# Patient Record
Sex: MALE | Race: WHITE
[De-identification: names, ages, dates, MRNs, and addresses within clinical notes are randomized per-mention and may not be internally consistent; named-entity substitution may affect disease eponyms.]

---

## 2018-04-13 NOTE — ER
Nurse's Notes                                                                                     

 [facility]                                                                

Name: Epifanio Quinones                                                                            

Age: 62 yrs                                                                                       

Sex: Male                                                                                         

: 1955                                                                                   

MRN: [id number]                                                                                   

Arrival Date: 2018                                                                          

Time: 21:39                                                                                       

Account#: [id number]                                                                            

Bed 4                                                                                             

Private MD:                                                                                       

Diagnosis: Chronic obstructive pulmonary disease with (acute) exacerbation                        

                                                                                                  

Presentation:                                                                                     

                                                                                             

21:33 Presenting complaint: EMS states: they were toned out for report of pt with respiratory bb  

      distress pt's room sats were 84% on their arrival they put pt on C-pap and administered     

      solu-medrol 125 mg and neb txs A\T\A x 2. Transition of care: patient was not received      

      from another setting of care. Onset of symptoms was 2018. Care prior to           

      arrival: Medication(s) given: Albuterol Neb x 2, Atrovent Neb x 2, solu-medrol 125 mg       

      IV initiated. 20 GA, in the left wrist.                                                     

21:33 Method Of Arrival: EMS: [facility]                                                    bb  

21:33 Acuity: AMBER 2                                                                           bb  

                                                                                                  

Historical:                                                                                       

- Allergies:                                                                                      

21:43 No Known Allergies;                                                                     bb  

- Home Meds:                                                                                      

21:43 Combivent  mcg/actuation Inhl aero [Active];                                      bb  

- PMHx:                                                                                           

21:43 Hypertension; COPD;                                                                     bb  

                                                                                                  

- Immunization history:: Adult Immunizations unknown.                                             

- Social history:: Smoking status: Patient uses tobacco products, smokes one pack                 

  cigarettes per day.                                                                             

                                                                                                  

                                                                                                  

Screenin:25 Abuse screen: Denies threats or abuse. Denies injuries from another. Nutritional        lp1 

      screening: No deficits noted. Tuberculosis screening: No symptoms or risk factors           

      identified. Fall Risk Total Mcleod Fall Scale indicates High Risk Score (45 or more          

      points). Fall prevention measures have been instituted. Side Rails Up X 2 As available      

      patient and family educated on Fall Prevention Program and Strategies.                      

                                                                                                  

Assessment:                                                                                       

21:54 Reassessment: pt states he is supposed to take cholesterol and blood pressure           bb  

      medication but he does not take them.                                                       

22:00 General: Appears distressed, Behavior is calm, cooperative. Pain: Denies pain. Neuro:   lp1 

      Level of Consciousness is awake, alert, obeys commands, Oriented to person, place,          

      time, situation. Cardiovascular: Capillary refill is > 3 seconds is sluggish Patient's      

      skin is warm and dry. Rhythm is sinus tachycardia. Respiratory: Airway is patent            

      Trachea midline Respiratory effort is even, labored, Respiratory pattern is tachypnea       

      Breath sounds with wheezes bilaterally. GI: Abdomen is flat. : No signs and/or            

      symptoms were reported regarding the genitourinary system. EENT: No signs and/or            

      symptoms were reported regarding the EENT system. Derm: Skin is fragile, is thin, with      

      poor turgor Skin is dry, Skin is pale. Musculoskeletal: Circulation, motion, and            

      sensation intact.                                                                           

23:00 Reassessment: Patient and/or family updated on plan of care and expected duration. Pain lp1 

      level reassessed. Patient continuing with BiPAP; Patient states symptoms have improved.     

                                                                                                  

Vital Signs:                                                                                      

21:43  / 131; Pulse 127; Resp 30 S; Temp 97.6(A); Pulse Ox 97% on 35% BiPAP; Weight     bb  

      65.77 kg (R); Height 5 ft. 8 in. (172.72 cm) (R);                                           

22:30  / 119; Pulse 115; Resp 23; Pulse Ox 99% on 35% BiPAP;                            lp1 

23:00  / 100; Pulse 108; Resp 23; Pulse Ox 98% on 35% BiPAP;                            lp1 

23:20  / 105; Pulse 117; Resp 25; Pulse Ox 98% on 35% BiPAP;                            lp1 

23:40  / 92; Pulse 114; Resp 25; Pulse Ox 98% on 35% BiPAP;                             lp1 

21:43 Body Mass Index 22.05 (65.77 kg, 172.72 cm)                                             bb  

                                                                                                  

ED Course:                                                                                        

21:39 Patient arrived in ED.                                                                  [id number] 

21:40 Yusuf Mckenzie PA is PHCP.                                                               [email or web address] 

21:40 Obinna Long MD is Attending Physician.                                           [email or web address] 

21:43 Triage completed.                                                                       bb  

21:43 Arm band placed on Patient placed in an exam room, on a stretcher, on oxygen, on        bb  

      cardiac monitor, on pulse oximetry, RT at bedside for placement of Bipap. Settings:         

      12/6, 12, 35%.                                                                              

22:00 Patient has correct armband on for positive identification. Bed in low position. Call   lp1 

      light in reach. Cardiac monitor on. Pulse ox on. NIBP on.                                   

22:00 Inserted saline lock: 22 gauge in right antecubital area, using aseptic technique.      lp1 

22:00 Maintain EMS IV. Dressing intact. Site clean \T\ dry. Gauge \T\ site: 20g Left Wrist.       lp
1

22:08 Solange Holden, RN is Primary Nurse.                                                       lp1 

22:10 Inserted saline lock: 20 gauge in left forearm, using aseptic technique. Blood          bb  

      collected.                                                                                  

22:24 X-ray completed. Portable x-ray completed in exam room. Patient tolerated procedure     1 

      well.                                                                                       

22:25 XRAY Chest (1 view) In Process Unspecified.                                             EDMS

23:07 Moise Boyd MD is Hospitalizing Provider.                                          [email or web address] 

23:26 No provider procedures requiring assistance completed. Patient admitted, IV remains in  lp1 

      place. 20g IV to left forearm DC'd due to infiltration.                                     

                                                                                                  

Administered Medications:                                                                         

22:09 Drug: Albuterol 2.5 mg Route: Inhalation;                                               lp1 

22:30 Drug: Albuterol 2.5 mg Route: Inhalation;                                               lp1 

23:01 Drug: Albuterol 2.5 mg Route: Inhalation;                                               lp1 

23:15 Drug: NS 0.9% 1000 ml Route: IV; Rate: 1000 ml; Site: left wrist;                       lp1 

                                                                                             

00:16 Follow up: IV Status: Infusion continued upon admission                                 lp1 

                                                                                             

23:15 Drug: hydrALAZINE 10 mg Route: IV; Rate: calculated rate; Site: left wrist;             lp1 

                                                                                             

00:15 Follow up: Response: Blood pressure is lowered; IV Status: Completed infusion           lp1 

                                                                                                  

                                                                                                  

Outcome:                                                                                          

                                                                                             

23:07 Decision to Hospitalize by Provider.                                                    [email or web address] 

23:27 Condition: stable                                                                       lp1 

23:27 Instructed on the need for admit.                                                           

                                                                                             

00:22 Admitted to Tele accompanied by nurse, via stretcher, room 422, with oxygen, with       1 

      chart, Report called to  STEPHANIE Cesar                                                          

00:30 Patient left the ED.                                                                    1 

                                                                                                  

Signatures:                                                                                       

Dispatcher [employer]                           EDMS                                                 

Ruba Baxter                               [facility]                                                  

Argentina Wood RN RN bb Pena, Laura, STEPHANIE                         RN   1                                                  

Yusuf Mckenzie PA PA   jr8                                                  

                                                                                                  

Corrections: (The following items were deleted from the chart)                                    

00:50 00:49 Patient left the ED. 1                                                          1 

                                                                                                  

**************************************************************************************************

## 2018-04-13 NOTE — EDPHYS
Physician Documentation                                                                           

 [facility]                                                                

Name: Epifanio Quinones                                                                            

Age: 62 yrs                                                                                       

Sex: Male                                                                                         

: 1955                                                                                   

MRN: [id number]                                                                                   

Arrival Date: 2018                                                                          

Time: 21:39                                                                                       

Account#: [id number]                                                                            

Bed 4                                                                                             

Private MD:                                                                                       

ED Physician Obinna Long                                                                    

HPI:                                                                                              

                                                                                             

22:01 This 62 yrs old  Male presents to ER via EMS with complaints of shortness of   jr8 

      breath.                                                                                     

22:01 The patient has shortness of breath at rest. Onset: The symptoms/episode began/occurred jr8 

      gradually, 2 day(s) ago. Duration: The symptoms are continuous, and are steadily            

      getting worse. The patient's shortness of breath is aggravated by walking. Associated       

      signs and symptoms: The patient has no apparent associated signs or symptoms. Severity      

      of symptoms: At their worst the symptoms were moderate in the emergency department the      

      symptoms are unchanged. It is unknown whether or not the patient has had similar            

      symptoms in the past. The patient has not recently seen a physician. Patient stated         

      that he has had worsening of shortness of breath over the last few days. Stated that it     

      was so bad tonight he called EMS. EMS stated that he was 84% on RA upon arrival. ROBERTO        

      with steroids given. CPAP applied after no relief. Patient stated that he feels better      

      on CPAP .                                                                                   

                                                                                                  

Historical:                                                                                       

- Allergies:                                                                                      

21:43 No Known Allergies;                                                                     bb  

- Home Meds:                                                                                      

21:43 Combivent  mcg/actuation Inhl aero [Active];                                      bb  

- PMHx:                                                                                           

21:43 Hypertension; COPD;                                                                     bb  

                                                                                                  

- Immunization history:: Adult Immunizations unknown.                                             

- Social history:: Smoking status: Patient uses tobacco products, smokes one pack                 

  cigarettes per day.                                                                             

                                                                                                  

                                                                                                  

ROS:                                                                                              

22:01 Eyes: Negative for injury, pain, redness, and discharge, ENT: Negative for injury,      jr8 

      pain, and discharge, Neck: Negative for injury, pain, and swelling, Cardiovascular:         

      Negative for chest pain, palpitations, and edema, Abdomen/GI: Negative for abdominal        

      pain, nausea, vomiting, diarrhea, and constipation, Back: Negative for injury and pain,     

      MS/Extremity: Negative for injury and deformity, Skin: Negative for injury, rash, and       

      discoloration, Neuro: Negative for headache, weakness, numbness, tingling, and seizure.     

22:01 Respiratory: Positive for cough, dyspnea on exertion, shortness of breath, wheezing.        

                                                                                                  

Exam:                                                                                             

22:01 Eyes:  Pupils equal round and reactive to light, extra-ocular motions intact.  Lids and jr8 

      lashes normal.  Conjunctiva and sclera are non-icteric and not injected.  Cornea within     

      normal limits.  Periorbital areas with no swelling, redness, or edema. ENT:  Nares          

      patent. No nasal discharge, no septal abnormalities noted.  Tympanic membranes are          

      normal and external auditory canals are clear.  Oropharynx with no redness, swelling,       

      or masses, exudates, or evidence of obstruction, uvula midline.  Mucous membranes           

      moist. Neck:  Trachea midline, no thyromegaly or masses palpated, and no cervical           

      lymphadenopathy.  Supple, full range of motion without nuchal rigidity, or vertebral        

      point tenderness.  No Meningismus. Abdomen/GI:  Soft, non-tender, with normal bowel         

      sounds.  No distension or tympany.  No guarding or rebound.  No evidence of tenderness      

      throughout. Back:  No spinal tenderness.  No costovertebral tenderness.  Full range of      

      motion. Skin:  Warm, dry with normal turgor.  Normal color with no rashes, no lesions,      

      and no evidence of cellulitis. MS/ Extremity:  Pulses equal, no cyanosis.                   

      Neurovascular intact.  Full, normal range of motion. Neuro:  Awake and alert, GCS 15,       

      oriented to person, place, time, and situation.  Cranial nerves II-XII grossly intact.      

      Motor strength 5/5 in all extremities.  Sensory grossly intact.  Cerebellar exam            

      normal.  Normal gait.                                                                       

22:01 Cardiovascular: Rate: tachycardic, Rhythm: regular, Pulses: Pulses are 2+ in bilateral      

      radial, brachial, femoral, popliteal, posterior tibial and and dorsalis pedis               

      arteries.. Heart sounds: normal, normal S1and S2, no S3 or S4, no murmur, no rub, no        

      gallop, Edema: is not appreciated, JVD: is not appreciated.                                 

22:01 Respiratory: moderate respiratory distress is noted,  Respirations: labored breathing,      

      tachypnea, Breath sounds: rales, that are mild, are located in both bases, wheezing:        

      expiratory that is moderate, is heard diffusely.                                            

                                                                                                  

Vital Signs:                                                                                      

21:43  / 131; Pulse 127; Resp 30 S; Temp 97.6(A); Pulse Ox 97% on 35% BiPAP; Weight     bb  

      65.77 kg (R); Height 5 ft. 8 in. (172.72 cm) (R);                                           

22:30  / 119; Pulse 115; Resp 23; Pulse Ox 99% on 35% BiPAP;                            lp1 

23:00  / 100; Pulse 108; Resp 23; Pulse Ox 98% on 35% BiPAP;                            lp1 

23:20  / 105; Pulse 117; Resp 25; Pulse Ox 98% on 35% BiPAP;                            lp1 

23:40  / 92; Pulse 114; Resp 25; Pulse Ox 98% on 35% BiPAP;                             lp1 

21:43 Body Mass Index 22.05 (65.77 kg, 172.72 cm)                                             bb  

                                                                                                  

MDM:                                                                                              

21:40 Patient medically screened.                                                             jr8 

23:06 Data reviewed: vital signs, nurses notes, lab test result(s), EKG, radiologic studies,  jr8 

      plain films, and as a result, I will admit patient. Data interpreted: Pulse oximetry:       

      on room air is 84 %. Interpretation: hypoxia. Counseling: I had a detailed discussion       

      with the patient and/or guardian regarding: the historical points, exam findings, and       

      any diagnostic results supporting the discharge/admit diagnosis, lab results, radiology     

      results, the need for further work-up and treatment in the hospital. Response to            

      treatment: the patient's symptoms have mildly improved after treatment. Physician           

      consultation: Moise Boyd MD was called at 23:07, was contacted at 23:07, regarding      

      admission, to the telemetry unit. and will see patient in ED.                               

                                                                                                  

                                                                                             

21:40 Order name: Basic Metabolic Panel; Complete Time: 22:50                                                                                                                              

21:40 Order name: BNP; Complete Time: 22:50                                                                                                                                                

21:40 Order name: CBC with Diff; Complete Time: 22:50                                                                                                                                      

21:40 Order name: LFT's; Complete Time: 22:50                                                 [facility] 

                                                                                             

21:40 Order name: Magnesium; Complete Time: 22:50                                                                                                                                          

21:40 Order name: PT-INR; Complete Time: 22:50                                                                                                                                             

21:40 Order name: Ptt, Activated; Complete Time: 22:50                                        [facility] 

                                                                                             

21:40 Order name: Troponin (emerg Dept Use Only); Complete Time: 22:50                        [facility] 

                                                                                             

21:40 Order name: XRAY Chest (1 view)                                                                                                                                                      

21:40 Order name: Blood Culture Adult (2)                                                     [facility] 

                                                                                             

22:29 Order name: ABG; Complete Time: 22:55                                                   [facility] 

                                                                                             

22:35 Order name: BIPAP                                                                       [facility] 

                                                                                             

21:40 Order name: EKG; Complete Time: 21:41                                                   [facility] 

                                                                                             

21:40 Order name: Cardiac monitoring; Complete Time: 21:52                                    [facility] 

                                                                                             

21:40 Order name: EKG - Nurse/Tech; Complete Time: 21:52                                      [facility] 

                                                                                             

21:40 Order name: IV Saline Lock; Complete Time: 22:09                                        [facility] 

                                                                                             

21:40 Order name: Labs collected and sent; Complete Time: 23:16                               [facility] 

                                                                                             

21:40 Order name: O2 Per Protocol; Complete Time: 22:09                                       [facility] 

                                                                                             

21:40 Order name: O2 Sat Monitoring; Complete Time: 21:53                                     [facility] 

                                                                                                  

Administered Medications:                                                                         

22:09 Drug: Albuterol 2.5 mg Route: Inhalation;                                               1 

22:30 Drug: Albuterol 2.5 mg Route: Inhalation;                                               1 

23:01 Drug: Albuterol 2.5 mg Route: Inhalation;                                               [facility] 

23:15 Drug: NS 0.9% 1000 ml Route: IV; Rate: 1000 ml; Site: left wrist;                       [facility] 

                                                                                             

00:16 Follow up: IV Status: Infusion continued upon admission                                 [facility] 

                                                                                             

23:15 Drug: hydrALAZINE 10 mg Route: IV; Rate: calculated rate; Site: left wrist;             [facility] 

                                                                                             

00:15 Follow up: Response: Blood pressure is lowered; IV Status: Completed infusion           1 

                                                                                                  

                                                                                                  

Disposition:                                                                                      

05:41 Co-signature as Attending Physician, Obinna Long MD.                               ma2 

                                                                                                  

Disposition:                                                                                      

18 23:07 Hospitalization ordered by Moise Boyd for Inpatient Admission. Preliminary    

  diagnosis is Chronic obstructive pulmonary disease with (acute) exacerbation.                   

- Bed requested for Telemetry/MedSurg (Inpatient).                                                

- Status is Inpatient Admission.                                                              lp1 

- Condition is Stable.                                                                            

- Problem is new.                                                                                 

- Symptoms have improved.                                                                         

UTI on Admission? No                                                                              

                                                                                                  

                                                                                                  

                                                                                                  

Signatures:                                                                                       

Dispatcher [employer]                           EDMS                                                 

Argentina Wood RN RN bb Pena, Laura, RN                         RN   lp1                                                  

Yusuf Mckenzie PA PA   jr8                                                  

Min, Starla, RN                       RN   cg                                                   

Obinna Long MD MD   ma2                                                  

                                                                                                  

**************************************************************************************************

## 2018-04-13 NOTE — P.HP
Certification for Inpatient


Patient admitted to: Inpatient


With expected LOS: >2 Midnights


Practitioner: I am a practitioner with admitting privileges, knowledge of 

patient current condition, hospital course, and medical plan of care.


Services: Services provided to patient in accordance with Admission 

requirements found in Title 42 Section 412.3 of the Code of Federal Regulations





Patient History


Date of Service: 04/13/18


Reason for admission: COPD exacerbation


History of Present Illness: 





Mr Quinones is a 62 years old male with history of COPD, HTN, dyslipidemia, who 

start about 3 weeks ago with increasing cough, unable to bring up any 

secretions. He has been feeling worse in the last couple of days, more SOB and 

cough. He denied fever or chills. No chest pain either. He called 911 today 

since he had severe SOB. EMS found the patient on respiratory distress, O2 Sat 

84% on RA. The patient states that has not been treated with Advair for the 

last month due to refill problems. In ER the patient was placed on BiPAP, had 

several breathing treatments rounds, IV steroids, and slowly has been 

improving. At presentation he was afebrile, tachycardic, tachypneic, WBC 11.9K, 

CXR shows no acute infiltrate, awaiting final report by radiologist.


Allergies





No Known Drug Allergies Allergy (Unverified 07/09/15 14:11)


 Unknown








- Past Medical/Surgical History


-: COPD


-: tobacco abuse


-: HTN


-: dyslipidemia


Past Surgical History: Reviewed- Non-Contributory





- Family History


Family History: Reviewed- Non-Contributory





- Social History


Smoking Status: Heavy Tobacco smoker (>10 cigarettes/day)


Counseled patient to stop smoking for: less than 10 minutes


Smoking therapy provided: Yes


Patient receptive to therapy: Yes


CD- Drugs: No


Place of Residence: Home





Review of Systems


10-point ROS is otherwise unremarkable





Physical Examination





- Physical Exam


General: Alert, In no apparent distress


HEENT: Atraumatic, PERRLA, Mucous membr. moist/pink, EOMI, Sclerae nonicteric


Neck: Supple, 2+ carotid pulse no bruit, No LAD, Without JVD or thyroid 

abnormality


Respiratory: Diminished, Expiratory wheezes (bilateral)


Cardiovascular: Regular rate/rhythm, Normal S1 S2


Gastrointestinal: Normal bowel sounds, No tenderness


Musculoskeletal: No tenderness


Integumentary: No rashes


Neurological: Normal speech, Normal strength at 5/5 x4 extr, Normal tone, 

Normal affect


Lymphatics: No axilla or inguinal lymphadenopathy





- Studies


Laboratory Data (last 24 hrs)





04/13/18 22:15: PT 14.1 H, INR 1.19, APTT 29.4


04/13/18 22:15: WBC 11.9 H, Hgb 17.1, Hct 51.4 H, Plt Count 187


04/13/18 22:15: B-Natriuretic Peptide 35


04/13/18 22:15: Sodium 137, Potassium 4.1, BUN 18, Creatinine 1.07, Glucose 96, 

Magnesium 2.0, Total Bilirubin 0.9, AST 32, ALT 31, Alkaline Phosphatase 91








Assessment and Plan





- Problems (Diagnosis)


(1) COPD exacerbation


Current Visit: Yes   Status: Acute   





(2) HTN (hypertension)


Current Visit: Yes   Status: Acute   


Qualifiers: 


   Hypertension type: essential hypertension   Qualified Code(s): I10 - 

Essential (primary) hypertension   





(3) Tobacco abuse


Current Visit: Yes   Status: Acute   





- Plan





The patient will be admitted to the hospital due to COPD exacerbation due to 

acute bronchitis. CXR shows no acute infiltrate, WBC elevated. Will give 

empiric antibiotic, IV steroids and scheduled breathing treatment. Consult Dr Massey.





- Advance Directives


Does patient have a Living Will: No


Does patient have a Durable POA for Healthcare: No





- Code Status/Comfort Care


Code Status Assessed: Yes


Code Status: Full Code

## 2018-04-14 NOTE — PN
Date of Progress Note:  04/14/2018



Subjective:  The patient seen and examined, chart reviewed, and case discussed with RN.  The patient 
states, he is very much short of breath.  States, he ran out of his Advair.



Review of Systems:

Negative except as above.



Medications:  Reviewed.



Physical Examination:

Vital Signs:  Temperature 99.5, heart rate 117, respirations 20, blood pressure 130/86, and O2 95% on
 3 L via nasal cannula. 

General:  Awake, alert, oriented x3, in some mild respiratory distress, appears older than stated age
.  Ill-appearing male. 

CV:  S1, S2.  Sinus tachycardia.  No murmurs.  Peripheral pulses weak bilaterally. 

Respiratory:  diminished breath sounds.  Wheezing is present throughout.  The patient is tachypneic. 


Gastrointestinal:  Abdomen is soft, nontender, nondistended.  Positive bowel sounds.  No guarding or 
rigidity. 

Extremities:  No clubbing, cyanosis, edema. 

Neurologic:  Nonfocal.



Laboratory Data:  Sodium 138, potassium 4.8, chloride 105, CO2 25, BUN 20, creatinine 1, glucose 124,
 and calcium 9.4.  WBC 5, H and H 16.8, 49.6, platelets 177, and neutrophils 85%.  Blood culture pend
ing.



Assessment:  A 62-year-old male with;

1.Acute chronic obstructive pulmonary disease exacerbation.  We will continue with breathing treatme
nts and IV steroids.  Chest x-ray did not show any acute infiltrate.  We will continue to monitor.  W
e will get respiratory therapy to evaluate for possible home oxygen need.

2.Essential hypertension.  We will resume home medications as appropriate.

3.Nicotine dependence with cigarette smoking.  Counseled.

4.Dyslipidemia.  Resume home medications as appropriate.



Plan:  Followup with pulmonology evaluation.  Continue prophylactic antibiotics.





SA/MODL

DD:  04/14/2018 12:35:33Voice ID:  [id number]

DT:  04/14/2018 13:57:42Report ID:  [id number]

## 2018-04-14 NOTE — RAD REPORT
EXAM DESCRIPTION:  RAD - Chest Single View - 4/13/2018 10:26 pm

 

CLINICAL HISTORY:  Dyspnea

 

COMPARISON:  None.

 

TECHNIQUE:  AP portable chest image was obtained 2215 hours .

 

FINDINGS:  Lungs are hyperexpanded with flattened diaphragm and costophrenic angle blunting. Fibrotic
 lung pattern is present. This COPD pattern has no comparison to assess for progression. Small granul
omas are noted. No consolidation or mass. Minimal interstitial edema or infiltrate could be masked in
 this setting. No significant infiltrative process currently seen. Failure and volume overload are no
t suspected. Heart and vasculature are normal. No measurable pleural effusion and no pneumothorax. No
 gross bony abnormality seen. No acute aortic findings suspected.

 

IMPRESSION:  Prominent COPD pattern without focal infiltrate, mass or consolidation.

 

Chronic interstitial lung disease can mask early stages of edema or infiltrate.

## 2018-04-15 NOTE — EKG
Test Date:    2018-04-13               Test Time:    21:47:16

Technician:   ESTHER                                     

                                                     

MEASUREMENT RESULTS:                                       

Intervals:                                           

Rate:         116                                    

SC:           122                                    

QRSD:         76                                     

QT:           320                                    

QTc:          444                                    

Axis:                                                

P:            86                                     

SC:           122                                    

QRS:          79                                     

T:            73                                     

                                                     

INTERPRETIVE STATEMENTS:                                       

                                                     

Sinus tachycardia

Biatrial enlargement

Abnormal ECG

No previous ECG available for comparison



Electronically Signed On 04-15-18 22:38:25 CDT by Gabriel Jang

## 2018-04-15 NOTE — P.DS
Admission Date: 04/13/18


Discharge Date: 04/15/18


Disposition: ROUTINE DISCHARGE


Discharge Condition: FAIR


Reason for Admission: COPD exacerbation


Brief History of Present Illness: 





Patient is 62 years of age admitted with COPD exacerbation


Hospital Course: 





Patient did well low no complaints he was hypoxic hypercapnic apparently he ran 

out of his inhaler the time of discharge he was doing well alert oriented 

responsive cooperative in no significant distress room-air sats were fine he 

did have mild hypoxemia on exertion patient is to resume his Advair prescribed 

him some prednisone chest x-ray shows hyperinflation he continues to smoke I 

counseled him for 3 min not to smoke and nicotine replacement therapy


The time of discharge vital signs all stable chest examination renal shows 

bilateral rhonchi diminished air entry cardiovascular smiles was normal abdomen 

soft extremities no edema he was ambulating prior to discharge


Vital Signs/Physical Exam: 














Temp Pulse Resp BP Pulse Ox


 


 98.4 F   96 H  18   152/86 H  93 


 


 04/15/18 08:00  04/15/18 08:00  04/15/18 08:00  04/15/18 08:00  04/15/18 08:00








Laboratory Data at Discharge: 














WBC  16.1 K/uL (4.3-10.9)  H D 04/15/18  04:17    


 


Hgb  15.2 g/dL (13.6-17.9)   04/15/18  04:17    


 


Hct  46.3 % (39.6-49.0)   04/15/18  04:17    


 


Plt Count  187 K/uL (152-406)   04/15/18  04:17    


 


PT  14.1 SECONDS (9.5-12.5)  H  04/13/18  22:15    


 


INR  1.19   04/13/18  22:15    


 


APTT  29.4 SECONDS (24.3-36.9)   04/13/18  22:15    


 


Sodium  137 mEq/L (135-145)   04/15/18  04:17    


 


Potassium  4.7 mEq/L (3.6-5.0)   04/15/18  04:17    


 


BUN  20 mg/dL (6-20)   04/15/18  04:17    


 


Creatinine  0.96 mg/dL (0.61-1.24)   04/15/18  04:17    


 


Glucose  116 mg/dL ()   04/15/18  04:17    


 


Magnesium  2.0 mg/dL (1.8-2.5)   04/13/18  22:15    


 


Total Bilirubin  0.9 mg/dL (0.3-1.2)   04/13/18  22:15    


 


AST  32 IU/L (10-42)   04/13/18  22:15    


 


ALT  31 IU/L (10-60)   04/13/18  22:15    


 


Alkaline Phosphatase  91 IU/L ()   04/13/18  22:15    


 


B-Natriuretic Peptide  35 pg/ml (<=100)   04/13/18  22:15    








Home Medications: 








Ipratropium/Albuterol Sulfate [Combivent Respimat Inhal Spray] 1 puff IH BID 04/ 14/18 


Fluticasone/Salmeterol [Advair 250-50 Diskus] 1 each IH BID #1 blst.w.dev 04/15/

18 


Prednisone [Deltasone] 20 mg PO BID #14 tablet 04/15/18 





New Medications: 


Fluticasone/Salmeterol [Advair 250-50 Diskus] 1 each IH BID #1 blst.w.dev


Prednisone [Deltasone] 20 mg PO BID #14 tablet


Patient Discharge Instructions: Patient to follow with me in 2 weeks discharge 

room-air sat at rest greater than 88%


Diet: Regular


Activity: Ad gallito

## 2022-08-07 ENCOUNTER — HOSPITAL ENCOUNTER (EMERGENCY)
Dept: HOSPITAL 97 - ER | Age: 67
Discharge: HOME | End: 2022-08-07
Payer: MEDICARE

## 2022-08-07 VITALS — OXYGEN SATURATION: 99 %

## 2022-08-07 VITALS — SYSTOLIC BLOOD PRESSURE: 182 MMHG | DIASTOLIC BLOOD PRESSURE: 84 MMHG

## 2022-08-07 VITALS — TEMPERATURE: 97.9 F

## 2022-08-07 DIAGNOSIS — Z20.822: ICD-10-CM

## 2022-08-07 DIAGNOSIS — J44.1: Primary | ICD-10-CM

## 2022-08-07 DIAGNOSIS — F17.210: ICD-10-CM

## 2022-08-07 DIAGNOSIS — I10: ICD-10-CM

## 2022-08-07 LAB
ALBUMIN SERPL BCP-MCNC: 4 G/DL (ref 3.4–5)
ALP SERPL-CCNC: 103 U/L (ref 45–117)
ALT SERPL W P-5'-P-CCNC: 32 U/L (ref 12–78)
AST SERPL W P-5'-P-CCNC: 24 U/L (ref 15–37)
BUN BLD-MCNC: 9 MG/DL (ref 7–18)
GLUCOSE SERPLBLD-MCNC: 98 MG/DL (ref 74–106)
HCT VFR BLD CALC: 45.6 % (ref 39.6–49)
LYMPHOCYTES # SPEC AUTO: 1.8 K/UL (ref 0.7–4.9)
MCV RBC: 88 FL (ref 80–100)
PMV BLD: 7.6 FL (ref 7.6–11.3)
POTASSIUM SERPL-SCNC: 3.9 MMOL/L (ref 3.5–5.1)
RBC # BLD: 5.18 M/UL (ref 4.33–5.43)
TROPONIN I SERPL HS-MCNC: 5.9 PG/ML (ref ?–58.9)

## 2022-08-07 PROCEDURE — 05HQ33Z INSERTION OF INFUSION DEVICE INTO LEFT EXTERNAL JUGULAR VEIN, PERCUTANEOUS APPROACH: ICD-10-PCS

## 2022-08-07 PROCEDURE — 36415 COLL VENOUS BLD VENIPUNCTURE: CPT

## 2022-08-07 PROCEDURE — 71045 X-RAY EXAM CHEST 1 VIEW: CPT

## 2022-08-07 PROCEDURE — 36569 INSJ PICC 5 YR+ W/O IMAGING: CPT

## 2022-08-07 PROCEDURE — 99285 EMERGENCY DEPT VISIT HI MDM: CPT

## 2022-08-07 PROCEDURE — 96374 THER/PROPH/DIAG INJ IV PUSH: CPT

## 2022-08-07 PROCEDURE — 84484 ASSAY OF TROPONIN QUANT: CPT

## 2022-08-07 PROCEDURE — 85025 COMPLETE CBC W/AUTO DIFF WBC: CPT

## 2022-08-07 PROCEDURE — 80053 COMPREHEN METABOLIC PANEL: CPT

## 2022-08-07 PROCEDURE — 94640 AIRWAY INHALATION TREATMENT: CPT

## 2022-08-07 PROCEDURE — 96372 THER/PROPH/DIAG INJ SC/IM: CPT

## 2022-08-07 NOTE — ER
Nurse's Notes                                                                                     

 [facility]                                                                 

Name: Epifanio Quinones                                                                            

Age: 67 yrs                                                                                       

Sex: Male                                                                                         

: 1955                                                                                   

MRN: [id number]                                                                                   

Arrival Date: 2022                                                                          

Time: 15:49                                                                                       

Account#: [id number]                                                                            

Bed 3                                                                                             

Private MD:                                                                                       

Diagnosis: COPD/ Chronic obstructive pulmonary disease with (acute) exacerbation                  

                                                                                                  

Presentation:                                                                                     

                                                                                             

16:06 Chief complaint: Patient states: hx of COPD and emphysema, has had SOB for two weeks,   iw  

      electricity went out and was unable to do his breathing treatments, I have eczema real      

      bad also. Coronavirus screen: Client presents with at least one sign or symptom that        

      may indicate coronavirus-19. Ebola Screen: Patient negative for fever greater than or       

      equal to 101.5 degrees Fahrenheit, and additional compatible Ebola Virus Disease            

      symptoms Patient denies exposure to infectious person. Patient denies travel to an          

      Ebola-affected area in the 21 days before illness onset. No symptoms or risks               

      identified at this time. Initial Sepsis Screen: Does the patient meet any 2 criteria?       

      No. Patient's initial sepsis screen is negative. Does the patient have a suspected          

      source of infection? No. Patient's initial sepsis screen is negative. Risk Assessment:      

      Do you want to hurt yourself or someone else? Patient reports no desire to harm self or     

      others. Onset of symptoms was 2022.                                                

16:06 Method Of Arrival: Wheelchair                                                           iw  

16:06 Acuity: AMBER 3                                                                           iw  

                                                                                                  

Triage Assessment:                                                                                

17:24 General: Appears in no apparent distress. comfortable, unkempt, Behavior is calm,       bm7 

      cooperative. Pain: Denies pain. EENT: No deficits noted. No signs and/or symptoms were      

      reported regarding the EENT system. Neuro: No deficits noted. Cardiovascular: No            

      deficits noted. Respiratory: Reports shortness of breath at rest on exertion cough that     

      is non-productive, Breath sounds are coarse bilaterally. Onset: The symptoms/episode        

      began/occurred yesterday, the patient has moderate shortness of breath. GI: No deficits     

      noted. No signs and/or symptoms were reported involving the gastrointestinal system.        

      : No deficits noted. No signs and/or symptoms were reported regarding the                 

      genitourinary system. Derm: Skin is intact, is fragile, is thin, has lesions on pt has      

      multiple sores covering entire body Skin is normal, Skin temperature is warm.               

      Musculoskeletal: No deficits noted. No signs and/or symptoms reported regarding the         

      musculoskeletal system.                                                                     

                                                                                                  

Historical:                                                                                       

- Allergies:                                                                                      

16:09 No Known Allergies;                                                                     iw  

- Home Meds:                                                                                      

16:09 Combivent  mcg/actuation Inhl aero [Active];                                      iw  

- PMHx:                                                                                           

16:09 COPD; Hypertension;                                                                     iw  

                                                                                                  

- Immunization history:: Adult Immunizations up to date.                                          

- Social history:: Smoking status: Patient reports the use of cigarette tobacco                   

  products, smokes one-half pack cigarettes per day.                                              

                                                                                                  

                                                                                                  

Screenin:12 Abuse screen: Denies threats or abuse. Nutritional screening: No deficits noted.        bm7 

      Tuberculosis screening: No symptoms or risk factors identified. Fall Risk None              

      identified.                                                                                 

                                                                                                  

Assessment:                                                                                       

17:27 Reassessment: Patient and/or family updated on plan of care and expected duration. Pain bm7 

      level reassessed. Patient is alert, oriented x 3, equal unlabored respirations, skin        

      warm/dry/pink.                                                                              

18:05 Reassessment: Patient and/or family updated on plan of care and expected duration. Pain bm7 

      level reassessed. Patient is alert, oriented x 3, equal unlabored respirations, skin        

      warm/dry/pink. Patient states feeling better.                                               

18:27 Cardiovascular: Rhythm is regular.                                                      bm7 

18:28 Respiratory: Airway is patent Trachea midline Respiratory effort is even, labored.      bm7 

                                                                                                  

Vital Signs:                                                                                      

16:06 Pulse 91; Resp 24 S; Temp 97.9; Pulse Ox 95% on R/A;                                    iw  

17:24  / 80; Pulse 74; Resp 20; Pulse Ox 99% on 2 lpm NC; Pain 0/10;                    bm7 

18:27  / 84; Pulse 78; Resp 16; Pulse Ox 99% on 2 lpm NC; Pain 0/10;                    bm7 

                                                                                                  

ED Course:                                                                                        

15:49 Patient arrived in ED.                                                                  am2 

16:08 Triage completed.                                                                       iw  

16:09 Arm band placed on.                                                                     iw  

16:25 Shad Hillman is PHCP.                                                                  jl9 

16:25 Santiago Amaro MD is Attending Physician.                                             jl9 

17:09 Michaela Guallpa, RN is Primary Nurse.                                                bm7 

17:12 Patient has correct armband on for positive identification. Placed in gown.             bm7 

17:15 XRAY Chest (1 view) In Process Unspecified.                                             EDMS

17:27 Client placed on continuous cardiac and pulse oximetry monitoring. NIBP monitoring      bm7 

      applied. Cardiac monitor on. Warm blanket given.                                            

17:27 Initial lab(s) drawn, by me, sent to lab. COVID swab sent to lab. X-ray(s) taken.       bm7 

      Inserted saline lock: 18 gauge in left antecubital area, using aseptic technique. Blood     

      collected. Missed attempt(s): 22 gauge in left upper arm. Bleeding controlled, band aid     

      applied, catheter tip intact. Oxygen administration via nasal cannula \T\ 2L/min Response     

      to oxygen therapy: symptoms improved.                                                       

18:27 No provider procedures requiring assistance completed. intact, bleeding controlled, No  bm7 

      redness/swelling at site. Pressure dressing applied.                                        

                                                                                                  

Administered Medications:                                                                         

17:09 Drug: DuoNeb (albuterol 2.5 mg, ipratropium 0.5 mg) (3:1) (2.5 mg - 0.5 mg) 6 ml Route: bm7 

      Nebulizer;                                                                                  

17:40 Follow up: Response: No adverse reaction                                                bm7 

17:29 Drug: SOLU-Medrol (methylPREDNISolone sodium succinate) 125 mg Route: IM; Site: Other;  bm7 

17:41 Follow up: Response: No adverse reaction                                                bm7 

17:50 Not Given (medication not available ): Pulmicort 0.25 mg/2 mL 0.25 mg Inhalation once   bm7 

18:08 Drug: Benadryl (diphenhydrAMINE) 50 mg Route: IVP; Site: Other;                         bm7 

18:28 Follow up: Response: No adverse reaction                                                bm7 

                                                                                                  

                                                                                                  

Medication:                                                                                       

17:12 VIS not applicable for this client.                                                     bm7 

                                                                                                  

Outcome:                                                                                          

18:17 Discharge ordered by MD. pretty 

18:27 Discharged to home ambulatory, with friend.                                             bm7 

18:27 Condition: improved                                                                         

18:27 Discharge instructions given to patient, Instructed on discharge instructions, follow       

      up and referral plans. medication usage, Demonstrated understanding of instructions,        

      follow-up care, medications, Prescriptions given X 1.                                       

18:29 Patient left the ED.                                                                    7 

                                                                                                  

Signatures:                                                                                       

Dispatcher [employer]                           EDYuko Yeh, RN                     Celi Isidro Brittany, RN RN bm7 Linares, John jl9                                                  

                                                                                                  

**************************************************************************************************

## 2022-08-07 NOTE — EDPHYS
Physician Documentation                                                                           

 [facility]                                                                 

Name: Epifanio Quinones                                                                            

Age: 67 yrs                                                                                       

Sex: Male                                                                                         

: 1955                                                                                   

MRN: [id number]                                                                                   

Arrival Date: 2022                                                                          

Time: 15:49                                                                                       

Account#: [id number]                                                                            

Bed 3                                                                                             

Private MD:                                                                                       

[facility] Physician Santiago Amaro                                                                      

HPI:                                                                                              

                                                                                             

16:35 This 67 yrs old  Male presents to ER via Wheelchair with complaints of         jl9 

      Shortness Of Breath.                                                                        

16:35 This 67 yrs old Male presents to ER via Wheelchair with complaints of Shortness Of      jl9 

      Breath. Patient reports being unable to take his nebs as prescribed due to a power          

      outage. .                                                                                   

16:35 The patient has shortness of breath during heavy activity. Onset: The symptoms/episode  jl9 

      began/occurred 2 week(s) ago. Duration: The symptoms are chronic. The patient's             

      shortness of breath is aggravated by exertion. Associated signs and symptoms: Pertinent     

      negatives: chest pain, dizziness, fever. The patient has experienced similar episodes       

      in the past.                                                                                

                                                                                                  

Historical:                                                                                       

- Allergies:                                                                                      

16:09 No Known Allergies;                                                                     iw  

- Home Meds:                                                                                      

16:09 Combivent  mcg/actuation Inhl aero [Active];                                      iw  

- PMHx:                                                                                           

16:09 COPD; Hypertension;                                                                     iw  

                                                                                                  

- Immunization history:: Adult Immunizations up to date.                                          

- Social history:: Smoking status: Patient reports the use of cigarette tobacco                   

  products, smokes one-half pack cigarettes per day.                                              

                                                                                                  

                                                                                                  

ROS:                                                                                              

16:36 Constitutional: Negative for fever, chills, and weight loss.                            jl9 

16:36 Eyes: Negative for injury, pain, redness, and discharge, ENT: Negative for injury,          

      pain, and discharge, Neck: Negative for injury, pain, and swelling, Cardiovascular:         

      Negative for chest pain, palpitations, and edema.                                           

16:36 Abdomen/GI: Negative for abdominal pain, nausea, vomiting, diarrhea, and constipation,      

      Back: Negative for injury and pain, : Negative for injury, bleeding, discharge, and       

      swelling, MS/Extremity: Negative for injury and deformity, Skin: Negative for injury,       

      rash, and discoloration, Neuro: Negative for headache, weakness, numbness, tingling,        

      and seizure, Psych: Negative for depression, anxiety, suicide ideation, homicidal           

      ideation, and hallucinations, Allergy/Immunology: Negative for hives, rash, and             

      allergies, Endocrine: Negative for neck swelling, polydipsia, polyuria, polyphagia, and     

      marked weight changes, Hematologic/Lymphatic: Negative for swollen nodes, abnormal          

      bleeding, and unusual bruising.                                                             

16:36 Constitutional: Positive for                                                                

16:36 Respiratory: Positive for shortness of breath, wheezing, expiratory.                        

                                                                                                  

Exam:                                                                                             

16:37 Constitutional:  This is a well developed, well nourished patient who is awake, alert,  jl9 

      and in no acute distress. Head/Face:  Normocephalic, atraumatic. Eyes:  Pupils equal        

      round and reactive to light, extra-ocular motions intact.  Lids and lashes normal.          

      Conjunctiva and sclera are non-icteric and not injected.  Cornea within normal limits.      

      Periorbital areas with no swelling, redness, or edema. ENT:   Mucous membranes moist.       

      Neck:  Trachea midline, no thyromegaly or masses palpated, and no cervical                  

      lymphadenopathy.  Supple, full range of motion without nuchal rigidity, or vertebral        

      point tenderness.  No Meningismus. Chest/axilla:  Normal chest wall appearance and          

      motion.  Nontender with no deformity.  No lesions are appreciated. Cardiovascular:          

      Regular rate and rhythm with a normal S1 and S2.  No gallops, murmurs, or rubs.  Normal     

      PMI, no JVD.  No pulse deficits.                                                            

16:37 Abdomen/GI:  Soft, non-tender, with normal bowel sounds.  No distension or tympany.  No     

      guarding or rebound.  No evidence of tenderness throughout. Back:  No spinal                

      tenderness.  No costovertebral tenderness.  Full range of motion. MS/ Extremity:            

      Pulses equal, no cyanosis.  Neurovascular intact.  Full, normal range of motion. Neuro:     

       Awake and alert, GCS 15, oriented to person, place, time, and situation.  Cranial          

      nerves II-XII grossly intact.  Motor strength 5/5 in all extremities.  Sensory grossly      

      intact.  Cerebellar exam normal.  Normal gait. Psych:  Awake, alert, with orientation       

      to person, place and time.  Behavior, mood, and affect are within normal limits.            

16:37 Respiratory: Breath sounds: wheezing: expiratory is heard diffusely.                        

16:37 Skin: Appearance: Bed bug bites. .                                                          

                                                                                                  

Vital Signs:                                                                                      

16:06 Pulse 91; Resp 24 S; Temp 97.9; Pulse Ox 95% on R/A;                                    iw  

17:24  / 80; Pulse 74; Resp 20; Pulse Ox 99% on 2 lpm NC; Pain 0/10;                    bm7 

18:27  / 84; Pulse 78; Resp 16; Pulse Ox 99% on 2 lpm NC; Pain 0/10;                    bm7 

                                                                                                  

Procedures:                                                                                       

17:30 Peripheral line: by aseptic technique a peripheral line was placed in the left external renae 

      jugular vein.                                                                               

                                                                                                  

MDM:                                                                                              

16:25 Patient medically screened.                                                             jl9 

16:38 Data reviewed: vital signs, nurses notes.                                               9 

18:12 Counseling: I had a detailed discussion with the patient and/or guardian regarding: the 9 

      historical points, exam findings, and any diagnostic results supporting the                 

      discharge/admit diagnosis, the presence of at least one elevated blood pressure reading     

      (>120/80) during this emergency department visit, the need for outpatient follow up, to     

      return to the emergency department if symptoms worsen or persist or if there are any        

      questions or concerns that arise at home, Patient reports that he has not been taking       

      his BP meds because he does not like how they make him feel. Patient educated on the        

      importance of BP management. . Response to treatment: the patient's symptoms have           

      markedly improved after treatment.                                                          

                                                                                                  

                                                                                             

16:31 Order name: SARS-COV-2 RT PCR (Document "Date of Onset" if Symptomatic); Complete Time:  

      18:03                                                                                       

                                                                                             

16:32 Order name: CMP; Complete Time: 18:03                                                                                                                                                

16:31 Order name: XRAY Chest (1 view); Complete Time: 18:03                                                                                                                                

16:32 Order name: CBC with Diff; Complete Time: 18:03                                         [facility] 

                                                                                             

16:32 Order name: Troponin High Sensitivity; Complete Time: 18:03                             [facility] 

                                                                                             

16:32 Order name: IV Saline Lock; Complete Time: 17:37                                        [facility] 

                                                                                                  

Administered Medications:                                                                         

17:09 Drug: DuoNeb (albuterol 2.5 mg, ipratropium 0.5 mg) (3:1) (2.5 mg - 0.5 mg) 6 ml Route: bm7 

      Nebulizer;                                                                                  

17:40 Follow up: Response: No adverse reaction                                                bm7 

17:29 Drug: SOLU-Medrol (methylPREDNISolone sodium succinate) 125 mg Route: IM; Site: Other;  bm7 

17:41 Follow up: Response: No adverse reaction                                                bm7 

17:50 Not Given (medication not available ): Pulmicort 0.25 mg/2 mL 0.25 mg Inhalation once   7 

18:08 Drug: Benadryl (diphenhydrAMINE) 50 mg Route: IVP; Site: Other;                         [facility] 

18:28 Follow up: Response: No adverse reaction                                                bm7 

                                                                                                  

                                                                                                  

Disposition Summary:                                                                              

22 18:17                                                                                    

Discharge Ordered                                                                                 

      Location: Home                                                                          jl9 

      Condition: Stable                                                                       jl9 

      Diagnosis                                                                                   

        - COPD/ Chronic obstructive pulmonary disease with (acute) exacerbation               jl9 

      Followup:                                                                               jl9 

        - With: Private Physician                                                                  

        - When: 1 - 2 days                                                                         

        - Reason: Recheck today's complaints, Continuance of care, Re-evaluation by your           

      physician                                                                                   

      Discharge Instructions:                                                                     

        - Discharge Summary Sheet                                                             jl9 

        - Chronic Obstructive Pulmonary Disease Exacerbation                                  jl9 

        - Hypertension, Adult, Easy-to-Read                                                   jl9 

      Forms:                                                                                      

        - Medication Reconciliation Form                                                      jl9 

        - Thank You Letter                                                                    jl9 

        - Antibiotic Education                                                                jl9 

        - Prescription Opioid Use                                                             jl9 

      Prescriptions:                                                                              

        - amlodipine 5 mg Oral tablet                                                              

            - take 1 tablet by ORAL route once daily; 30 tablet; Refills: 0, Product          jl9 

      Selection Permitted                                                                         

Signatures:                                                                                       

Dispatcher [employer]                           EDSantiago Kline MD MD cha Williams, Irene, RN                     Michaela Snyder RN RN bm7 Linares, John                                jl9                                                  

                                                                                                  

**************************************************************************************************

## 2022-08-07 NOTE — RAD REPORT
EXAM DESCRIPTION:  Mary Single View8/7/2022 5:14 pm

 

CLINICAL HISTORY:  Shortness of breath

 

COMPARISON:  2018

 

FINDINGS:  Lungs are markedly hyperaerated.

 

Calcified granulomas left lung.

 

 The lungs appear clear of acute infiltrate. The heart is normal size

 

IMPRESSION:  COPD without visualization of an acute abnormality

## 2022-08-07 NOTE — XMS REPORT
Continuity of Care Document

                            Created on:2022



Patient:GISELLE MELLO NMN

Sex:Male

:1955

External Reference #:[id number]





Demographics







                          Address                   [address] [place] [address]

 

                          Home Phone                [phone]

 

                          Email Address             NONE

 

                          Preferred Language        Unknown

 

                          Marital Status            Unknown

 

                          Yarsani Affiliation     Unknown

 

                          Race                      Unknown

 

                          Additional Race(s)        Unavailable

 

                          Ethnic Group              Unknown









Author







                          Organization              [facility]

t

 

                          Address                   [address]



                                                    [place] [address]

 

                          Phone                     [phone]









Care Team Providers







                    Name                Role                Phone

 

                    Emanuel Powell MD Attending Clinician [phone]

 

                    Doctor Unassigned, No Name Attending Clinician Unavailable

 

                    Emanuel Powell MD Admitting Clinician [phone]









Payers







           Payer Name Policy Type Policy Number Effective Date Expiration Date S

ource







Problems

This patient has no known problems.



Allergies, Adverse Reactions, Alerts

This patient has no known allergies or adverse reactions.



Social History







           Social Habit Start Date Stop Date  Quantity   Comments   Source

 

           Alcohol intake                                             [facility]

 

           Sex Assigned At Birth                                             Uni

[facility]









                Smoking Status  Start Date      Stop Date       Source

 

                Current some day smoker 2019 00:00:00                 [facility]







Medications







       Ordered Filled Start  Stop   Current Ordering Indication Dosage Frequency

 Signature

                    Comments            Components          Source



     Medication Medication Date Date Medication? Clinician                (SIG) 

          



     Name Name                                                   

 

     budesonide/      2019-0      Yes                      Inhale.           Uni

vers



     formoterol      8-14                                              ity of



     fumarate      20:20:                                              [place]



     (SYMBICORT      01                                                Medical



     INHALE)                                                        [place]

 

     ipratropium      2019-0      Yes                      Inhale.           Uni

vers



     /albuterol      8-14                                              ity of



     sulfate      20:20:                                              [place]



     (COMBIVENT      01                                                Medical



     INHALE)                                                        [place]

 

     fluticasone      2019-0      Yes                      Inhale.           Uni

vers



     propion/sal      8-14                                              ity of



     meterol      20:20:                                              [place]



     (ADVAIR                                                      Medical



     DISKUS                                                        [place]



     INHALE)                                                        

 

     ceFAZolin      2019-0      Yes                      PRN,           [facility]



     (ANCEF)      8-14                               Starting           ity of



     injection      19:30:                               Wed            [place]



               00                                 8/14/19 at           Medical



                                                  1430,           Branch



                                                  Until           



                                                  Discontinu           



                                                  ed, ASAP,           



                                                  Intra-op           

 

     carbachol      2019-0      Yes                      PRN,           [facility]



     (MIOSTAT)      8-14                               Starting           ity of



     0.01 %      19:29:                               Wed            [place]



     intraocular      19 at           Me

dical



     injection                                         1429,           Branch



                                                  Until           



                                                  Discontinu           



                                                  ed,            



                                                  Routine,           



                                                  Intra-op           

 

     balanced      2019-0      Yes                      PRN,           [facility]



     salt irrig                                     Starting           ity o

f



     soln comb1      19:16:                               Wed            [place]



     (BSS PLUS)      19 at           [facility]

ical



     ophthalmic                                         1416,           Branch



     solution                                         Until           



     500 mL bag                                         Discontinu           



                                                  ed,            



                                                  Routine,           



                                                  Intra-op           

 

     bupivacaine      2019-0      Yes                      PRN,           [facility]

s



     (preserv                                     Starting           ity of



     free)      19:07:                               Wed            [place]



     (SENSORCAIN      00                                 19 at           Me

dical



     E MPF) 0.75                                         1407,           Branch



     % (7.5                                         Until           



     mg/mL)                                         Discontinu           



     injection                                         ed,            



                                                  Routine,           



                                                  Intra-op           

 

     lactated      -0 2019- No             500mL      at [address] [place] [address]

s



     ringers IV                                mL/hr, 500           it

y of



     infusion      16:15: 16:32                          mL, IV           [place]



     500 mL      00   :00                           Infusion,           Medical



                                                  ONCE, 1           Branch



                                                  dose, 19 at           



                                                  1115,           



                                                  Routine,           



                                                  [facility] Pre-op           

 

     budesonide/      2019-0      Yes                      Inhale.           Uni

vers



     formoterol      8-13                                              ity of



     fumarate      13:41:                                              [place]



     (SYMBICORT      21                                                Medical



     INHALE)                                                        Branch

 

     ipratropium      2019-0      Yes                      Inhale.           Uni

vers



     /albuterol      8-13                                              ity of



     sulfate      13:41:                                              [place]



     (COMBIVENT      21                                                Medical



     INHALE)                                                        Branch

 

     fluticasone      2019-0      Yes                      Inhale.           Uni

vers



     propion/sal      8-13                                              ity of



     meterol      13:41:                                              [place]



     (ADVAIR      21                                                Medical



     DISKUS                                                        Branch



     INHALE)                                                        







Vital Signs







             Vital Name   Observation Time Observation Value Comments     Source

 

             Systolic blood 2019 19:42:00 156 mm[Hg]                [facility]

sity of



             pressure                                            [facility]

 

             Diastolic blood 2019 19:42:00 96 mm[Hg]                 [facility]

[facility]



             pressure                                            [facility]

 

             Body temperature 2019 19:42:00 36.78 Agnieszka                 [facility]

[facility]

 

             Respiratory rate 2019 19:42:00 18 /min                   [facility]

 

             Oxygen saturation in 2019 19:42:00 100 /min                  

[facility]



             Arterial blood by                                        [place] [facility]

vipin



             Pulse oximetry                                        Branch

 

             Heart rate   2019 16:23:00 73 /min                   [facility]

 

             Body height  2019 18:12:00 172.7 cm                  [facility]

 

             Body weight  2019 18:12:00 65.772 kg                 [facility]

 

             BMI          2019 18:12:00 22.05 kg/m2               [facility]







Procedures







                Procedure       Date / Time Performed Performing Clinician Sourc

e

 

                ASSIGNMENT OF BENEFITS 2019 14:02:27 Doctor Unassigned, No

 [facility]



                                                Name            [facility]







Encounters







        Start   End     Encounter Admission Attending Care    Care    Encounter 

Source



        Date/Time Date/Time Type    Type    Clinicians Facility Department ID   

   

 

        2019 [facility]    [id number]

[id number] [facility]



        11:11:00 15:19:00 Encounter         Emanuel Patton [id number]      

   ity of



                                                [place] [id number]         [place]

s



                                                Surgical [id number]         [facility]  [address]             Branch

 

        2019 Orders          Doctor  AXEL    [id number]

[address] [facility]



        00:00:00 00:00:00 Only            Unassigned, TIERRA   [id number]       

  ity of



                                        [facility] [id number]         Abdiel

as



                                                        [id number]         Medi

vipin



                                                        [address]             Branch







Results

This patient has no known results.

## 2025-02-09 ENCOUNTER — HOSPITAL ENCOUNTER (INPATIENT)
Dept: HOSPITAL 97 - ER | Age: 70
LOS: 2 days | Discharge: HOME | DRG: 190 | End: 2025-02-11
Attending: STUDENT IN AN ORGANIZED HEALTH CARE EDUCATION/TRAINING PROGRAM | Admitting: FAMILY MEDICINE
Payer: COMMERCIAL

## 2025-02-09 VITALS — BODY MASS INDEX: 22 KG/M2

## 2025-02-09 DIAGNOSIS — J98.4: ICD-10-CM

## 2025-02-09 DIAGNOSIS — F17.210: ICD-10-CM

## 2025-02-09 DIAGNOSIS — I10: ICD-10-CM

## 2025-02-09 DIAGNOSIS — E78.5: ICD-10-CM

## 2025-02-09 DIAGNOSIS — Z79.51: ICD-10-CM

## 2025-02-09 DIAGNOSIS — Z11.52: ICD-10-CM

## 2025-02-09 DIAGNOSIS — Z82.49: ICD-10-CM

## 2025-02-09 DIAGNOSIS — J96.01: ICD-10-CM

## 2025-02-09 DIAGNOSIS — Z79.52: ICD-10-CM

## 2025-02-09 DIAGNOSIS — Z99.81: ICD-10-CM

## 2025-02-09 DIAGNOSIS — J02.9: ICD-10-CM

## 2025-02-09 DIAGNOSIS — J44.1: Primary | ICD-10-CM

## 2025-02-09 LAB
ALBUMIN SERPL BCP-MCNC: 3.3 G/DL (ref 3.4–5)
ALBUMIN/GLOB SERPL: 0.6 {RATIO} (ref 1.1–1.8)
ALP SERPL-CCNC: 129 U/L (ref 45–117)
ALT SERPL W P-5'-P-CCNC: 35 U/L (ref 16–61)
ANION GAP SERPL CALC-SCNC: 9.1 MEQ/L (ref 5–15)
APTT BLD: 29.8 SECONDS (ref 24.3–36.9)
AST SERPL W P-5'-P-CCNC: 20 U/L (ref 15–37)
BUN BLD-MCNC: 13 MG/DL (ref 7–18)
GLOBULIN SER CALC-MCNC: 5.5 G/DL (ref 2.3–3.5)
GLUCOSE SERPLBLD-MCNC: 112 MG/DL (ref 74–106)
HCT VFR BLD CALC: 48.8 % (ref 39.6–49)
HGB BLD-MCNC: 16.1 G/DL (ref 13.6–17.9)
INR BLD: 1.36
LYMPHOCYTES # SPEC AUTO: 1.2 K/UL (ref 0.7–4.9)
MCH RBC QN AUTO: 30.5 PG (ref 27–35)
MCHC RBC AUTO-ENTMCNC: 33 G/DL (ref 32–36)
MCV RBC: 92.5 FL (ref 80–100)
NRBC # BLD: 0 10*3/UL (ref 0–0)
NRBC BLD AUTO-RTO: 0.1 % (ref 0–0)
PMV BLD: 8.3 FL (ref 7.6–11.3)
POTASSIUM SERPL-SCNC: 4.1 MEQ/L (ref 3.5–5.1)
PROTHROMBIN TIME: 14.2 SECONDS (ref 9.4–12.5)
RBC # BLD: 5.27 M/UL (ref 4.33–5.43)
SARS-COV+SARS-COV-2 AG RESP QL IA.RAPID: (no result)
WBC # BLD AUTO: 11.1 THOU/UL (ref 4.3–10.9)

## 2025-02-09 PROCEDURE — 96374 THER/PROPH/DIAG INJ IV PUSH: CPT

## 2025-02-09 PROCEDURE — 36415 COLL VENOUS BLD VENIPUNCTURE: CPT

## 2025-02-09 PROCEDURE — 85730 THROMBOPLASTIN TIME PARTIAL: CPT

## 2025-02-09 PROCEDURE — 94760 N-INVAS EAR/PLS OXIMETRY 1: CPT

## 2025-02-09 PROCEDURE — 93005 ELECTROCARDIOGRAM TRACING: CPT

## 2025-02-09 PROCEDURE — 80053 COMPREHEN METABOLIC PANEL: CPT

## 2025-02-09 PROCEDURE — 94640 AIRWAY INHALATION TREATMENT: CPT

## 2025-02-09 PROCEDURE — 83605 ASSAY OF LACTIC ACID: CPT

## 2025-02-09 PROCEDURE — 80048 BASIC METABOLIC PNL TOTAL CA: CPT

## 2025-02-09 PROCEDURE — 99285 EMERGENCY DEPT VISIT HI MDM: CPT

## 2025-02-09 PROCEDURE — 71045 X-RAY EXAM CHEST 1 VIEW: CPT

## 2025-02-09 PROCEDURE — 85610 PROTHROMBIN TIME: CPT

## 2025-02-09 PROCEDURE — 84100 ASSAY OF PHOSPHORUS: CPT

## 2025-02-09 PROCEDURE — 87040 BLOOD CULTURE FOR BACTERIA: CPT

## 2025-02-09 PROCEDURE — 87804 INFLUENZA ASSAY W/OPTIC: CPT

## 2025-02-09 PROCEDURE — 81001 URINALYSIS AUTO W/SCOPE: CPT

## 2025-02-09 PROCEDURE — 96375 TX/PRO/DX INJ NEW DRUG ADDON: CPT

## 2025-02-09 PROCEDURE — 85025 COMPLETE CBC W/AUTO DIFF WBC: CPT

## 2025-02-09 PROCEDURE — 83735 ASSAY OF MAGNESIUM: CPT

## 2025-02-09 PROCEDURE — 87811 SARS-COV-2 COVID19 W/OPTIC: CPT

## 2025-02-09 NOTE — P.HP
Certification for Inpatient


Patient admitted to: Inpatient


With expected LOS: >2 Midnights


Practitioner: I am a practitioner with admitting privileges, knowledge of 

patient current condition, hospital course, and medical plan of care.


Services: Services provided to patient in accordance with Admission requirements

found in Title 42 Section 412.3 of the Code of Federal Regulations





Patient History


Date of Service: 02/09/25


Reason for admission: COPD exacerbation 


History of Present Illness: 








69 yrs old Male with past medical history of COPD, hypertension, hyperlipidemia 

who was brought to ER with shortness of breath which has been going on for the 

last 2 weeks and has been progressively getting worse.  Associated with cough 

with mucoid expectoration with some yellowish tinge.  Denies any hemoptysis. No 

nausea vomiting or diarrhea.  Denies any sick contacts.  Has subjective fever.  

Shortness of breath worsens with even minimal exertions.  





Patient was assessed in the ER and is admitted for further management of COPD 

exacerbation











Allergies





No Known Drug Allergies Allergy (Verified 04/14/18 01:40)


   Unknown


No Known Allergies Allergy (Uncoded 04/14/18 01:10)


   Unknown





Home medications list reviewed: Yes


Home Medications: 








Fluticasone/Salmeterol [Advair 250/50 Diskus*] 1 puff IH BID #1 disk 05/26/15 


predniSONE [Deltasone] 10 mg PO BID #20 tab 05/26/15 


levoFLOXacin [Levaquin] 500 mg PO DAILY #10 tab 05/27/15 


Ipratropium/Albuterol Sulfate [Combivent Respimat  Mcg] 1 puff IH BID 

04/14/18 


Fluticasone Propion/Salmeterol [Advair 250-50 Diskus] 1 each IH BID #1 

blst.w.dev 04/15/18 


Nicotine [Nicoderm] 21 mg TD DAILY #30 patch.td24 04/15/18 


predniSONE [Deltasone] 20 mg PO BID #14 tablet 04/15/18 








- Past Medical/Surgical History


Diabetic: No


Past Medical History: Reviewed- Non-Contributory


-: COPD


-: tobacco abuse


-: HTN


-: dyslipidemia


Past Surgical History: Reviewed- Non-Contributory





- Family History


  ** Mother


-: Heart disease


Notes: had pacemaker





- Social History


Smoking Status: Current some day smoker


Alcohol use: No


CD- Drugs: Yes


Caffeine use: Yes





Review of Systems


10-point ROS is otherwise unremarkable





Physical Examination





- Vital Signs


Temperature: 97.6 F


Blood Pressure: 160/110


Pulse: 110


Respirations: 20


Pulse Ox (%): 92





- Physical Exam


General: Alert, Oriented x3, Cooperative, Moderate distress


HEENT: Atraumatic, Normocephalic


Neck: Supple, No Thyromegaly


Respiratory: Diminished, Crackles/rales, Expiratory wheezes


Cardiovascular: No edema, Regular rate/rhythm, Normal S1 S2


Capillary refill: <2 Seconds


Gastrointestinal: Soft and benign, W/out hepatosplenomegaly


Musculoskeletal: No clubbing, No swelling


Integumentary: No rashes


Neurological: Normal speech, Normal strength at 5/5 x4 extr, Cranial nerves 3-12

 intact


Lymphatics: No axilla or inguinal lymphadenopathy





- Studies


Laboratory Data (last 24 hrs)











  02/09/25 02/09/25 02/09/25





  18:50 18:50 18:50


 


WBC    11.10 H


 


Hgb    16.1


 


Hct    48.8


 


Plt Count    204


 


PT  14.2 H  


 


INR  1.36  


 


APTT  29.8  


 


Sodium   135 L 


 


Potassium   4.1 


 


BUN   13 


 


Creatinine   1.12 


 


Glucose   112 H 


 


Total Bilirubin   0.9 


 


AST   20 


 


ALT   35 


 


Alkaline Phosphatase   129 H 








Microbiology Data (last 24 hrs): 








02/09/25 18:35   Nasopharnyx   Influenza Type A Antigen Screen - Final


02/09/25 18:35   Nasopharnyx   Influenza Type B Antigen Screen - Final








Assessment and Plan





- Plan








COPD exacerbation


Monitor closely on telemetry


Started on bronchodilators


Oxygen supplementation


Steroids added


Chest x-ray findings noted





Acute hypoxic respiratory failure


Oxygen supplementation


Will try to wean down oxygen requirement


Monitor closely





Pneumonitis


Started on empiric antibiotic


Will obtain chest x-ray in a.m.


COVID-negative


Influenza negative


Hypertension


Continue home medications and titrate as needed





GI/DVT prophylaxis


Advanced directive full code








Discharge Plan: Home


Plan to discharge in: 48 Hours





- Advance Directives


Does patient have a Living Will: No


Does patient have a Durable POA for Healthcare: No





- Code Status/Comfort Care


Code Status: Full Code


Time Spent Managing Pts Care (In Minutes): 48

## 2025-02-09 NOTE — RAD REPORT
EXAMINATION: ONE VIEW CHEST XR



CLINICAL INDICATION: 



TECHNIQUE: Frontal chest projection is submitted. Examination is limited by patient positioning and t
echnique.



COMPARISON: 8/7/2022



FINDINGS:



The lungs are diffusely emphysematous but grossly clear. The heart is normal in size. Numerous calcif
ied granulomata. No displaced fractures identified.



IMPRESSION: 



COPD without an acute process suspected. 



Evidence of prior granulomatous infection.



Reported By: Nicolas Bowser 

Electronically Signed:  2/9/2025 6:20 PM

## 2025-02-09 NOTE — EDPHYS
Physician Documentation                                                                           

 [facility]                                                                 

Name: Epifanio Quinones                                                                            

Age: 69 yrs                                                                                       

Sex: Male                                                                                         

: 1955                                                                                   

MRN: [id number]                                                                                   

Arrival Date: 2025                                                                          

Time: 17:21                                                                                       

Account#: [id number]                                                                            

Bed 4                                                                                             

Private MD:                                                                                       

ED Physician Jerardo Woods                                                                         

HPI:                                                                                              

                                                                                             

17:48 This 69 yrs old Male presents to ER via EMS with complaints of Shortness Of Breath.     rn  

17:48 The patient has shortness of breath at rest, with light activity. Onset: The            rn  

      symptoms/episode began/occurred 2 week(s) ago. Duration: The symptoms are intermittent.     

      The patient's shortness of breath is aggravated by coughing, exertion, light activity.      

      Associated signs and symptoms: Pertinent positives: productive cough, fever, Pertinent      

      negatives: hemoptysis. Severity of symptoms: At their worst the symptoms were moderate      

      in the emergency department the symptoms are unchanged. The patient has experienced         

      similar episodes in the past.                                                               

                                                                                                  

Historical:                                                                                       

- Allergies:                                                                                      

17:33 No Known Allergies;                                                                     iw  

- Home Meds:                                                                                      

17:33 Combivent  mcg/actuation Inhl aero [Active];                                      iw  

- PMHx:                                                                                           

17:33 COPD; Hypertension;                                                                     iw  

                                                                                                  

- Immunization history:: Adult Immunizations.                                                     

- Infectious Disease History:: Denies.                                                            

- Family history:: not pertinent.                                                                 

- Hospitalizations: : No recent hospitalization is reported.                                      

- Social history:: Smoking status: Patient reports the use of cigarette tobacco                   

  products.                                                                                       

                                                                                                  

                                                                                                  

ROS:                                                                                              

17:48 Constitutional: Positive for subjective fever and chills Cardiovascular: Negative for   rn  

      chest pain, palpitations, and edema, Respiratory: Positive for shortness of breath and      

      productive cough Abdomen/GI: Negative for abdominal pain, nausea, vomiting, diarrhea,       

      and constipation, MS/Extremity: Negative for injury and deformity, Skin: Negative for       

      injury, rash, and discoloration, Neuro: Negative for headache, weakness, numbness,          

      tingling, and seizure,                                                                      

                                                                                                  

Exam:                                                                                             

17:48 Constitutional:  Thin male, tachypneic Head/Face:  Normocephalic, atraumatic.           rn  

      Cardiovascular:  Tachycardic, regular Respiratory:  Moderate tachypnea with diffuse         

      wheezing Abdomen/GI:  Soft, nontender                                                       

20:04 ECG was reviewed by the Attending Physician.                                            renae 

                                                                                                  

Vital Signs:                                                                                      

17:30  / 110; Pulse 113; Resp 28 S; Temp 97.6(TE); Pulse Ox 95% on 4 lpm NC;            iw  

                                                                                                  

MDM:                                                                                              

17:28 Medical Screening Exam initiated                                                        rn  

17:49 ED course: EMS gave DuoNeb prior to arrival. Patient was 84% saturation on room air,    rn  

      does not have oxygen prescribed, has been using his roommates oxygen..                      

18:50 Differential diagnosis: Chronic Obstructive Pulmonary Disease pneumonia, Pneumothorax   rn  

      pulmonary edema, reactive airway disease. Data reviewed: vital signs, nurses notes, lab     

      test result(s), radiologic studies, plain films, and as a result, I will admit patient.     

      Consideration of Admission/Observation Patient was admitted/placed on observation.          

      Counseling: I had a detailed discussion with the patient and/or guardian regarding the      

      historical points, exam findings, and any diagnostic results supporting the                 

      discharge/admit diagnosis, lab results, radiology results, the need for further work-up     

      and treatment in the hospital. Response to treatment: the patient's symptoms have           

      mildly improved after treatment, and as a result, I will admit patient.                     

                                                                                                  

                                                                                             

17:31 Order name: Blood Culture Adult (2)                                                     rn  

                                                                                             

17:31 Order name: CBC with Diff                                                               rn  

                                                                                             

17:31 Order name: CMP                                                                         rn  

                                                                                             

17:31 Order name: Lactate w/ 2H reflex if indic.                                              rn  

                                                                                             

17:31 Order name: Protime (+inr)                                                              rn  

                                                                                             

17:31 Order name: Ptt, Activated                                                              rn  

                                                                                             

17:31 Order name: SARS-COV-2 Antigen Rapid                                                    rn  

                                                                                             

17:31 Order name: Flu                                                                         rn  

                                                                                             

19:44 Order name: Urinalysis w/ reflexes                                                      EDMS

                                                                                             

19:44 Order name: CBC with Automated Diff                                                     EDMS

                                                                                             

19:44 Order name: CBC with Automated Diff                                                     EDMS

                                                                                             

19:44 Order name: Comprehensive Metabolic Panel                                               EDMS

                                                                                             

19:44 Order name: Comprehensive Metabolic Panel                                               EDMS

                                                                                             

17:31 Order name: Chest Single View XRAY; Complete Time: 18:29                                rn  

                                                                                             

17:31 Order name: EKG; Complete Time: 17:31                                                   rn  

                                                                                             

17:31 Order name: Accucheck; Complete Time: 18:37                                             rn  

                                                                                             

17:31 Order name: Cardiac monitoring; Complete Time: 18:31                                    rn  

                                                                                             

17:31 Order name: EKG - Nurse/Tech; Complete Time: 18:37                                      rn  

                                                                                             

17:31 Order name: IV Saline Lock - Large Bore; Complete Time: 18:56                           rn  

                                                                                             

17:31 Order name: Labs collected and sent; Complete Time: 19:08                               rn  

                                                                                             

17:31 Order name: O2 Per Protocol; Complete Time: 18:37                                       rn  

                                                                                             

17:31 Order name: O2 Sat Monitoring; Complete Time: 18:37                                     rn  

                                                                                             

17:31 Order name: Vital Signs; Complete Time: 18:37                                           rn  

                                                                                                  

EC:04 Rate is 129 beats/min. Rhythm is regular. QRS Axis is Normal. OR interval is normal.    renae 

      QRS interval is normal. QT interval is normal. No Q waves. T waves are Normal. No ST        

      changes noted. Clinical impression: Sinus tachycardia and No evidence of ischemia.          

      Interpreted by me. Reviewed by me.                                                          

                                                                                                  

Administered Medications:                                                                         

18:27 Drug: Levalbuterol Inhalation 1.25 mg Inhalation once Route: Inhalation;                iw  

18:27 Drug: Levalbuterol Inhalation 1.25 mg Inhalation once Route: Inhalation;                iw  

18:27 Drug: Ipratropium Inhalation Aerosol 0.5 mg Inhalation once Route: Inhalation;          iw  

19:08 Drug: MethylPrednisoLONE  mg IVP once Route: IVP; Site: left forearm;            iw  

19:08 Follow up: Response: No adverse reaction                                                iw  

19:08 Drug: Zithromax IVPB 500 mg IVPB once over 1 hrs; mix in 250 mL NS Route: IVPB; Infused iw  

      Over: 1 hrs; Site: left forearm;                                                            

19:08 Drug: Rocephin IV 1 grams IV at calculated rate once; Given slow IV push per pharmacy   iw  

      instructions Route: IV; Rate: calculated rate; Site: left forearm;                          

19:08 Follow up: IV Status: Completed infusion                                                iw  

                                                                                                  

                                                                                                  

Disposition:                                                                                      

18:53 Critical Care:.                                                                         rn  

                                                                                                  

Disposition Summary:                                                                              

25 18:54                                                                                    

Hospitalization Ordered                                                                           

 Notes:       Hospitalization Status: Inpatient Admission                                           
  rn

      Provider: Declan Willingham                                                                rn  

      Location: [facility] (Inpatient)                                                 rn  

      Condition: Stable                                                                       rn  

      Problem: an acute exacerbation                                                          rn  

      Symptoms: have improved                                                                 rn  

      Bed/Room Type: Standard                                                                 rn  

      Room Assignment: 229(25 19:52)                                                    rv1 

      Diagnosis                                                                                   

        - COPD/ Chronic obstructive pulmonary disease with (acute) exacerbation               rn  

        - Hypoxemia                                                                           rn  

      Forms:                                                                                      

        - Medication Reconciliation Form                                                      rn  

        - SBAR form                                                                           rn  

        - Leadership Thank You Letter                                                         rn  

Critical care time excluding procedures:                                                          

18:53 Critical care time: Bedside Care: 35 minutes. Total time: 35 minutes                    rn  

                                                                                                  

Signatures:                                                                                       

Dispatcher [employer]                           Santiago Trejo MD MD cha Williams, Irene, RN RN iw Nieto, Roman, MD MD rn Villegas, Rebecca                            rv1                                                  

                                                                                                  

Corrections: (The following items were deleted from the chart)                                    

19:52 18:54 rn                                                                                rv1 

                                                                                                  

**************************************************************************************************

## 2025-02-09 NOTE — ER
Nurse's Notes                                                                                     

 [facility]                                                                 

Name: Epifanio Quinones                                                                            

Age: 69 yrs                                                                                       

Sex: Male                                                                                         

: 1955                                                                                   

MRN: [id number]                                                                                   

Arrival Date: 2025                                                                          

Time: 17:21                                                                                       

Account#: [id number]                                                                            

Bed 4                                                                                             

Private MD:                                                                                       

Diagnosis: COPD/ Chronic obstructive pulmonary disease with (acute) exacerbation;Hypoxemia        

                                                                                                  

Presentation:                                                                                     

                                                                                             

17:32 Chief complaint: EMS states: COPD exacerbation, audible wheezing, BP was 172/102, HR    iw  

      120 , 82 RA, up to 94 on NC , had breathing tx earlier today. Coronavirus screen:           

      Client presents with at least one sign or symptom that may indicate coronavirus-19.         

      [place] Screen: No symptoms or risks identified at this time. Risk Assessment: Do you         

      want to hurt yourself or someone else? Patient reports no desire to harm self or            

      others. Onset of symptoms was 2025.                                            

17:32 Method Of Arrival: EMS: [facility]                                                    iw  

17:32 Acuity: AMBER 3                                                                           iw  

17:32 Initial Sepsis Screen: Does the patient meet any 2 criteria? RR > 20 per min. HR > 90   iw  

      bpm. Does the patient have a suspected source of infection? No. Patient's initial           

      sepsis screen is negative.                                                                  

                                                                                                  

Historical:                                                                                       

- Allergies:                                                                                      

17:33 No Known Allergies;                                                                     iw  

- Home Meds:                                                                                      

17:33 Combivent  mcg/actuation Inhl aero [Active];                                      iw  

- PMHx:                                                                                           

17:33 COPD; Hypertension;                                                                     iw  

                                                                                                  

- Immunization history:: Adult Immunizations.                                                     

- Infectious Disease History:: Denies.                                                            

- Family history:: not pertinent.                                                                 

- Hospitalizations: : No recent hospitalization is reported.                                      

- Social history:: Smoking status: Patient reports the use of cigarette tobacco                   

  products.                                                                                       

                                                                                                  

                                                                                                  

Screenin:30 [facility] ED Fall Risk Assessment (Adult) History of falling in the last 3 months,       iw  

      including since admission No falls in past 3 months (0 pts) Confusion or Disorientation     

      No (0 pts) Intoxicated or Sedated No (0 pts) Impaired Gait No (0 pts) Mobility Assist       

      Device Used No (0 pt) Altered Elimination No (0 pt) Score/Fall Risk Level 0 - 2 = Low       

      Risk Oriented to surroundings. Abuse screen: Denies threats or abuse. Nutritional           

      screening: No deficits noted. Tuberculosis screening: No symptoms or risk factors           

      identified.                                                                                 

                                                                                                  

Assessment:                                                                                       

17:45 General: Appears uncomfortable, Behavior is calm, cooperative. Pain: Denies pain.       iw  

      Neuro: Level of Consciousness is awake, alert, obeys commands, Oriented to person,          

      place, time, situation, Moves all extremities. Full function. Cardiovascular: Patient's     

      skin is warm and dry. Rhythm is sinus tachycardia. Respiratory: Reports shortness of        

      breath at rest on exertion cough that is productive, labored breathing Airway is patent     

      Respiratory effort is even, labored, Respiratory pattern is regular, symmetrical,           

      tachypnea. GI: Abdomen is flat, non-distended. Derm: Skin is intact, is fragile, Skin       

      is normal. Musculoskeletal: Range of motion: intact in all extremities.                     

18:29 Reassessment: unable to obtain IV access at this time, Charge Nurse at bedside for US     

      IV.                                                                                         

19:00 Reassessment: pt refused 2nd set of blood cultures, has been stuck 4 times , ER MD      iw  

      notified.                                                                                   

                                                                                                  

Vital Signs:                                                                                      

17:30  / 110; Pulse 113; Resp 28 S; Temp 97.6(TE); Pulse Ox 95% on 4 lpm NC;            iw  

                                                                                                  

ED Course:                                                                                        

17:23 Patient arrived in ED.                                                                  eb  

17:28 Jerardo Woods MD is Attending Physician.                                                rn  

17:33 Triage completed.                                                                       iw  

17:34 Yuko De La Cruz, RN is Primary Nurse.                                                   iw  

17:34 Arm band placed on.                                                                     iw  

18:00 Missed attempt(s): 24 gauge in right hand. Bleeding controlled, band aid applied,       iw  

      catheter tip intact.                                                                        

18:00 Missed attempt(s): 22 gauge in right wrist. Bleeding controlled, band aid applied,      iw  

      catheter tip intact.                                                                        

18:05 Chest Single View XRAY In Process Unspecified.                                          EDMS

18:31 Patient has correct armband on for positive identification. Client placed on continuous iw  

      cardiac and pulse oximetry monitoring. NIBP monitoring applied. Cardiac monitor on.         

18:40 Missed attempt(s): 20 gauge in left antecubital area. VIA Ultrasound. Bleeding          ss  

      controlled, band aid applied, catheter tip intact.                                          

18:54 Declan Willingham MD is Hospitalizing Provider.                                           rn  

18:54 Accessed peripheral vein via ultrasound, utilizing dynamic ultrasound technique Blood   ss  

      collected. using 20G Nexia IV catheter ,sterile technique, per hospital protocol. Clean     

      \T\ dry. Dressing intact. Good blood return. Flushes easily. .                              

19:11 Provided Education on: lab wait time .                                                  iw  

19:59 No provider procedures requiring assistance completed. Patient admitted, IV remains in  cp4 

      place.                                                                                      

                                                                                                  

Administered Medications:                                                                         

18:27 Drug: Levalbuterol Inhalation 1.25 mg Inhalation once Route: Inhalation;                iw  

18: Drug: Levalbuterol Inhalation 1.25 mg Inhalation once Route: Inhalation;                iw  

18: Drug: Ipratropium Inhalation Aerosol 0.5 mg Inhalation once Route: Inhalation;          iw  

19:08 Drug: MethylPrednisoLONE  mg IVP once Route: IVP; Site: left forearm;            iw  

19:08 Follow up: Response: No adverse reaction                                                iw  

19: Drug: Zithromax IVPB 500 mg IVPB once over 1 hrs; mix in 250 mL NS Route: IVPB; Infused iw  

      Over: 1 hrs; Site: left forearm;                                                            

19:08 Drug: Rocephin IV 1 grams IV at calculated rate once; Given slow IV push per pharmacy   iw  

      instructions Route: IV; Rate: calculated rate; Site: left forearm;                          

19:08 Follow up: IV Status: Completed infusion                                                iw  

                                                                                                  

                                                                                                  

Medication:                                                                                       

21:52 VIS not applicable for this client.                                                     ha1 

                                                                                                  

Outcome:                                                                                          

18:54 Decision to Hospitalize by Provider.                                                    rn  

21:51 Admitted to Med/surg accompanied by tech, via stretcher, room 229, with chart,          [facility] 

21:51 Condition: stable                                                                           

21:51 Instructed on the need for admit, Demonstrated understanding of instructions,               

21:52 Patient left the ED.                                                                    ha1 

                                                                                                  

Signatures:                                                                                       

Dispatcher [employer]                           EDYuko Yeh RN RN                                                      

Jerardo Woods MD MD rn Blanchard, Shelby, RN RN                                                      

Mercedes Contreras Heidy, RN RN   [facility]                                                  

Pebbles Easley                            [facility]                                                  

                                                                                                  

Corrections: (The following items were deleted from the chart)                                    

18:29 17:30  / 110; Pulse 113bpm; Resp 28bpm; Spontaneous; Temp 97.6F Temporal;       iw  

                                                                                                  

**************************************************************************************************

## 2025-02-10 LAB
ALBUMIN SERPL BCP-MCNC: 2.7 G/DL (ref 3.4–5)
ALBUMIN/GLOB SERPL: 0.5 {RATIO} (ref 1.1–1.8)
ALP SERPL-CCNC: 107 U/L (ref 45–117)
ALT SERPL W P-5'-P-CCNC: 27 U/L (ref 16–61)
ANION GAP SERPL CALC-SCNC: 7.4 MEQ/L (ref 5–15)
AST SERPL W P-5'-P-CCNC: 15 U/L (ref 15–37)
BUN BLD-MCNC: 17 MG/DL (ref 7–18)
DOHLE BOD BLD QL SMEAR: PRESENT
GIANT PLATELETS BLD QL SMEAR: (no result)
GLOBULIN SER CALC-MCNC: 5 G/DL (ref 2.3–3.5)
GLUCOSE SERPLBLD-MCNC: 196 MG/DL (ref 74–106)
HCT VFR BLD CALC: 44.7 % (ref 39.6–49)
HGB BLD-MCNC: 15.5 G/DL (ref 13.6–17.9)
LYMPHOCYTES # SPEC AUTO: 0.5 K/UL (ref 0.7–4.9)
MAGNESIUM SERPL-MCNC: 2.5 MG/DL (ref 1.6–2.4)
MCH RBC QN AUTO: 31.6 PG (ref 27–35)
MCHC RBC AUTO-ENTMCNC: 34.5 G/DL (ref 32–36)
MCV RBC: 91.4 FL (ref 80–100)
MORPHOLOGY BLD-IMP: (no result)
NEUTROPHILS NFR FLD MANUAL: 85 % (ref 40–80)
NRBC # BLD: 0 10*3/UL (ref 0–0)
NRBC BLD AUTO-RTO: 0 % (ref 0–0)
PMV BLD: 9 FL (ref 7.6–11.3)
POTASSIUM SERPL-SCNC: 4.4 MEQ/L (ref 3.5–5.1)
RBC # BLD: 4.89 M/UL (ref 4.33–5.43)
TOTAL CELLS COUNTED SPEC: 100
UA COMPLETE W REFLEX CULTURE PNL UR: (no result)
UA DIPSTICK W REFLEX MICRO PNL UR: (no result)
WBC # BLD AUTO: 9 THOU/UL (ref 4.3–10.9)

## 2025-02-10 RX ADMIN — Medication PRN LOZ: at 13:55

## 2025-02-10 RX ADMIN — CEFTRIAXONE SCH MLS: 1 INJECTION, POWDER, FOR SOLUTION INTRAMUSCULAR; INTRAVENOUS at 09:06

## 2025-02-10 RX ADMIN — HYDRALAZINE HYDROCHLORIDE PRN MG: 20 INJECTION INTRAMUSCULAR; INTRAVENOUS at 00:44

## 2025-02-10 RX ADMIN — ENOXAPARIN SODIUM SCH MG: 40 INJECTION SUBCUTANEOUS at 09:07

## 2025-02-10 RX ADMIN — IPRATROPIUM BROMIDE ONE MG: 0.5 SOLUTION RESPIRATORY (INHALATION) at 22:28

## 2025-02-10 RX ADMIN — SODIUM CHLORIDE ONE MLS: 0.9 INJECTION, SOLUTION INTRAVENOUS at 10:31

## 2025-02-10 RX ADMIN — ALBUTEROL SULFATE SCH: 2.5 SOLUTION RESPIRATORY (INHALATION) at 01:00

## 2025-02-10 RX ADMIN — ALBUTEROL SULFATE ONE MG: 2.5 SOLUTION RESPIRATORY (INHALATION) at 22:28

## 2025-02-10 RX ADMIN — SODIUM CHLORIDE SCH MLS: 0.9 INJECTION, SOLUTION INTRAVENOUS at 09:07

## 2025-02-10 RX ADMIN — MOMETASONE FUROATE AND FORMOTEROL FUMARATE DIHYDRATE SCH: 200; 5 AEROSOL RESPIRATORY (INHALATION) at 07:00

## 2025-02-10 RX ADMIN — IPRATROPIUM BROMIDE SCH: 0.5 SOLUTION RESPIRATORY (INHALATION) at 01:00

## 2025-02-10 RX ADMIN — NICOTINE SCH: 21 PATCH TRANSDERMAL at 09:00

## 2025-02-10 NOTE — P.PN
Date of Service: 02/10/25





Subjective





awake eating breakfast, on 5 LNC


Reports seeing a providers at the [facility]


Reports he is using Oxygen from a friend and receives his inhalers from "the 

street"





Home O2 evaluation, 88% on room air














ROS


10 point ROS as noted above, otherwise negative








Physical Exam


General: Alert, Oriented x3, NAD


HEENT: Atraumatic, Normocephalic


Neck: Supple, No Thyromegaly


Respiratory:  Crackles/rales, Expiratory wheezes, on 5 LNC


Cardiovascular: No edema, mild tachycardia, Normal S1 S2


Capillary refill: <2 Seconds


Gastrointestinal: Soft and benign on palpation, active bowel sounds


Musculoskeletal: No clubbing, No swelling


Integumentary: No rashes


Neurological: Normal speech, Normal strength at 5/5 x4 extr, Cranial nerves 3-12

intact


Lymphatics: No axilla or inguinal lymphadenopathy








Vitals Reviewed











Problem list


Acute hypoxic respiratory failure 2/2 COPD exacerbation 


Pneumonitis


Sore throat








Assessment and Plan





Acute hypoxic respiratory failure 2/2 COPD exacerbation 


Monitor closely on telemetry


Continue bronchodilators


Oxygen supplementation


Steroids added


Chest x-ray findings noted


Will try to wean down oxygen requirement


Monitor closely


Home O2 evaluation, 88% on room air, home oxygen ordered











Pneumonitis


Started on empiric antibiotic


Will obtain chest x-ray in a.m.


COVID-negative


Influenza negative


Hypertension


Continue home medications and titrate as needed


Consult Dr. Massey








Sore throat


-Chloraseptic lozenge








DVT prophylaxis lovenox


Advanced directive full code








Discharge Plan: Home


Plan to discharge in: 48 Hours

## 2025-02-11 VITALS — TEMPERATURE: 97.9 F

## 2025-02-11 VITALS — DIASTOLIC BLOOD PRESSURE: 78 MMHG | SYSTOLIC BLOOD PRESSURE: 141 MMHG

## 2025-02-11 VITALS — OXYGEN SATURATION: 99 %

## 2025-02-11 LAB
ANION GAP SERPL CALC-SCNC: 6.3 MEQ/L (ref 5–15)
BUN BLD-MCNC: 26 MG/DL (ref 7–18)
GLUCOSE SERPLBLD-MCNC: 116 MG/DL (ref 74–106)
HCT VFR BLD CALC: 44.6 % (ref 39.6–49)
HGB BLD-MCNC: 15.4 G/DL (ref 13.6–17.9)
LYMPHOCYTES # SPEC AUTO: 0.8 K/UL (ref 0.7–4.9)
MAGNESIUM SERPL-MCNC: 2.4 MG/DL (ref 1.6–2.4)
MCH RBC QN AUTO: 31.6 PG (ref 27–35)
MCHC RBC AUTO-ENTMCNC: 34.6 G/DL (ref 32–36)
MCV RBC: 91.2 FL (ref 80–100)
NRBC # BLD: 0 10*3/UL (ref 0–0)
NRBC BLD AUTO-RTO: 0 % (ref 0–0)
PMV BLD: 9 FL (ref 7.6–11.3)
POTASSIUM SERPL-SCNC: 4.3 MEQ/L (ref 3.5–5.1)
RBC # BLD: 4.89 M/UL (ref 4.33–5.43)
WBC # BLD AUTO: 12.4 THOU/UL (ref 4.3–10.9)

## 2025-02-11 NOTE — P.DS
Admission Date: 02/09/25


Discharge Date: 02/11/25


Disposition: ROUTINE DISCHARGE


Discharge Condition: GOOD


Reason for Admission: COPD exacerbation 


Brief History of Present Illness: 








69 yrs old Male with past medical history of COPD, hypertension, hyperlipidemia 

who was brought to ER with shortness of breath which has been going on for the 

last 2 weeks and has been progressively getting worse.  Associated with cough 

with mucoid expectoration with some yellowish tinge.  Denies any hemoptysis. No 

nausea vomiting or diarrhea.  Denies any sick contacts.  Has subjective fever.  

Shortness of breath worsens with even minimal exertions.  





Patient was assessed in the ER and is admitted for further management of COPD 

exacerbation





Hospital Course: 








Problem list


Acute hypoxic respiratory failure 2/2 COPD exacerbation 


Pneumonitis


Sore throat








Patient was admitted to the hospital for COPD exacerbation.  He was started on 

steroids, empiric antibiotics with levofloxacin, as needed nebulizer treatments 

had gradual improvement in his symptoms.  He has a concentrator for home oxygen 

at home but did not have a portable tank, we attempted to arrange for portable 

tank/formal home oxygen set up but patient declined as they would not do this 

without setting up a concentrator which she already had.  He states at home that

he has inhalers including Combivent, Trelegy which he will be using.  He will be

sent a prescription for a short course of prednisone and levofloxacin.  It will 

be important he follows up with his primary care doctor as well as a 

pulmonologistDr. Massey.  He had a chest x-ray this morning that showed COPD 

with no visualized acute abnormality.  He has remained afebrile throughout 

hospitalization.





Continue your home medication as previously prescribed


Prescription for prednisone and levofloxacin the antibiotic sent to pharmacy [facility]

in [place] follow-up with your primary care doctor in Dr. Massey in the next 

1 to 2 weeks





Vital Signs/Physical Exam: 














Temp Pulse Resp BP Pulse Ox


 


 97.9 F   98 H  18   141/78 H  90 L


 


 02/11/25 08:00  02/11/25 08:16  02/11/25 08:00  02/11/25 08:16  02/11/25 08:00








General: Alert, In no apparent distress, Oriented x3


HEENT: Atraumatic, PERRLA


Neck: Supple, JVD not distended


Respiratory: Diminished, Expiratory wheezes (mild)


Cardiovascular: Regular rate/rhythm, Normal S1 S2


Gastrointestinal: Normal bowel sounds


Musculoskeletal: No tenderness


Integumentary: No rashes


Neurological: Normal speech, Normal tone, Normal affect


Lymphatics: No axilla or inguinal lymphadenopathy


Laboratory Data at Discharge: 














WBC  12.40 thou/uL (4.3-10.9)  H  02/11/25  05:05    


 


Hgb  15.4 g/dL (13.6-17.9)   02/11/25  05:05    


 


Hct  44.6 % (39.6-49.0)   02/11/25  05:05    


 


Plt Count  222 thou/uL (152-406)   02/11/25  05:05    


 


PT  14.2 SECONDS (9.4-12.5)  H  02/09/25  18:50    


 


INR  1.36   02/09/25  18:50    


 


APTT  29.8 SECONDS (24.3-36.9)   02/09/25  18:50    


 


Sodium  136 mEq/L (136-145)   02/11/25  05:05    


 


Potassium  4.3 mEq/L (3.5-5.1)   02/11/25  05:05    


 


BUN  26 mg/dL (7-18)  H  02/11/25  05:05    


 


Creatinine  1.03 mg/dL (0.70-1.30)   02/11/25  05:05    


 


Glucose  116 mg/dL ()  H  02/11/25  05:05    


 


Phosphorus  1.5 mg/dL (2.5-4.9)  L*  02/11/25  05:05    


 


Magnesium  2.4 mg/dL (1.6-2.4)   02/11/25  05:05    


 


Total Bilirubin  0.4 mg/dL (0.2-1.0)   02/10/25  05:08    


 


AST  15 U/L (15-37)   02/10/25  05:08    


 


ALT  27 U/L (16-61)   02/10/25  05:08    


 


Alkaline Phosphatase  107 U/L ()   02/10/25  05:08    








Home Medications: 








Fluticasone/Salmeterol [Advair 250/50 Diskus*] 1 puff IH BID #1 disk 05/26/15 


Ipratropium/Albuterol Sulfate [Combivent Respimat  Mcg] 1 puff IH BID 

04/14/18 


Fluticasone Propion/Salmeterol [Advair 250-50 Diskus] 1 each IH BID #1 

blst.w.dev 04/15/18 


Nicotine [Nicoderm*] 21 mg TD DAILY #30 patch.td24 04/15/18 


levoFLOXacin [Levaquin*] 750 mg PO DAILY #5 tab 02/11/25 


predniSONE [Deltasone*] 10 mg PO BID 7 Days #14 tab 02/11/25 





New Medications: 


predniSONE [Deltasone*] 10 mg PO BID 7 Days #14 tab


levoFLOXacin [Levaquin*] 750 mg PO DAILY #5 tab


Physician Discharge Instructions: 


Patient was admitted to the hospital for COPD exacerbation.  He was started on 

steroids, empiric antibiotics with levofloxacin, as needed nebulizer treatments 

had gradual improvement in his symptoms.  He has a concentrator for home oxygen 

at home but did not have a portable tank, we attempted to arrange for portable 

tank/formal home oxygen set up but patient declined as they would not do this 

without setting up a concentrator which she already had.  He states at home that

 he has inhalers including Combivent, Trelegy which he will be using.  He will 

be sent a prescription for a short course of prednisone and levofloxacin.  It 

will be important he follows up with his primary care doctor as well as a 

pulmonologistDr. Massey.  He had a chest x-ray this morning that showed COPD 

with no visualized acute abnormality.  He has remained afebrile throughout 

hospitalization.





Continue your home medication as previously prescribed


Prescription for prednisone and levofloxacin the antibiotic sent to pharmacy [facility]

 in [place] follow-up with your primary care doctor in Dr. Massey in the next

 1 to 2 weeks





Diet: Regular


Activity: Ad gallito


Followup: 


John Massey MD [ACTIVE - CAN ADMIT] - 1-2 Weeks


Adalid Colon DO [Primary Care Provider] - 1 Week


Time spent managing pt's care (in minutes): 41

## 2025-02-11 NOTE — RAD REPORT
Procedure: Chest Single View



HISTORY: Cough                    



COMPARISON: February 9, 2025



FINDINGS:



The lungs appear clear of acute infiltrate.. The lungs remain moderately to markedly hyperaerated.



No significant pleural effusion noted.



The heart is normal size.



IMPRESSION:



COPD without visualization acute abnormality



Reported By: Nahum Swain 

Electronically Signed:  2/11/2025 7:15 AM

## 2025-02-11 NOTE — P.CNS
Date of Consult: 02/11/25


Reason for Consult: COPD exacerbation


Chief Complaint: COPD exacerbation 


History of Present Illness: 





Patient is 69 years of age has been sick for about 3 days admitted with 

worsening dyspnea cough congestion history of COPD heavy smoker 1 pack a day 

denies any fever chills had a productive cough takes his friends [place] does 

not follow-up with any physicians


Allergies





No Known Drug Allergies Allergy (Verified 04/14/18 01:40)


   Unknown


No Known Allergies Allergy (Uncoded 04/14/18 01:10)


   Unknown





Home Medications: 








Fluticasone/Salmeterol [Advair 250/50 Diskus*] 1 puff IH BID #1 disk 05/26/15 


Ipratropium/Albuterol Sulfate [Combivent Respimat  Mcg] 1 puff IH BID 

04/14/18 


Fluticasone Propion/Salmeterol [Advair 250-50 Diskus] 1 each IH BID #1 

blst.w.dev 04/15/18 


Nicotine [Nicoderm*] 21 mg TD DAILY #30 patch.td24 04/15/18 


levoFLOXacin [Levaquin*] 750 mg PO DAILY #5 tab 02/11/25 


predniSONE [Deltasone*] 10 mg PO BID 7 Days #14 tab 02/11/25 








- Past Medical/Surgical History


Diabetic: No


-: COPD


-: tobacco abuse


-: HTN


-: dyslipidemia


-: TUBERCULOSIS IN 1980'S





- Family History


  ** Mother


Medical History: Heart disease


Notes: had pacemaker





- Social History


Smoking Status: Current every day smoker


Alcohol use: No


CD- Drugs: Yes


Caffeine use: Yes


Place of Residence: Home





Review of Systems


10-point ROS is otherwise unremarkable





Physical Examination














Temp Pulse Resp BP Pulse Ox


 


 97.9 F   98 H  18   141/78 H  90 L


 


 02/11/25 08:00  02/11/25 08:16  02/11/25 08:00  02/11/25 08:16  02/11/25 08:00








General: Alert, Oriented x3


Neck: Supple


Respiratory: Rhonchi/gurgles


Cardiovascular: No edema, Regular rate/rhythm, Normal S1 S2


Gastrointestinal: Normal bowel sounds, Soft and benign, Non-distended





- Problems


(1) COPD exacerbation


Status: Acute   


Plan: 


Patient is 69 years of age admitted with worsening dyspnea cough congestion 

chest x-ray hyperinflated consistent with COPD admitted with an exacerbation 

takes his friend's trilogy at home he is doing much better plan to discharge him

 on prednisone antibiotics continue with Trelegy to follow-up with me as an 

outpatient will need outpatient pulmonary function testing he will need low-dose

 CAT scan for lung cancer screening patient has been counseled not to smoke was 

reviewed unremarkable patient's phosphorus is a little low

## 2025-02-11 NOTE — EKG
Test Date:    2025-02-09               Test Time:    18:29:10

Technician:   FLAVIA                                     

                                                     

MEASUREMENT RESULTS:                                       

Intervals:                                           

Rate:         129                                    

WV:           130                                    

QRSD:         78                                     

QT:           294                                    

QTc:          430                                    

Axis:                                                

P:            86                                     

WV:           130                                    

QRS:          80                                     

T:            67                                     

                                                     

INTERPRETIVE STATEMENTS:                                       

                                                     

Sinus tachycardia

Biatrial enlargement

Abnormal ECG

Compared to ECG 04/13/2018 21:47:16

No significant changes



Electronically Signed On 02-11-25 12:42:28 CST by Luis Ulloa

## 2025-02-24 ENCOUNTER — HOSPITAL ENCOUNTER (INPATIENT)
Dept: HOSPITAL 97 - ER | Age: 70
LOS: 1 days | Discharge: ANOTHER HOSPITAL | DRG: 280 | End: 2025-02-25
Attending: FAMILY MEDICINE | Admitting: FAMILY MEDICINE
Payer: COMMERCIAL

## 2025-02-24 VITALS — BODY MASS INDEX: 22.4 KG/M2

## 2025-02-24 DIAGNOSIS — I10: ICD-10-CM

## 2025-02-24 DIAGNOSIS — J15.1: ICD-10-CM

## 2025-02-24 DIAGNOSIS — I25.10: ICD-10-CM

## 2025-02-24 DIAGNOSIS — D72.829: ICD-10-CM

## 2025-02-24 DIAGNOSIS — Z72.0: ICD-10-CM

## 2025-02-24 DIAGNOSIS — J96.21: ICD-10-CM

## 2025-02-24 DIAGNOSIS — R00.0: ICD-10-CM

## 2025-02-24 DIAGNOSIS — I21.4: Primary | ICD-10-CM

## 2025-02-24 DIAGNOSIS — E87.1: ICD-10-CM

## 2025-02-24 DIAGNOSIS — J44.1: ICD-10-CM

## 2025-02-24 DIAGNOSIS — E78.5: ICD-10-CM

## 2025-02-24 DIAGNOSIS — Z99.81: ICD-10-CM

## 2025-02-24 LAB
ANION GAP SERPL CALC-SCNC: 6.2 MEQ/L (ref 5–15)
APTT BLD: 26.5 SECONDS (ref 24.3–36.9)
BUN BLD-MCNC: 14 MG/DL (ref 7–18)
GLUCOSE SERPLBLD-MCNC: 96 MG/DL (ref 74–106)
HCT VFR BLD CALC: 49 % (ref 39.6–49)
HDLC SERPL-MCNC: 113 MG/DL (ref 40–60)
HGB BLD-MCNC: 16.2 G/DL (ref 13.6–17.9)
INR BLD: 1.06
LDLC SERPL CALC-MCNC: 134 MG/DL (ref ?–130)
LDLC SERPL-MCNC: 134 MG/DL
LYMPHOCYTES # SPEC AUTO: 1.3 K/UL (ref 0.7–4.9)
MCH RBC QN AUTO: 30.7 PG (ref 27–35)
MCHC RBC AUTO-ENTMCNC: 33.1 G/DL (ref 32–36)
MCV RBC: 92.7 FL (ref 80–100)
NRBC # BLD: 0 10*3/UL (ref 0–0)
NRBC BLD AUTO-RTO: 0.1 % (ref 0–0)
NT-PROBNP SERPL-MCNC: 676 PG/ML (ref ?–125)
PMV BLD: 7.9 FL (ref 7.6–11.3)
POTASSIUM SERPL-SCNC: 4.2 MEQ/L (ref 3.5–5.1)
PROTHROMBIN TIME: 12 SECONDS (ref 10–13)
RBC # BLD: 5.29 M/UL (ref 4.33–5.43)
TROPONIN I SERPL HS-MCNC: 375.4 PG/ML (ref ?–58.9)
TROPONIN I SERPL HS-MCNC: 565.3 PG/ML (ref ?–58.9)
TSH SERPL DL<=0.05 MIU/L-ACNC: 1.37 UIU/ML (ref 0.36–3.74)
UA DIPSTICK W REFLEX MICRO PNL UR: (no result)
WBC # BLD AUTO: 11.5 THOU/UL (ref 4.3–10.9)

## 2025-02-24 PROCEDURE — 84443 ASSAY THYROID STIM HORMONE: CPT

## 2025-02-24 PROCEDURE — 87186 SC STD MICRODIL/AGAR DIL: CPT

## 2025-02-24 PROCEDURE — 96375 TX/PRO/DX INJ NEW DRUG ADDON: CPT

## 2025-02-24 PROCEDURE — 99152 MOD SED SAME PHYS/QHP 5/>YRS: CPT

## 2025-02-24 PROCEDURE — 76937 US GUIDE VASCULAR ACCESS: CPT

## 2025-02-24 PROCEDURE — 94640 AIRWAY INHALATION TREATMENT: CPT

## 2025-02-24 PROCEDURE — 4A023N7 MEASUREMENT OF CARDIAC SAMPLING AND PRESSURE, LEFT HEART, PERCUTANEOUS APPROACH: ICD-10-PCS

## 2025-02-24 PROCEDURE — 87205 SMEAR GRAM STAIN: CPT

## 2025-02-24 PROCEDURE — 87070 CULTURE OTHR SPECIMN AEROBIC: CPT

## 2025-02-24 PROCEDURE — 80053 COMPREHEN METABOLIC PANEL: CPT

## 2025-02-24 PROCEDURE — 93458 L HRT ARTERY/VENTRICLE ANGIO: CPT

## 2025-02-24 PROCEDURE — 80048 BASIC METABOLIC PNL TOTAL CA: CPT

## 2025-02-24 PROCEDURE — 93005 ELECTROCARDIOGRAM TRACING: CPT

## 2025-02-24 PROCEDURE — 36415 COLL VENOUS BLD VENIPUNCTURE: CPT

## 2025-02-24 PROCEDURE — 81003 URINALYSIS AUTO W/O SCOPE: CPT

## 2025-02-24 PROCEDURE — 96374 THER/PROPH/DIAG INJ IV PUSH: CPT

## 2025-02-24 PROCEDURE — 99285 EMERGENCY DEPT VISIT HI MDM: CPT

## 2025-02-24 PROCEDURE — 85025 COMPLETE CBC W/AUTO DIFF WBC: CPT

## 2025-02-24 PROCEDURE — 87077 CULTURE AEROBIC IDENTIFY: CPT

## 2025-02-24 PROCEDURE — 71045 X-RAY EXAM CHEST 1 VIEW: CPT

## 2025-02-24 PROCEDURE — 94760 N-INVAS EAR/PLS OXIMETRY 1: CPT

## 2025-02-24 PROCEDURE — 80061 LIPID PANEL: CPT

## 2025-02-24 PROCEDURE — 87040 BLOOD CULTURE FOR BACTERIA: CPT

## 2025-02-24 PROCEDURE — 85610 PROTHROMBIN TIME: CPT

## 2025-02-24 PROCEDURE — 85730 THROMBOPLASTIN TIME PARTIAL: CPT

## 2025-02-24 PROCEDURE — B2111ZZ FLUOROSCOPY OF MULTIPLE CORONARY ARTERIES USING LOW OSMOLAR CONTRAST: ICD-10-PCS

## 2025-02-24 PROCEDURE — 83735 ASSAY OF MAGNESIUM: CPT

## 2025-02-24 PROCEDURE — 83880 ASSAY OF NATRIURETIC PEPTIDE: CPT

## 2025-02-24 PROCEDURE — 84484 ASSAY OF TROPONIN QUANT: CPT

## 2025-02-24 RX ADMIN — ATORVASTATIN CALCIUM SCH MG: 40 TABLET, FILM COATED ORAL at 21:06

## 2025-02-24 RX ADMIN — ALBUTEROL SULFATE SCH: 2.5 SOLUTION RESPIRATORY (INHALATION) at 19:00

## 2025-02-24 RX ADMIN — IPRATROPIUM BROMIDE SCH MG: 0.5 SOLUTION RESPIRATORY (INHALATION) at 21:02

## 2025-02-24 RX ADMIN — ALBUTEROL SULFATE SCH MG: 2.5 SOLUTION RESPIRATORY (INHALATION) at 20:59

## 2025-02-24 RX ADMIN — ENOXAPARIN SODIUM SCH MG: 80 INJECTION SUBCUTANEOUS at 21:06

## 2025-02-24 RX ADMIN — IPRATROPIUM BROMIDE SCH: 0.5 SOLUTION RESPIRATORY (INHALATION) at 19:00

## 2025-02-24 NOTE — P.CNS
Date of Consult: 02/24/25


Chief Complaint: SOB


History of Present Illness: 





Patient with PMH of COPD on home oxygen, tobacco use, presented with worsening 

SOB, denies chest pain, no palpitations, no syncope.


Allergies





No Known Drug Allergies Allergy (Verified 04/14/18 01:40)


   Unknown


No Known Allergies Allergy (Uncoded 04/14/18 01:10)


   Unknown





Home medications list reviewed: Yes


Home Medications: 








Fluticasone/Salmeterol [Advair 250/50 Diskus*] 1 puff IH BID #1 disk 05/26/15 


Ipratropium/Albuterol Sulfate [Combivent Respimat  Mcg] 1 puff IH BID 

04/14/18 


Fluticasone Propion/Salmeterol [Advair 250-50 Diskus] 1 each IH BID #1 

blst.w.dev 04/15/18 


Nicotine [Nicoderm*] 21 mg TD DAILY #30 patch.td24 04/15/18 


levoFLOXacin [Levaquin*] 750 mg PO DAILY #5 tab 02/11/25 


predniSONE [Deltasone*] 10 mg PO BID 7 Days #14 tab 02/11/25 








- Past Medical/Surgical History


Diabetic: No


-: COPD


-: tobacco abuse


-: HTN


-: dyslipidemia


-: TUBERCULOSIS IN 1980'S





- Family History


  ** Mother


Medical History: Heart disease


Notes: had pacemaker





- Social History


Smoking Status: Current every day smoker


Alcohol use: No


CD- Drugs: Yes


Caffeine use: Yes





Review of Systems


10-point ROS is otherwise unremarkable





Physical Examination


General: Alert, In no apparent distress


HEENT: Atraumatic, PERRLA, Mucous membr. moist/pink, EOMI, Sclerae nonicteric


Neck: Supple, 2+ carotid pulse no bruit, No LAD, Without JVD or thyroid 

abnormality


Respiratory: Clear to auscultation bilaterally, Normal air movement


Cardiovascular: Regular rate/rhythm, Normal S1 S2


Gastrointestinal: Normal bowel sounds, No tenderness


Musculoskeletal: No tenderness


Integumentary: No rashes


Neurological: Normal gait, Normal speech, Normal tone, Normal affect


Lymphatics: No axilla or inguinal lymphadenopathy


Laboratory Data (last 24 hrs)











  02/24/25 02/24/25





  15:35 14:00


 


WBC   11.50 H


 


Hgb   16.2


 


Hct   49.0


 


Plt Count   272


 


Sodium  137 


 


Potassium  4.2 


 


BUN  14 


 


Creatinine  1.03 


 


Glucose  96 











- Problems


(1) NSTEMI (non-ST elevated myocardial infarction)


Current Visit: Yes   Status: Acute   


Plan: 


Patient denies chest pain but first set of enzymes is elevated, which can be 

demand ischemia from his COPD, although patient got multiple risk factors for 

CAD.


Keep patient NPO after midnight


trend cardiac enzymes for 3 sets, if troponin trending down then get nuclear 

stress test (Lexiscan in am)


ASA 81 mg daily


Lipitor 40 mg daily


Heparin drip


get Echo











(2) Tachycardia


Current Visit: Yes   Status: Acute   


Plan: 


most likely secondary to his COPD exacerbation, continue to monitor on tele











(3) COPD exacerbation


Current Visit: No   Status: Acute   


Plan: 


per primary team.

## 2025-02-24 NOTE — P.HP
Patient History


Date of Service: 02/24/25


Reason for admission: SOB


History of Present Illness: 





This is a 69-year-old male with a past medical history of COPD on home oxygen, 

current tobacco use, hypertension and, dyslipidemia presenting with shortness of

breath that has been worsening over the last day.  He states he is on 2 to 3 L 

of oxygen at home.  He denies any fevers alcohol use or illicit drug use.  He is

a current every day smoker. He is tripoding.  He does endorse some mild chest.  

He rates the discomfort at a 7 out of 10.  Associated symptoms include cough


Allergies





No Known Drug Allergies Allergy (Verified 04/14/18 01:40)


   Unknown


No Known Allergies Allergy (Uncoded 04/14/18 01:10)


   Unknown





Home Medications: 








Fluticasone/Salmeterol [Advair 250/50 Diskus*] 1 puff IH BID #1 disk 05/26/15 


Ipratropium/Albuterol Sulfate [Combivent Respimat  Mcg] 1 puff IH BID 

04/14/18 


Fluticasone Propion/Salmeterol [Advair 250-50 Diskus] 1 each IH BID #1 

blst.w.dev 04/15/18 


Nicotine [Nicoderm*] 21 mg TD DAILY #30 patch.td24 04/15/18 


levoFLOXacin [Levaquin*] 750 mg PO DAILY #5 tab 02/11/25 


predniSONE [Deltasone*] 10 mg PO BID 7 Days #14 tab 02/11/25 








- Past Medical/Surgical History


Diabetic: No


-: COPD


-: tobacco abuse


-: HTN


-: dyslipidemia


-: TUBERCULOSIS IN 1980'S





- Family History


  ** Mother


-: Heart disease


Notes: had pacemaker





- Social History


Smoking Status: Current every day smoker


Counseled patient to stop smoking for: less than 10 minutes


Smoking therapy provided: Yes


Alcohol use: No


CD- Drugs: Yes


Caffeine use: Yes





Review of Systems


General: Unremarkable


Eyes: Unremarkable


ENT: Unremarkable


Respiratory: Cough, Shortness of Breath


Cardiovascular: Unremarkable


Gastrointestinal: Unremarkable


Musculoskeletal: Unremarkable


Integumentary: Unremarkable


Neurological: Unremarkable


Lymphatics: Unremarkable





Physical Examination





- Physical Exam


General: Alert, Oriented x3, Moderate distress


HEENT: Atraumatic, Normocephalic


Neck: Supple


Respiratory: Expiratory wheezes, Inspiratory wheezes


Cardiovascular: No edema, Normal pulses


Capillary refill: <2 Seconds


Gastrointestinal: Normal bowel sounds


Musculoskeletal: No clubbing, Swelling


Integumentary: No rashes


Neurological: Normal gait





- Studies


Laboratory Data (last 24 hrs)











  02/24/25 02/24/25 02/24/25





  17:01 17:00 15:35


 


WBC   


 


Hgb   


 


Hct   


 


Plt Count   


 


PT  Cancelled  12.0 


 


INR  Cancelled  1.06 


 


APTT   26.5 


 


Sodium    137


 


Potassium    4.2


 


BUN    14


 


Creatinine    1.03


 


Glucose    96














  02/24/25





  14:00


 


WBC  11.50 H


 


Hgb  16.2


 


Hct  49.0


 


Plt Count  272


 


PT 


 


INR 


 


APTT 


 


Sodium 


 


Potassium 


 


BUN 


 


Creatinine 


 


Glucose 











Assessment and Plan





- Plan


Shortness of breath


NSTEMI


Elevated troponin


COPD exacerbation


Acute on chronic respiratory failure


Leukocytosis


Tachycardia





-Admit plan for possible heart cath in the a.m.


-Will continue to trend troponins, could be demand ischemia


-Cardiology consulted in the ED


-Continue ASA, statin, therapeutic Lovenox


-Echo pending


-Start nebulized albuterol and nebulized ipratropium


-Start steroids





DVT prophylaxis with weight-based Lovenox





- Advance Directives


Does patient have a Living Will: No


Does patient have a Durable POA for Healthcare: No

## 2025-02-24 NOTE — ER
Nurse's Notes                                                                                     

 [facility]                                                                 

Name: Epifanio Quinones                                                                            

Age: 69 yrs                                                                                       

Sex: Male                                                                                         

: 1955                                                                                   

MRN: [id number]                                                                                   

Arrival Date: 2025                                                                          

Time: 12:50                                                                                       

Account#: [id number]                                                                            

Bed 14                                                                                            

Private MD:                                                                                       

Diagnosis: Subsequent non-ST elevation (NSTEMI) myocardial infarction;Shortness of breath;COPD/   

  Chronic obstructive pulmonary disease with (acute) exacerbation                                 

                                                                                                  

Presentation:                                                                                     

                                                                                             

12:53 Chief complaint: EMS states: DIFFICULTY BREATHING WITH SOB, COPD X 1 WEEK. STATES SEEN  db  

      2 WEEKS AGO FOR SIMILAR COMPLAINT. PT REPORTS TAKING BREATHING TREATMENT PRIOR TO EMS       

      ARRIVAL. 92% RA PLACED ON 2L NC O2 BY EMS. Coronavirus screen: Client denies travel out     

      of the [place] in the last 14 days. At this time, the client does not indicate any             

      symptoms associated with coronavirus-19. Ebola Screen: Patient negative for fever           

      greater than or equal to 101.5 degrees Fahrenheit, and additional compatible Ebola          

      Virus Disease symptoms Patient denies exposure to infectious person. Patient denies         

      travel to an Ebola-affected area in the 21 days before illness onset. No symptoms or        

      risks identified at this time. Initial Sepsis Screen: Does the patient meet any 2           

      criteria? No. Patient's initial sepsis screen is negative. Does the patient have a          

      suspected source of infection? No. Patient's initial sepsis screen is negative. Risk        

      Assessment: Do you want to hurt yourself or someone else? Patient reports no desire to      

      harm self or others. Onset of symptoms was 2025. Care prior to arrival:        

      Oxygen administered. via nasal cannula.                                                     

12:53 Method Of Arrival: EMS: [facility]                                                    db  

12:53 Acuity: AMBER 2                                                                           db  

                                                                                                  

Triage Assessment:                                                                                

13:05 General: Appears in no apparent distress. uncomfortable, Behavior is calm, cooperative. db  

      Pain: Denies pain. Neuro: Level of Consciousness is awake, alert, obeys commands,           

      Oriented to person, place, time, situation. Respiratory: Reports shortness of breath at     

      rest Airway is patent Respiratory effort is even, unlabored, Respiratory pattern is         

      regular, symmetrical, Breath sounds are coarse bilaterally. Onset: The symptoms/episode     

      began/occurred gradually, the patient has mild shortness of breath.                         

                                                                                                  

Historical:                                                                                       

- Allergies:                                                                                      

13:05 No Known Allergies;                                                                     db  

- PMHx:                                                                                           

13:05 COPD; Hypertension;                                                                     db  

                                                                                                  

- Immunization history:: Adult Immunizations unknown.                                             

- Infectious Disease History:: Denies.                                                            

- Social history:: Smoking status: Patient reports the use of cigarette tobacco                   

  products, smokes one pack cigarettes per day.                                                   

                                                                                                  

                                                                                                  

Screenin:06 [facility] ED Fall Risk Assessment (Adult) History of falling in the last 3 months,       db  

      including since admission No falls in past 3 months (0 pts) Confusion or Disorientation     

      No (0 pts) Intoxicated or Sedated No (0 pts) Impaired Gait No (0 pts) Mobility Assist       

      Device Used No (0 pt) Altered Elimination No (0 pt) Score/Fall Risk Level 0 - 2 = Low       

      Risk Oriented to surroundings, Maintained a safe environment. Abuse screen: Denies          

      threats or abuse. Denies injuries from another. Nutritional screening: No deficits          

      noted. Tuberculosis screening: No symptoms or risk factors identified.                      

                                                                                                  

Assessment:                                                                                       

13:06 Reassessment: SEE TRIAGE FOR INITIAL ASSESSMENT.                                        db  

14:04 Reassessment: Patient appears in no apparent distress at this time. Patient and/or      db  

      family updated on plan of care and expected duration. Pain level reassessed. Patient is     

      alert, oriented x 3, equal unlabored respirations, skin warm/dry/pink. General: Appears     

      in no apparent distress. comfortable, Behavior is calm, cooperative. Pain: Denies pain.     

      Neuro: Level of Consciousness is awake, alert, obeys commands, Oriented to person,          

      place, time, situation. Cardiovascular: Reports shortness of breath, Capillary refill <     

      3 seconds Patient's skin is warm and dry. Rhythm is sinus tachycardia. Respiratory:         

      Reports cough that is Airway is patent Respiratory effort is even, unlabored,               

      Respiratory pattern is regular, symmetrical.                                                

14:40 Reassessment: CALLED LAB FOR RECOLLECT.                                                 db  

15:30 Reassessment: Patient appears in no apparent distress at this time. Patient and/or      db  

      family updated on plan of care and expected duration. Pain level reassessed. Patient is     

      alert, oriented x 3, equal unlabored respirations, skin warm/dry/pink.                      

16:48 Reassessment: Patient appears in no apparent distress at this time. No changes from     db  

      previously documented assessment. Patient and/or family updated on plan of care and         

      expected duration. Pain level reassessed. Patient is alert, oriented x 3, equal             

      unlabored respirations, skin warm/dry/pink.                                                 

17:38 Reassessment: Patient appears in no apparent distress at this time. Patient and/or      db  

      family updated on plan of care and expected duration. Pain level reassessed. Patient is     

      alert, oriented x 3, equal unlabored respirations, skin warm/dry/pink. Respiratory:         

      Airway is patent Respiratory effort is even, unlabored, Respiratory pattern is regular,     

      symmetrical.                                                                                

18:30 Reassessment: Patient appears in no apparent distress at this time. Patient and/or      db  

      family updated on plan of care and expected duration. Pain level reassessed. Patient is     

      alert, oriented x 3, equal unlabored respirations, skin warm/dry/pink.                      

19:00 Reassessment: Pt alert and oriented, respiratory distress noted, on 3L NC pt saturation ay  

      above 96%. On heparin gtt at 800 units/hr.                                                  

                                                                                                  

Vital Signs:                                                                                      

12:53  / 109; Pulse 120; Resp 22; Temp 98.5(O); Pulse Ox 92% on R/A; Weight 65.77 kg;   db  

      Height 5 ft. 8 in. ;                                                                        

14:00  / 111; Pulse 110; Resp 24; Pulse Ox 100% on 2 lpm NC;                            db  

15:00  / 93; Pulse 115; Resp 28; Pulse Ox 96% on 2 lpm NC;                              db  

16:30  / 94; Pulse 128; Resp 24; Pulse Ox 97% on 2 lpm NC;                              db  

17:35  / 97; Pulse 120; Resp 24; Pulse Ox 96% 2 lpm ;                                   db  

18:30  / 95; Pulse 120; Resp 22; Pulse Ox 97% on 2 lpm NC;                              db  

12:53 Body Mass Index 22.05 (65.77 kg, 172.72 cm)                                             db  

12:53 PLACED ON 2L NC O2                                                                      db  

                                                                                                  

ED Course:                                                                                        

12:50 Initial Neb Treatment Given as ordered Patient was instructed and evaluated on          db  

      procedure Patient tolerated procedure well without adverse effect.                          

12:53 Arm band placed on Patient placed in an exam room.                                      db  

13:01 Patient arrived in ED.                                                                  db  

13:05 Triage completed.                                                                       db  

13:07 Aissatou Regan, RN is Primary Nurse.                                                  db  

13:13 Gurdeep Osuna DO is Attending Physician.                                                ms3 

14:00 Initial lab(s) drawn, by me, sent to lab. Inserted saline lock: 22 gauge in right hand, db  

      using aseptic technique. Blood collected. Flushed with 10 mL NS.                            

14:04 Patient has correct armband on for positive identification. Bed in low position. Call   db  

      light in reach. Side rails up X 1. Client placed on continuous cardiac and pulse            

      oximetry monitoring. NIBP monitoring applied. Cardiac monitor on. Pulse ox on. NIBP on.     

      Warm blanket given. Pillow given.                                                           

14:14 XRAY Chest (1 view) In Process Unspecified.                                             EDMS

15:35 Lab(s) recollected, by me, sent to lab.                                                 db  

16:25 Leo Salinas MD is Hospitalizing Provider.                                              ms3 

20:51 No provider procedures requiring assistance completed. Patient admitted, IV remains in  ha1 

      place.                                                                                      

                                                                                                  

Administered Medications:                                                                         

15:30 Drug: DuoNeb Nebulize (2.5 mg - 0.5 mg) 3 ml Nebulizer once Route: Nebulizer;           db  

15:32 Drug: MethylPrednisoLONE  mg IVP once Route: IVP; Site: right hand;              db  

17:18 Drug: Aspirin PO Chewable Tablet 324 mg PO once; 81 mg tablets x 4 Route: PO;           db  

17:28 Drug: Heparin (MI-Bolus No thrombolytic) - HEParin IVP 60 units/kg IVP once; Max 5000   db  

      units {Co-Signature: aa5 (Nessa Jones RN).} Route: IVP; Site: right hand;               

17:30 Drug: Heparin (MI Drip) 12 units/kg/[place] - ([place] [address] units, D5W  ml) IV at  db  

      calculated rate Per protocol; Max initial rate 1000 units/hr {Co-Signature: aa5             

      (Nessa Jones RN).} {Note: STARTED AT 800UNITS/HR.} Route: IV; Rate: calculated          

      rate; Site: right hand;                                                                     

                                                                                                  

                                                                                                  

Medication:                                                                                       

13:06 VIS not applicable for this client.                                                     db  

                                                                                                  

Outcome:                                                                                          

16:25 Decision to Hospitalize by Provider.                                                    ms3 

20:51 Admitted to Tele accompanied by tech, via stretcher, room 408,                          ha1 

20:51 Condition: stable                                                                           

20:51 Instructed on the need for admit, Demonstrated understanding of instructions,               

20:52 Patient left the ED.                                                                    ha1 

                                                                                                  

Signatures:                                                                                       

Dispatcher [employer]                           EDMS                                                 

Gurdeep Osuna DO                        DO   ms3                                                  

Luma Kaur RN RN   ha1                                                  

Aissatou Regan RN                    RN   db                                                   

Yakubu, AwSTEPHANIE nguyen RN, Audri RN                           aa5                                                  

                                                                                                  

Corrections: (The following items were deleted from the chart)                                    

13:06 13:05 Social history: Smoking status: Patient denies any tobacco usage or history of. dbdb  

                                                                                                  

**************************************************************************************************

## 2025-02-24 NOTE — RAD REPORT
EXAM: Chest Single View



HISTORY: COPD



COMPARISON: 2/11/2025



FINDINGS:

LUNGS/PLEURA: The lungs are clear. No pleural effusions or pneumothorax. No pulmonary edema. Emphysem
a.

MEDIASTINUM: The mediastinal silhouette is within normal limits.

CARDIAC: The cardiac silhouette is within normal limits.

UPPER ABDOMEN: No significant abnormality.

BONES: No acute abnormality.

LINES/TUBES/OTHER: N/A



IMPRESSION:

Emphysema without superimposed acute process.



Reported By: Zachary Brown 

Electronically Signed:  2/24/2025 3:08 PM

## 2025-02-24 NOTE — EDPHYS
Physician Documentation                                                                           

 [facility]                                                                 

Name: Epifanio Quinones                                                                            

Age: 69 yrs                                                                                       

Sex: Male                                                                                         

: 1955                                                                                   

MRN: [id number]                                                                                   

Arrival Date: 2025                                                                          

Time: 12:50                                                                                       

Account#: [id number]                                                                            

Bed 14                                                                                            

Private MD:                                                                                       

ED Physician Gurdeep Osuna                                                                         

HPI:                                                                                              

                                                                                             

16:22 This 69 yrs old Male presents to ER via EMS with complaints of Breathing Difficulty,    ms3 

      Shortness Of Breath.                                                                        

16:22 69-year-old male with past medical history of COPD and hypertension presents to the     [facility] 

      emergency department for shortness of breath, cough with congestion. Patient denies         

      pain. Patient states he is on home oxygen at 2 to 3 L. Patient states that shortness of     

      breath has recently become worse..                                                          

                                                                                                  

Historical:                                                                                       

- Allergies:                                                                                      

13:05 No Known Allergies;                                                                     db  

- PMHx:                                                                                           

13:05 COPD; Hypertension;                                                                     db  

                                                                                                  

- Immunization history:: Adult Immunizations unknown.                                             

- Infectious Disease History:: Denies.                                                            

- Social history:: Smoking status: Patient reports the use of cigarette tobacco                   

  products, smokes one pack cigarettes per day.                                                   

                                                                                                  

                                                                                                  

ROS:                                                                                              

16:22 Constitutional: Negative for fever, and chills. Cardiovascular: Negative for chest      ms3 

      pain, and palpitations. Abdomen/GI: Negative for abdominal pain, nausea, vomiting,          

      diarrhea, and constipation, MS/Extremity: Negative for injury and deformity,                

16:22 Respiratory: Positive for cough, shortness of breath, wheezing,                             

                                                                                                  

Exam:                                                                                             

16:22 Constitutional:  This is a well developed, well nourished patient who is awake, alert,  ms3 

      and in no acute distress. Cardiovascular:  Regular rate and rhythm with a normal S1 and     

      S2.  No gallops, murmurs, or rubs.  Normal PMI, no JVD.  No pulse deficits. Abdomen/GI:     

       Soft, non-tender, with normal bowel sounds.  No distension or tympany.  No guarding or     

      rebound.  No evidence of tenderness throughout.                                             

16:22 ECG was reviewed by the Attending Physician.                                                

16:22 Respiratory: the patient does not display signs of respiratory distress,  Respirations:     

      labored breathing, that is mild, Breath sounds: wheezing: expiratory that is moderate,      

      is heard in the  left posterior upper lobe, right posterior upper lobe, left posterior      

      lower lobe, right posterior middle lobe and right posterior lower lobe,                     

                                                                                                  

Vital Signs:                                                                                      

12:53  / 109; Pulse 120; Resp 22; Temp 98.5(O); Pulse Ox 92% on R/A; Weight 65.77 kg;   db  

      Height 5 ft. 8 in. ;                                                                        

14:00  / 111; Pulse 110; Resp 24; Pulse Ox 100% on 2 lpm NC;                            db  

15:00  / 93; Pulse 115; Resp 28; Pulse Ox 96% on 2 lpm NC;                              db  

16:30  / 94; Pulse 128; Resp 24; Pulse Ox 97% on 2 lpm NC;                              db  

17:35  / 97; Pulse 120; Resp 24; Pulse Ox 96% 2 lpm ;                                   db  

18:30  / 95; Pulse 120; Resp 22; Pulse Ox 97% on 2 lpm NC;                              db  

12:53 Body Mass Index 22.05 (65.77 kg, 172.72 cm)                                             db  

12:53 PLACED ON 2L NC O2                                                                      db  

                                                                                                  

MDM:                                                                                              

13:28 Medical Screening Exam initiated                                                        ms3 

16:20 Differential diagnosis: CHF exacerbation, Chronic Obstructive Pulmonary Disease         ms3 

      Myocardial Infarction pulmonary edema, Pulmonary Embolism. Data reviewed: vital signs,      

      nurses notes, lab test result(s), EKG, radiologic studies, and as a result, I will          

      admit patient. Consideration of Admission/Observation Patient was admitted/placed on        

      observation. Management of patient was discussed with the following: Consultant: Dr Ulloa- Recommends Heparin, NPO after midnight. Management of patient was discussed         

      with the following: Hospitalist: Dr Salinas. I considered the following discharge              

      prescriptions or medication management in the emergency department Medications were         

      administered in the Emergency Department. See MAR. Independent interpretation of the        

      following test(s) in the Emergency Department EKG: See my EKG interpretation above.         

      Counseling: I had a detailed discussion with the patient and/or guardian regarding the      

      historical points, exam findings, and any diagnostic results supporting the                 

      discharge/admit diagnosis, lab results, radiology results, the need for further work-up     

      and treatment in the hospital. ED course: Discussed case with Dr. Ulloa he recommends      

      heparin drip. He will consult on patient. Discussed plan with patient he understands        

      and agrees with plan. All questions were answered.                                          

                                                                                                  

                                                                                             

13:29 Order name: Basic Metabolic Panel; Complete Time: 16:19                                 ms3 

                                                                                             

13:29 Order name: CBC with Diff; Complete Time: 15:46                                         ms3 

                                                                                             

13:29 Order name: Troponin HS; Complete Time: 16:19                                           ms3 

                                                                                             

16:47 Order name: Ptt, Activated; Complete Time: 18:47                                        db  

                                                                                             

17:10 Order name: Protime (+INR); Complete Time: 18:47                                        EDMS

                                                                                             

17:25 Order name: NT PRO-BNP                                                                  EDMS

                                                                                             

17:25 Order name: Thyroid Stimulating Hormone                                                 EDMS

                                                                                             

17:25 Order name: CBC with Automated Diff                                                     EDMS

                                                                                             

17:25 Order name: CBC with Automated Diff                                                     EDMS

                                                                                             

17:25 Order name: Comprehensive Metabolic Panel                                               EDMS

                                                                                             

17:25 Order name: Comprehensive Metabolic Panel                                               EDMS

                                                                                             

17:25 Order name: Lipid Profile                                                               EDMS

                                                                                             

17:25 Order name: Lipid Profile                                                               EDMS

                                                                                             

17:25 Order name: Lipid Profile                                                               EDMS

                                                                                             

17:25 Order name: Lipid Profile                                                               EDMS

                                                                                             

17:25 Order name: Magnesium                                                                   EDMS

                                                                                             

17:25 Order name: Magnesium                                                                   EDMS

                                                                                             

17:25 Order name: Troponin High Sensitivity                                                   EDMS

                                                                                             

17:25 Order name: Troponin High Sensitivity                                                   EDMS

                                                                                             

17:25 Order name: Troponin High Sensitivity                                                   EDMS

                                                                                             

17:25 Order name: Troponin High Sensitivity                                                   EDMS

                                                                                             

17:25 Order name: Troponin High Sensitivity                                                   EDMS

                                                                                             

17:25 Order name: Urinalysis w/ reflexes                                                      EDMS

                                                                                             

17:26 Order name: Blood Culture                                                               EDMS

                                                                                             

17:26 Order name: Sputum Culture                                                              EDMS

                                                                                             

13:29 Order name: XRAY Chest (1 view); Complete Time: 15:46                                   ms3 

                                                                                             

17:25 Order name: Echo with Doppler                                                           EDMS

                                                                                             

13:29 Order name: EKG; Complete Time: 13:30                                                   ms3 

                                                                                             

17:25 Order name: Physical Therapy Consult                                                    EDMS

                                                                                             

13:29 Order name: Cardiac monitoring; Complete Time: 14:03                                    ms3 

                                                                                             

13:29 Order name: EKG - Nurse/Tech; Complete Time: 14:03                                      ms3 

                                                                                             

13:29 Order name: IV Saline Lock; Complete Time: 14:03                                        ms3 

                                                                                             

13:29 Order name: Labs collected and sent; Complete Time: 14:03                               ms3 

                                                                                             

13:29 Order name: O2 Per Protocol; Complete Time: 14:03                                       ms3 

                                                                                             

13:29 Order name: O2 Sat Monitoring; Complete Time: 14:03                                     ms3 

                                                                                             

14:21 Order name: Labs - recollect needed: recollect green top; Complete Time: 15:37          bd  

                                                                                                  

EC:22 Rate is 109 beats/min. Rhythm is regular. QRS Axis is Normal. VT interval is normal.    ms3 

      QRS interval is normal. Clinical impression: Sinus tachycardia. Interpreted by me.          

      Reviewed by me.                                                                             

                                                                                                  

Administered Medications:                                                                         

15:30 Drug: DuoNeb Nebulize (2.5 mg - 0.5 mg) 3 ml Nebulizer once Route: Nebulizer;           db  

15:32 Drug: MethylPrednisoLONE  mg IVP once Route: IVP; Site: right hand;              db  

17:18 Drug: Aspirin PO Chewable Tablet 324 mg PO once; 81 mg tablets x 4 Route: PO;           db  

17:28 Drug: Heparin (MI-Bolus No thrombolytic) - HEParin IVP 60 units/kg IVP once; Max 5000   db  

      units {Co-Signature: aa5 (Nessa Jones RN).} Route: IVP; Site: right hand;               

17:30 Drug: Heparin (MI Drip) 12 units/kg/hr - ([place] [address] units, D5W  ml) IV at  db  

      calculated rate Per protocol; Max initial rate 1000 units/hr {Co-Signature: aa5             

      (Nessa Jones RN).} {Note: STARTED AT 800UNITS/HR.} Route: IV; Rate: calculated          

      rate; Site: right hand;                                                                     

                                                                                                  

                                                                                                  

Disposition:                                                                                      

16:25 Critical Care:.                                                                         ms3 

                                                                                                  

Disposition Summary:                                                                              

25 16:25                                                                                    

Hospitalization Ordered                                                                           

 Notes:       Hospitalization Status: Inpatient Admission                                           
  ms3

      Provider: Leo Salinas                                                                   ms3 

      Location: Telemetry/MedSurg (Inpatient)                                                 ms3 

      Condition: Stable                                                                       ms3 

      Problem: new                                                                            ms3 

      Symptoms: are unchanged                                                                 ms3 

      Bed/Room Type: Standard                                                                 ms3 

      Room Assignment: 405(25 18:15)                                                    bd  

      Diagnosis                                                                                   

        - Subsequent non-ST elevation (NSTEMI) myocardial infarction                          ms3 

        - Shortness of breath                                                                 ms3 

        - COPD/ Chronic obstructive pulmonary disease with (acute) exacerbation               ms3 

      Forms:                                                                                      

        - Medication Reconciliation Form                                                      ms3 

        - SBAR form                                                                           ms3 

        - Leadership Thank You Letter                                                         ms3 

Critical care time excluding procedures:                                                          

16:25 Critical care time: Bedside Care: 40 minutes, Consultation: 10 minutes. Total time: 50  ms3 

      minutes                                                                                     

                                                                                                  

Signatures:                                                                                       

Dispatcher [employer]                           EDMS                                                 

Suad Arciniega Marcus, DO                        DO   ms3                                                  

Aissatou Regan, RN                    RN   db                                                   

Nessa Jones RN                           aa5                                                  

                                                                                                  

Corrections: (The following items were deleted from the chart)                                    

13:06 13:05 Social history: Smoking status: Patient denies any tobacco usage or history of. dbdb  

17:09 17:02 PROTIME (+INR)+COAG.LAB.BRZ ordered. EDMS                                         EDMS

18:15 16:25 ms3                                                                               bd  

                                                                                                  

**************************************************************************************************

## 2025-02-25 VITALS — OXYGEN SATURATION: 98 % | DIASTOLIC BLOOD PRESSURE: 86 MMHG | SYSTOLIC BLOOD PRESSURE: 141 MMHG

## 2025-02-25 VITALS — TEMPERATURE: 98.1 F

## 2025-02-25 LAB
ALBUMIN SERPL BCP-MCNC: 3 G/DL (ref 3.4–5)
ALBUMIN/GLOB SERPL: 0.7 {RATIO} (ref 1.1–1.8)
ALP SERPL-CCNC: 105 U/L (ref 45–117)
ALT SERPL W P-5'-P-CCNC: 48 U/L (ref 16–61)
ANION GAP SERPL CALC-SCNC: 9.1 MEQ/L (ref 5–15)
AST SERPL W P-5'-P-CCNC: 27 U/L (ref 15–37)
BUN BLD-MCNC: 25 MG/DL (ref 7–18)
GLOBULIN SER CALC-MCNC: 4.5 G/DL (ref 2.3–3.5)
GLUCOSE SERPLBLD-MCNC: 150 MG/DL (ref 74–106)
HCT VFR BLD CALC: 46.1 % (ref 39.6–49)
HDLC SERPL-MCNC: 99 MG/DL (ref 40–60)
HGB BLD-MCNC: 15.8 G/DL (ref 13.6–17.9)
LDLC SERPL CALC-MCNC: 124 MG/DL (ref ?–130)
LDLC SERPL-MCNC: 124 MG/DL
LYMPHOCYTES # SPEC AUTO: 0.4 K/UL (ref 0.7–4.9)
MAGNESIUM SERPL-MCNC: 2.7 MG/DL (ref 1.6–2.4)
MCH RBC QN AUTO: 31.4 PG (ref 27–35)
MCHC RBC AUTO-ENTMCNC: 34.2 G/DL (ref 32–36)
MCV RBC: 91.7 FL (ref 80–100)
NRBC # BLD: 0 10*3/UL (ref 0–0)
NRBC BLD AUTO-RTO: 0 % (ref 0–0)
PMV BLD: 8.3 FL (ref 7.6–11.3)
POTASSIUM SERPL-SCNC: 5.1 MEQ/L (ref 3.5–5.1)
RBC # BLD: 5.03 M/UL (ref 4.33–5.43)
WBC # BLD AUTO: 8.6 THOU/UL (ref 4.3–10.9)

## 2025-02-25 RX ADMIN — LEVALBUTEROL HYDROCHLORIDE SCH MG: 1.25 SOLUTION RESPIRATORY (INHALATION) at 13:25

## 2025-02-25 RX ADMIN — IPRATROPIUM BROMIDE SCH MG: 0.5 SOLUTION RESPIRATORY (INHALATION) at 13:25

## 2025-02-25 RX ADMIN — ASPIRIN SCH MG: 325 TABLET ORAL at 08:53

## 2025-02-25 RX ADMIN — METOPROLOL TARTRATE SCH: 25 TABLET ORAL at 06:00

## 2025-02-25 NOTE — P.PN
Date of Service: 02/25/25





Subjective: Had a bad last night last.  States he was up coughing for most of 

the night.  Associated symptoms include sputum production, mild headache.  He 

rates the cough as a 9 out of 10.  He denies any chest pain.





Review of Systems


General: Unremarkable


Eyes: Unremarkable


ENT: Unremarkable


Respiratory: Cough, Shortness of Breath


Cardiovascular: Unremarkable


Gastrointestinal: Unremarkable


Musculoskeletal: Unremarkable


Integumentary: Unremarkable


Neurological: Unremarkable


Lymphatics: Unremarkable





Physical Examination





- Physical Exam


General: Alert, Oriented x3, Moderate distress


HEENT: Atraumatic, Normocephalic


Neck: Supple


Respiratory: Expiratory wheezes, Inspiratory wheezes, coarse breath sounds 

bilaterally


Cardiovascular: No edema, Normal pulses


Capillary refill: <2 Seconds


Gastrointestinal: Normal bowel sounds


Musculoskeletal: No clubbing, Swelling


Integumentary: No rashes


Neurological: Normal gait





- Studies


Laboratory Data (last 24 hrs)











  02/24/25 02/24/25 02/24/25





  17:01 17:00 15:35


 


WBC   


 


Hgb   


 


Hct   


 


Plt Count   


 


PT  Cancelled  12.0 


 


INR  Cancelled  1.06 


 


APTT   26.5 


 


Sodium    137


 


Potassium    4.2


 


BUN    14


 


Creatinine    1.03


 


Glucose    96














  02/24/25





  14:00


 


WBC  11.50 H


 


Hgb  16.2


 


Hct  49.0


 


Plt Count  272


 


PT 


 


INR 


 


APTT 


 


Sodium 


 


Potassium 


 


BUN 


 


Creatinine 


 


Glucose 











Assessment and Plan





- Plan


Shortness of breath


NSTEMI


Elevated troponin


COPD exacerbation


Acute on chronic respiratory failure


Tachycardia


Hyponatremia


Hyperlipidemia


Leukocytosis resolved








-Echo pending


-Troponin downtrending could be due to demand ischemia


-Cardiology consulted in the ED


-Continue ASA, statin, therapeutic Lovenox


-Echo pending


-Switched over to levalbuterol for the tachycardia


-Start IV Solu-Medrol


-Blood cultures with no growth to date


-Lipid panel reviewed.  Elevated LDL.  Elevated total cholesterol





DVT prophylaxis with weight-based Lovenox





- Advance Directives


Does patient have a Living Will: No


Does patient have a Durable POA for Healthcare: No

## 2025-02-25 NOTE — OP
Date of Procedure:  02/25/2025



Surgeon:  Luis Ulloa



Procedure Performed:  Left heart catheterization with selective coronary angiogram.



Indication For Procedure:  Non-ST-elevation MI.



Complications:  None.



Estimated Blood Loss:  Less than 50 cc.



Access:  Right ulnar, closed by TR band.



Sedation Time:  20 minutes with 1 of Versed and 25 of fentanyl.



Description Of Procedure:  After risks, benefits, and alternatives were explained to the patient, the
 patient agreed to proceed with procedure and signed informed consent.  The patient was brought back 
to the cath lab, prepped and draped in sterile fashion.  Time-out was performed.  Sedation was admini
stered.  Next, we tried the right radial artery to access, but it was too small, so we switched to th
e right ulnar artery.  6-Urdu sheath was introduced without any difficulty.  Tiger 4 catheter was a
dvanced over J-wire to the LV cavity.  LVEDP was obtained.  Pullback did not show any gradient.  Same
 catheter was used for selective angiogram of the left and right coronary systems, and then this cath
eter was pulled back to do nonselective angiogram of the left subclavian and LIMA arteries.  At the e
nd of the procedure, catheter was removed over a J-wire.  Sheath was removed.  TR band was applied, h
emostasis achieved, and the patient was moved back to recovery room in stable condition.



Findings:  

1. Left main:  Ostial 30% disease, then distal 50% disease, calcified into the LAD.

2. LAD with ostial 70% disease, then proximal mild luminal irregularities, followed by mid long diffu
se disease ranging between 70% to 95% with mid to distal mild luminal irregularities.

3. Left circumflex:  Mild luminal irregularities.

4. OM1:  Large with a proximal 80% disease, then mild luminal irregularities.

5. RCA:  Dominant with proximal 50% disease, then mild luminal irregularities, followed by mid 50% di
sease, then distal mild luminal irregularities.

6. RPDA:  Mild luminal irregularities.

7. Subclavian artery/LIMA:  Patent.

8. LVEDP:  10 mmHg.



Assessment And Plan:  Significant multivessel disease, including distal left main, ostial left anteri
or descending, mid left anterior descending, and obtuse marginal 1.  Moderate right coronary artery d
isease. 



Plan is to transfer to [facility] to be evaluated for CABG.





HA/NICOLE

DD:  02/25/2025 12:42:02Voice ID:  [id number]

DT:  02/25/2025 21:19:17Report ID:  [id number]

## 2025-02-25 NOTE — P.PN
Subjective


Date of Service: 02/25/25


Chief Complaint: SOB


Subjective: No new changes (patient still feel shortness of breath with cough)





Review of Systems


10-point ROS is otherwise unremarkable





Physical Examination





- Vital Signs


Temperature: 98.1 F


Blood Pressure: 162/95


Pulse: 104


Respirations: 20


Pulse Ox (%): 96





- Physical Exam


General: Alert, In no apparent distress


HEENT: Atraumatic, PERRLA, EOMI


Neck: Supple, JVD not distended


Respiratory: Clear to auscultation bilaterally, Normal air movement


Cardiovascular: Regular rate/rhythm, Normal S1 S2


Gastrointestinal: Normal bowel sounds, No tenderness


Musculoskeletal: No tenderness


Integumentary: No rashes


Neurological: Normal speech, Normal tone, Normal affect


Lymphatics: No axilla or inguinal lymphadenopathy





- Studies


Laboratory Data (last 24 hrs)











  02/24/25 02/24/25 02/24/25





  17:01 17:00 15:35


 


WBC   


 


Hgb   


 


Hct   


 


Plt Count   


 


PT  Cancelled  12.0 


 


INR  Cancelled  1.06 


 


APTT   26.5 


 


Sodium    137


 


Potassium    4.2


 


BUN    14


 


Creatinine    1.03


 


Glucose    96














  02/24/25





  14:00


 


WBC  11.50 H


 


Hgb  16.2


 


Hct  49.0


 


Plt Count  272


 


PT 


 


INR 


 


APTT 


 


Sodium 


 


Potassium 


 


BUN 


 


Creatinine 


 


Glucose 








Medications List Reviewed: Yes





Assessment And Plan





- Current Problems (Diagnosis)


(1) NSTEMI (non-ST elevated myocardial infarction)


Current Visit: Yes   Status: Acute   


Plan: 


Patient denies chest pain but first set of enzymes is elevated, which can be 

demand ischemia from his COPD, although patient got multiple risk factors for 

CAD.


Keep patient NPO after midnight


cardiac enzymes trending down but will do coronary angiogram due to patient 

multiple risk factors.


ASA 81 mg daily


Lipitor 40 mg daily


Heparin drip


get Echo











(2) Tachycardia


Current Visit: Yes   Status: Acute   


Plan: 


most likely secondary to his COPD exacerbation, continue to monitor on tele











(3) COPD exacerbation


Current Visit: No   Status: Acute   


Plan: 


per primary team.


Patient still feel very short of breath.

## 2025-02-27 NOTE — EKG
Test Date:    2025-02-24               Test Time:    13:51:21

Technician:   GOKUL                                    

                                                     

MEASUREMENT RESULTS:                                       

Intervals:                                           

Rate:         109                                    

SC:           140                                    

QRSD:         66                                     

QT:           342                                    

QTc:          460                                    

Axis:                                                

P:            89                                     

SC:           140                                    

QRS:          82                                     

T:            46                                     

                                                     

INTERPRETIVE STATEMENTS:                                       

                                                     

Sinus tachycardia

Right atrial enlargement

Septal infarct, age undetermined

Abnormal ECG

Compared to ECG 02/09/2025 18:29:10

Myocardial infarct finding now present



Electronically Signed On 02-27-25 16:48:22 CST by Luis Ulloa

## 2025-03-25 ENCOUNTER — HOSPITAL ENCOUNTER (INPATIENT)
Dept: HOSPITAL 97 - ER | Age: 70
LOS: 6 days | Discharge: HOME | DRG: 291 | End: 2025-03-31
Attending: STUDENT IN AN ORGANIZED HEALTH CARE EDUCATION/TRAINING PROGRAM | Admitting: STUDENT IN AN ORGANIZED HEALTH CARE EDUCATION/TRAINING PROGRAM
Payer: COMMERCIAL

## 2025-03-25 VITALS — BODY MASS INDEX: 22.9 KG/M2

## 2025-03-25 DIAGNOSIS — I11.0: Primary | ICD-10-CM

## 2025-03-25 DIAGNOSIS — Z99.81: ICD-10-CM

## 2025-03-25 DIAGNOSIS — Z79.899: ICD-10-CM

## 2025-03-25 DIAGNOSIS — I25.2: ICD-10-CM

## 2025-03-25 DIAGNOSIS — Z23: ICD-10-CM

## 2025-03-25 DIAGNOSIS — I25.10: ICD-10-CM

## 2025-03-25 DIAGNOSIS — E78.5: ICD-10-CM

## 2025-03-25 DIAGNOSIS — Z79.82: ICD-10-CM

## 2025-03-25 DIAGNOSIS — J44.1: ICD-10-CM

## 2025-03-25 DIAGNOSIS — J96.21: ICD-10-CM

## 2025-03-25 DIAGNOSIS — I50.33: ICD-10-CM

## 2025-03-25 DIAGNOSIS — Z95.5: ICD-10-CM

## 2025-03-25 DIAGNOSIS — Z79.52: ICD-10-CM

## 2025-03-25 DIAGNOSIS — E87.1: ICD-10-CM

## 2025-03-25 DIAGNOSIS — F17.210: ICD-10-CM

## 2025-03-25 LAB
ALBUMIN SERPL BCP-MCNC: 2.8 G/DL (ref 3.4–5)
ALBUMIN/GLOB SERPL: 0.7 {RATIO} (ref 1.1–1.8)
ALP SERPL-CCNC: 84 U/L (ref 45–117)
ALT SERPL W P-5'-P-CCNC: 44 U/L (ref 16–61)
ANION GAP SERPL CALC-SCNC: 6.9 MEQ/L (ref 5–15)
AST SERPL W P-5'-P-CCNC: 20 U/L (ref 15–37)
BILIRUB INDIRECT SERPL-MCNC: 0.4 MG/DL (ref 0.2–0.8)
BUN BLD-MCNC: 12 MG/DL (ref 7–18)
COHGB MFR BLDA: 1.8 % (ref 0–1.5)
GLOBULIN SER CALC-MCNC: 3.9 G/DL (ref 2.3–3.5)
GLUCOSE SERPLBLD-MCNC: 107 MG/DL (ref 74–106)
HCT VFR BLD CALC: 41.2 % (ref 39.6–49)
HGB BLD-MCNC: 13.8 G/DL (ref 13.6–17.9)
HGB BLDA-MCNC: 13.9 G/DL (ref 12–18)
INHALED O2 CONCENTRATION: 45 %
LYMPHOCYTES # SPEC AUTO: 1.5 K/UL (ref 0.7–4.9)
MCH RBC QN AUTO: 30.7 PG (ref 27–35)
MCHC RBC AUTO-ENTMCNC: 33.4 G/DL (ref 32–36)
MCV RBC: 91.9 FL (ref 80–100)
NRBC # BLD: 0 10*3/UL (ref 0–0)
NRBC BLD AUTO-RTO: 0 % (ref 0–0)
NT-PROBNP SERPL-MCNC: 572 PG/ML (ref ?–125)
OXYHGB MFR BLDA: 92.4 % (ref 94–97)
PMV BLD: 7.7 FL (ref 7.6–11.3)
POTASSIUM SERPL-SCNC: 3.9 MEQ/L (ref 3.5–5.1)
RBC # BLD: 4.48 M/UL (ref 4.33–5.43)
SAO2 % BLDA: 96.1 % (ref 92–98.5)
TROPONIN I SERPL HS-MCNC: 23.1 PG/ML (ref ?–58.9)
WBC # BLD AUTO: 11.7 THOU/UL (ref 4.3–10.9)

## 2025-03-25 PROCEDURE — 87070 CULTURE OTHR SPECIMN AEROBIC: CPT

## 2025-03-25 PROCEDURE — 80048 BASIC METABOLIC PNL TOTAL CA: CPT

## 2025-03-25 PROCEDURE — 36415 COLL VENOUS BLD VENIPUNCTURE: CPT

## 2025-03-25 PROCEDURE — 82805 BLOOD GASES W/O2 SATURATION: CPT

## 2025-03-25 PROCEDURE — 36600 WITHDRAWAL OF ARTERIAL BLOOD: CPT

## 2025-03-25 PROCEDURE — 93005 ELECTROCARDIOGRAM TRACING: CPT

## 2025-03-25 PROCEDURE — 96375 TX/PRO/DX INJ NEW DRUG ADDON: CPT

## 2025-03-25 PROCEDURE — 87077 CULTURE AEROBIC IDENTIFY: CPT

## 2025-03-25 PROCEDURE — 84443 ASSAY THYROID STIM HORMONE: CPT

## 2025-03-25 PROCEDURE — 83880 ASSAY OF NATRIURETIC PEPTIDE: CPT

## 2025-03-25 PROCEDURE — 87186 SC STD MICRODIL/AGAR DIL: CPT

## 2025-03-25 PROCEDURE — 94640 AIRWAY INHALATION TREATMENT: CPT

## 2025-03-25 PROCEDURE — 94660 CPAP INITIATION&MGMT: CPT

## 2025-03-25 PROCEDURE — 84439 ASSAY OF FREE THYROXINE: CPT

## 2025-03-25 PROCEDURE — 85025 COMPLETE CBC W/AUTO DIFF WBC: CPT

## 2025-03-25 PROCEDURE — 85027 COMPLETE CBC AUTOMATED: CPT

## 2025-03-25 PROCEDURE — 4A033R1 MEASUREMENT OF ARTERIAL SATURATION, PERIPHERAL, PERCUTANEOUS APPROACH: ICD-10-PCS

## 2025-03-25 PROCEDURE — 90732 PPSV23 VACC 2 YRS+ SUBQ/IM: CPT

## 2025-03-25 PROCEDURE — 5A09357 ASSISTANCE WITH RESPIRATORY VENTILATION, LESS THAN 24 CONSECUTIVE HOURS, CONTINUOUS POSITIVE AIRWAY PRESSURE: ICD-10-PCS

## 2025-03-25 PROCEDURE — 87184 SC STD DISK METHOD PER PLATE: CPT

## 2025-03-25 PROCEDURE — 94760 N-INVAS EAR/PLS OXIMETRY 1: CPT

## 2025-03-25 PROCEDURE — 84484 ASSAY OF TROPONIN QUANT: CPT

## 2025-03-25 PROCEDURE — 96365 THER/PROPH/DIAG IV INF INIT: CPT

## 2025-03-25 PROCEDURE — 80076 HEPATIC FUNCTION PANEL: CPT

## 2025-03-25 PROCEDURE — 87205 SMEAR GRAM STAIN: CPT

## 2025-03-25 PROCEDURE — 93306 TTE W/DOPPLER COMPLETE: CPT

## 2025-03-25 PROCEDURE — 71045 X-RAY EXAM CHEST 1 VIEW: CPT

## 2025-03-25 PROCEDURE — 99285 EMERGENCY DEPT VISIT HI MDM: CPT

## 2025-03-25 PROCEDURE — 90471 IMMUNIZATION ADMIN: CPT

## 2025-03-25 PROCEDURE — 83735 ASSAY OF MAGNESIUM: CPT

## 2025-03-25 RX ADMIN — MAGNESIUM SULFATE IN WATER ONE MLS: 40 INJECTION, SOLUTION INTRAVENOUS at 15:46

## 2025-03-25 RX ADMIN — ATORVASTATIN CALCIUM SCH MG: 40 TABLET, FILM COATED ORAL at 20:41

## 2025-03-25 RX ADMIN — NICOTINE SCH MG: 21 PATCH TRANSDERMAL at 15:00

## 2025-03-25 RX ADMIN — ALBUTEROL SULFATE PRN MG: 2.5 SOLUTION RESPIRATORY (INHALATION) at 21:39

## 2025-03-25 RX ADMIN — IPRATROPIUM BROMIDE PRN MG: 0.5 SOLUTION RESPIRATORY (INHALATION) at 21:39

## 2025-03-25 NOTE — P.HP
Certification for Inpatient


Patient admitted to: Inpatient


With expected LOS: >2 Midnights


Patient will require the following post-hospital care: None


Practitioner: I am a practitioner with admitting privileges, knowledge of 

patient current condition, hospital course, and medical plan of care.


Services: Services provided to patient in accordance with Admission requirements

found in Title 42 Section 412.3 of the Code of Federal Regulations





Patient History


Date of Service: 03/25/25


Reason for admission: COPD exacerbation, new onset CHF


History of Present Illness: 





69-year-old male with history of COPD on chronic home O2, hypertension, 

hyperlipidemia, tobacco use disorder, CAD with recent stents presents to the 

emergency department chief complaint of shortness of breath, lower extremity 

edema.  About 1 month ago he had 4 stents placed, he has been compliant with his

medications including Effient, aspirin, statin.  He reports last few days he has

noticed increasing swelling in his lower extremities as well as worsening 

trouble with his breathing.  He also reports increase sputum production and 

wheezing.





Patient was evaluated in the emergency department chest x-ray was performed 

which showed no acute findings labs were significant for sodium 134 bicarb 35 

 troponin 23.1 white blood count 11.7.  Patient has 3+ pitting edema of 

his lower extremities currently he was given IV Lasix, steroids, nebs in the ED 

and was still having some dyspnea, he was started on BiPAP for work of breathing

in ED.  He will be admitted for further evaluation and management of suspected 

new onset CHF with possible COPD exacerbation.


Allergies





No Known Drug Allergies Allergy (Verified 04/14/18 01:40)


   Unknown


No Known Allergies Allergy (Uncoded 04/14/18 01:10)


   Unknown





Home Medications: 








Ipratropium/Albuterol Sulfate [Combivent Respimat  Mcg] 1 puff IH BID 

04/14/18 


predniSONE [Deltasone*] 10 mg PO BID 7 Days #14 tab 02/11/25 


Fluticasone/Umeclidin/Vilanter [Trelegy Ellipta 100-62.5-25] 1 each IH DAILY 

02/24/25 








- Past Medical/Surgical History


Diabetic: No


-: COPD


-: tobacco abuse


-: HTN


-: dyslipidemia


-: TUBERCULOSIS IN 1980'S


-: CAD


Psychosocial/ Personal History: Lives at home with family





- Family History


  ** Mother


-: Heart disease


Notes: had pacemaker





- Social History


Smoking Status: Current every day smoker


Counseled patient to stop smoking for: less than 10 minutes


Alcohol use: No


CD- Drugs: Yes


Caffeine use: Yes





Review of Systems


10-point ROS is otherwise unremarkable


Respiratory: Cough, Shortness of Breath, SOB with Excertion, Sputum, Wheezing





Physical Examination





- Vital Signs


Temperature: 97.9 F


Blood Pressure: 117/63


Pulse: 104


Respirations: 20


Pulse Ox (%): 94





- Physical Exam


General: Alert, In no apparent distress, Oriented x3


HEENT: Atraumatic, PERRLA, EOMI


Neck: Supple, 2+ carotid pulse no bruit, Without JVD or thyroid abnormality


Respiratory: Diminished, Expiratory wheezes


Cardiovascular: Regular rate/rhythm, Normal S1 S2


Gastrointestinal: Normal bowel sounds, No tenderness


Musculoskeletal: No tenderness


Integumentary: No rashes


Neurological: Normal speech, Normal strength at 5/5 x4 extr, Normal affect





- Studies


Laboratory Data (last 24 hrs)











  03/25/25 03/25/25





  13:15 12:40


 


WBC   11.70 H


 


Hgb   13.8


 


Hct   41.2


 


Plt Count   242


 


Sodium  134 L 


 


Potassium  3.9 


 


BUN  12 


 


Creatinine  0.86 


 


Glucose  107 H 


 


Total Bilirubin  0.6 


 


AST  20 


 


ALT  44 


 


Alkaline Phosphatase  84 











Assessment and Plan





- Plan


Assessment:


Acute on chronic hypoxic respiratory failure


COPD with exacerbation-on chronic home O2


Suspected new onset CHFbilateral lower extremity pitting edema


CAD with recent stents x 4


Hypertension


Hyperlipidemia


Mild hyponatremia








Plan:


Acute on chronic hypoxic respiratory failure


COPD with exacerbation-on chronic home O2


Suspected new onset CHFbilateral lower extremity pitting edema


CAD with recent stents x 4


Cardiology and pulmonology consulted


Continue diuresis with IV Lasix


Continue steroids, as needed nebulizer treatments


BiPAP as needed


ABG without respiratory acidosis


Effient, aspirin, statin continued


echocardiogram ordered








Hypertension


Hyperlipidemia


Continue home medications





Mild hyponatremia


Continue diuresis for suspected CHF exacerbation





DVT PPX: Lovenox


Code status: Full


Discharge Plan: Home


Plan to discharge in: Greater than 2 days





- Advance Directives


Does patient have a Living Will: No


Does patient have a Durable POA for Healthcare: No





- Code Status/Comfort Care


Code Status Assessed: Yes (Full code)


Critical Care: No


Time Spent Managing Pts Care (In Minutes): 65

## 2025-03-25 NOTE — XMS REPORT
Continuity of Care Document



                           Created on: 2025





RIAZ EPIFANIO

External Reference #: [id number]

: 1955

Sex: Male



Demographics





                                        Address             [address] [place]  [address]

 

                                        Home Phone          [phone]

 

                                        Work Phone          [phone]

 

                                        Mobile Phone        [phone]

 

                                        Email Address       [email or web address]

[facility]

 

                                        Preferred Language  English

 

                                        Marital Status      Unknown

 

                                        Confucianism Affiliation Unknown

 

                                        Race                Unknown

 

                                        Additional Race(s)  White

 

                                        Ethnic Group        Unknown





Author





                                        Name                Unknown

 

                                        Address             [address]

[place]  [address]

 

                                        [facility]ization        [facility]

 

                                        Address             [address]

[address]

[place]  [address]

 

                                        Phone               [phone]





Support





                          Name         Relationship Address      Phone

 

                          MARY QUINONES            Unknown      [phone]

 

                          Axel Martinez        Unknown      +



 

                          AXEL QUINONES            Unknown      Unavaila

yakov Owen Helder  Roommate     Unknown      [phone]

 

                                SACKS, HELDER     OT              [address] [place], [place]  [address] [phone]





Care Team Providers





                                Care Team Member Name Role            Phone

 

                                Donna Paige    Primary Care Physician [phone]

[phone]

 

                                Bong Corey Attending Clinician Unavailamber anthony

 

                                Doctor Unassigned, No Name Attending Clinician U

MARLENY Rivera Attending Clinician Unavail

Marleny Whatley MD Attending Clinician [phone]

[phone]

 

                                Maria Luisa BROWN, Scar PAN Attending Clinician Unavail

able

 

                                JAH BUSH  Attending Clinician Unavailable

 

                                Beata De La Cruz DO Attending Clinician [phone]

 

                                Jah Bush MD Attending Clinician [phone]

 

                                Andre REYES, Emanuel PAN Attending Clinician [phone]

 

                                Bong Corey Admitting Clinician MARLENY Moreland Admitting Clinician Unavail

able

 

                                JAH BUSH  Admitting Clinician Mary Bush MD, Jah Admitting Clinician [phone]

 

                                Andre REYES, Emanuel PAN Admitting Clinician [phone]







Payers





                    Payer Name Policy Type Policy Number Effective Date Expirati

on Date Source







Problems





                                                    Condition 

Name                                    Condition 

Details                                 Condition 

Category                  Status                    Onset 

Date                                    Resolution 

Date                                    Last 

Treatment 

Date                                    Treating 

Clinician                 Comments                  Source

 

                                                    SOB 

(shortness 

of breath)                              SOB 

(shortness 

of breath)          Disease             Active              2022

0- 

00:00:

00                                                               [facility]







Allergies, Adverse Reactions, Alerts





                                                    Allergy 

Name                                    Allergy 

Type            Status          Severity        Reaction(s)     Onset 

Date                                    Inactive 

Date                                    Treating 

Clinician                 Comments                  Source

 

                                                    No Known 

Allergie

s            DA           Active       U                         

2 

00:00:

00                                                              [facility] 

[facility]

 

                                                    NO KNOWN 

ALLERGIE

S                                       Drug 

Class   Active                                                  [facility]







Social History





                    Social Habit Start Date Stop Date Quantity  Comments  Source

 

                                                    History of 

tobacco use                                                 Occasional 

tobacco smoker                                      [facility]

 

                                                    Exposure to 

SARS-CoV-2 

(event)                                 2023 

00:00:00                                2023 

18:14:00            Not sure                                [facility]

 

                                        Alcohol intake      2023 

00:00:00                                2023 

00:00:00                                Current drinker 

of alcohol 

(finding)                                           [facility]

 

                                                    History SDOH 

Transport Med                           2022 

00:00:00                                2022 

00:00:00            2                                       [facility]

 

                                                    History SDOH 

Transport Non-Med                       2022 

00:00:00                                2022 

00:00:00            2                                       [facility]

 

                                                    Tobacco use and 

exposure                                2019 

00:00:00                                2019 

00:00:00                                Smokeless tobacco 

non-user                                            [facility]

 

                                                    Sex Assigned At 

Birth                                   1955 

00:00:00                                1955 

00:00:00                                                    [facility]







                          Smoking Status Start Date   Stop Date    Source

 

                          Occasional tobacco smoker 2019 00:00:00         

     [facility]







Medications





                                                    Ordered 

Medication 

Name                                    Filled 

Medication 

Name                                    Start 

Date                                    Stop 

Date                                    Current 

Medication?                             Ordering 

Clinician       Indication      Dosage          Frequency       Signature 

(SIG)               Comments            Components          Source

 

                                                    doxycycline 

hyclate 

(Vibramycin

) capsule 

100 mg                                              

3- 

03:00:

00                                       

02:57

:00        No                               100mg                 100 mg, 

Oral, 

ONCE, 1 

dose, On 

Sat 3/4/23 

at 2100, 

ASAP<br>Re

ason for 

Anti-Infec

tive: 

Empiric 

Therapy 

for 

Suspected 

Infection<

br>Empiric 

Therapy 

Site: 

Respirator

y<br>Durat

ion of 

therapy: 

72 hours                                                    [facility]

 

                                                    ipratropium

-albuteroL 

(DUONEB) 

0.5 mg-3 

mg(2.5 mg 

base)/3 mL 

nebulizer 

solution 3 

mL                                                  2023-0

3-05 

01:15:

00                                       

01:29

:00        No                               3mL                   3 mL, 

Inhalation

, ONCE, 1 

dose, On 

Sat 3/4/23 

at 191[facility]

 

                                                    cloNIDine 

(CATAPRES) 

tablet 0.2 

mg                                                  2023-0

3-05 

01:15:

00                                       

01:23

:00        No                               .2mg                  0.2 mg, 

Oral, 

ONCE, 1 

dose, On 

Sat 3/4/23 

at 191, 

STAT                                                        [facility]

 

                                                    methylpredn

isolone sod 

succ 

(SOLU-MEDRO

L) 

injection 

125 mg                                              2023-0

3-05 

01:15:

00                                       

02:04

:00        No                               125mg                 125 mg, 

Intravenou

s, ONCE, 1 

dose, On 

Sat 3/4/23 

at 191, 2 

mL                                                          [facility]

 

                                                    furosemide 

(LASIX) 

injection 

40 mg                                               2023-0

3-05 

00:30:

00                                       

02:03

:00        No                               40mg                  40 mg, IV 

Push, 

ONCE, 1 

dose, On 

Sat 3/4/23 

at 1830, 

[facility]

 

                                                    ipratropium

-albuteroL 

(DUONEB) 

0.5 mg-3 

mg(2.5 mg 

base)/3 mL 

nebulizer 

solution 3 

mL                                                  2023-0

3-05 

00:30:

00                                       

00:36

:00        No                               3mL                   3 mL, 

Inhalation

, ONCE, 1 

dose, On 

Sat 3/4/23 

at 1830, 

[facility]

 

                                                    doxycycline 

hyclate 100 

mg capsule                                          2023-0

3-04 

00:00:

00                  Yes                 [id number] 100mg               Take 1 

capsule by 

mouth in 

the 

morning 

and 1 

capsule in 

the 

evening.                                                    [facility]

 

                                                    hydrOXYzine 

10 mg 

tablet                                              2023-0

3-04 

00:00:

00                  Yes                 [id number] 10mg                Take 1 

tablet by 

mouth 

every 8 

(eight) 

hours as 

needed for 

Itching 

for up to 

10 doses.                                                   [facility]

 

                                                    amLODIPine 

5 mg tablet                                         2023-0

3-04 

00:00:

00                                       

04:59

:00        No                    [id number]   5mg                   Take 1 

tablet by 

mouth in 

the 

morning 

for 30 

doses.                                                      [facility]

 

                                                    hydrocortis

one 1 % 

ointment                                            2023-0

3-04 

00:00:

00                                      2023-

03-15 

04:59

:00        No                    [id number]                        Apply to 

affected 

area(s) 2 

(two) 

times 

daily as 

needed for 

Itching or 

Dermatitis

/Rash for 

up to 10 

days.                                                       [facility]

 

                                                    predniSONE 

20 mg 

tablet                                              2023-0

3-04 

00:00:

00                                      2023-

03-10 

05:59

:00        No                    [id number]  20mg                  Take 1 

tablet by 

mouth in 

the 

morning 

and 1 

tablet in 

the 

evening. 

Do all 

this for 5 

days.                                                       [facility]

 

                                                    hydroCHLORO

thiazide 25 

mg tablet                                           2022 

00:00:

00                                       

05:59

:00        No                    [id number]  25mg                  Take 1 

tablet by 

mouth in 

the 

morning 

for 30 

days.                                                       [facility]

 

                                                    predniSONE 

20 mg 

tablet                                              2022 

00:00:

00                                       

04:59

:00        No                    [id number]  40mg                  Take 2 

tablets by 

mouth in 

the 

morning 

for 3 

days.                                                       [facility]

 

                                                    ipratropium

/albuterol 

sulfate 

(COMBIVENT 

INHALE)                                             2022 

15:45:

43            Yes                                Inhale.               [facility]

 

                                                    budesonide/

formoterol 

fumarate 

(SYMBICORT 

INHALE)                                             2022 

14:43:

19                                      2022-

10-31 

00:00

:00     No                                      Inhale.                 [facility]

 

                                                    amLODIPine 

5 mg tablet                                         2022 

14:43:

19                                      2022-

10-31 

00:00

:00        No                               5mg                   Take 5 mg 

by mouth 

in the 

morning.                                                    [facility]

 

                                                    dextrometho

rphan-samuelf

enesin 

 mg/5 

mL solution                                         2022 

00:00:

00                  Yes                 [id number] 10mL                Take 10 mL

 

by mouth 

every 6 

(six) 

hours as 

needed for 

Cough.                                                      [facility]

 

                                                    albuterol 

90 

mcg/actuati

on inhaler                                          2022 

00:00:

00                  Yes                 [id number] 2{puff}             Inhale 2 

Puffs 

every 4 

(four) 

hours as 

needed for 

Wheezing 

or 

Shortness 

of Breath.                                                  [facility]

 

                                                    hydrocortis

one 1 % 

ointment                                            2022 

00:00:

00                                       

00:00

:00        No                    [id number]                        Apply to 

affected 

area(s) 2 

(two) 

times 

daily as 

needed for 

Itching or 

Dermatitis

/Rash.                                                      [facility]

 

                                                    hydrOXYzine 

10 mg 

tablet                                              2022 

00:00:

00                                       

00:00

:00        No                    [id number]  10mg                  Take 1 

tablet by 

mouth 

every 8 

(eight) 

hours as 

needed for 

Itching.                                                    [facility]

 

                                                    lisinopriL 

5 mg tablet                                         2022 

00:00:

00                                       

05:59

:00        No                    [id number]  5mg                   Take 1 

tablet by 

mouth in 

the 

morning 

for 30 

days.                                                       [facility]

 

                                                    levoFLOXaci

n 750 mg 

tablet                                              2022 

00:00:

00                                       

04:59

:00        No                    [id number]  750mg                 Take 1 

tablet by 

mouth in 

the 

morning 

for 4 

days.                                                       [facility]

 

                                                    sodium 

chloride 7% 

(HYPER-SAL) 

nebulizer 

solution 4 

mL                                                  2022 

17:45:

00                  Yes                           4mL                 4 mL, 

Inhalation

, DAILY, 

First dose 

on Sun 

10/30/22 

at 1245, 

Until 

Discontinu

ed, 

Routine                                                     [facility]

 

                                                    predniSONE 

(DELTASONE) 

tablet 40 

mg                                                  2022 

14:00:

00                                       

13:59

:00        No                               40mg                  40 mg, 

Oral, 

DAILY, 5 

doses, 

First dose 

on Sun 

10/30/22 

at 0900, 

Last dose 

on Thu 

11/3/22 at 

0900, 

Routine                                                     [facility]

 

                                                    hydrOXYzine 

(ATARAX) 

tablet 10 

mg                                                  2022 

12:32:

22                  Yes                           10mg                10 mg, 

Oral, 

Q8HPRN, 

Starting 

on Sun 

10/30/22 

at 0732, 

Until 

Discontinu

ed, 

Routine, 

Itching                                                     [facility]

 

                                                    ipratropium

-albuteroL 

(DUONEB) 

0.5 mg-3 

mg(2.5 mg 

base)/3 mL 

nebulizer 

solution 3 

mL                                                  2022 

01:00:

00                  Yes                           3mL                 3 mL, 

Inhalation

, Q4H, 

First dose 

(after 

last 

modificati

on) on Sat 

10/29/22 

at 2000, 

Until 

Discontinu

ed, 

Routine                                                     [facility]

 

                                                    enoxaparin 

(LOVENOX) 

injection 

40 mg                                               2022 

22:00:

00                  Yes                           40mg                40 mg, 

Subcutaneo

us, DAILY, 

First dose 

on Sat 

10/29/22 

at 1700, 

Until 

Discontinu

ed, 

Routine                                                     [facility]

 

                                                    nicotine 

(NICODERM) 

21 mg/24 hr 

patch 1 

Patch                                               2022 

21:30:

00                        Yes                                    1{patch

}                                                   1 Patch, 

Topical, 

Administer 

over 24 

Hours, 

Q24H, 

First dose 

on Sat 

10/29/22 

at 1630, 

Until 

Discontinu

ed, 

Routine                                                     [facility]

 

                                                    hydroCHLORO

thiazide 

(ESIDRIX) 

tablet 25 

mg                                                  2022 

20:30:

00                  Yes                           25mg                25 mg, 

Oral, 

DAILY, 

First dose 

on Sat 

10/29/22 

at 1530, 

Until 

Discontinu

ed, 

Routine                                                     [facility]

 

                                                    amLODIPine 

(NORVASC) 

tablet 10 

mg                                                  2022 

20:30:

00                  Yes                           10mg                10 mg, 

Oral, 

DAILY, 

First dose 

on Sat 

10/29/22 

at 1530, 

Until 

Discontinu

ed, 

Routine                                                     [facility]

 

                                                    hydrocortis

one 

(CORTIZONE-

10) 1 % 

ointment                                            2022 

20:30:

00                  Yes                                               Topical 

(Apply To 

Affected 

Areas), 

BID, First 

dose on 

Sat 

10/29/22 

at 1530, 

Until 

Discontinu

ed, 

Routine                                                     [facility]

 

                                                    levoFLOXaci

n 

(LEVAQUIN) 

tablet 750 

mg                                                  2022 

17:00:

00                                       

16:59

:00        No                               750mg                 750 mg, 

Oral, Q24H 

ABX, 7 

doses, 

First dose 

on Sat 

10/29/22 

at 1200, 

Last dose 

on 22 at 

1200, 

ASAP<br>Re

ason for 

Anti-Infec

tive: 

Documented 

Infection<

br>Documen

balbir 

Infection 

Site: 

Respirator

y<br>Durat

ion of 

Therapy: 7 

days<br>Re

stricted 

use 

approved 

by: [facility] 

PROVIDER                                                    [facility]

 

                                                    ipratropium

-albuteroL 

(DUONEB) 

0.5 mg-3 

mg(2.5 mg 

base)/3 mL 

nebulizer 

solution 3 

mL                                                  2022 

17:00:

00                                      2022-

10-29 

20:51

:01        No                               3mL                   3 mL, 

Inhalation

, Q6H, 

First dose 

on Sat 

10/29/22 

at 1200, 

Until 

Discontinu

ed, 

Routine                                                     [facility]

 

                                                    dextrometho

rphan-guaif

enesin 

(ROBITUSSIN 

DM)  

mg/5 mL 

solution 10 

mL                                                  2022 

16:58:

57                  Yes                           10mL                10 mL, 

Oral, 

Q6HPRN, 

Starting 

on Sat 

10/29/22 

at 1158, 

Until 

Discontinu

ed, 

Routine, 

Cough                                                       [facility]

 

                                                    ondansetron 

(ZOFRAN 

(PF)) 

injection 4 

mg                                                  2022 

16:57:

41                  Yes                           4mg                 4 mg, Slow

 

IV Push, 

Q6HPRN, 

Starting 

on Sat 

10/29/22 

at 1157, 

Until 

Discontinu

ed, 

Routine, 

Nausea and 

Vomiting 

(N/V)                                                       [facility]

 

                                                    acetaminoph

en 

(TYLENOL) 

tablet 650 

mg                                                  2022 

16:57:

29                  Yes                           650mg               650 mg, 

Oral, 

Q6HPRN, 

Starting 

on Sat 

10/29/22 

at 1157, 

Until 

Discontinu

ed, 

Routine, 

Pain 

(scale 

1-3), Temp 

> 38.5 C                                                    [facility]

 

                                                    albuterol 

(PROVENTIL) 

2.5 mg /3 

mL (0.083 

%) 

nebulizer 

solution 5 

mg                                                  2022 

16:15:

00                                      2022-

10-29 

15:19

:00        No                               5mg                   5 mg, 

Inhalation

, ONCE, 1 

dose, On 

Sat 

10/29/22 

at 1115, 

ASAP                                                        [facility]

 

                                                    magnesium 

sulfate in 

water 2 

gram/50 mL 

(4 %) 

infusion 2 

g                                                   2022 

16:15:

00                                      2022-

10-29 

16:20

:00        No                               2g                    2 g, IV 

Piggyback, 

Administer 

over 60 

Minutes, 

ONCE, 1 

dose, On 

Sat 

10/29/22 

at 1115, 

Routine                                                     [facility]

 

                                                    ipratropium 

(ATROVENT) 

0.02 % 

nebulizer 

solution 

0.5 mg                                              2022 

15:00:

00                                      2022-

10-29 

14:13

:00        No                               .5mg                  0.5 mg, 

Inhalation

, ONCE, 1 

dose, On 

Sat 

10/29/22 

at 1000, 

ASAP                                                        [facility]

 

                                                    methylpredn

isolone sod 

succ 

(SOLU-MEDRO

L) 

injection 

125 mg                                              2022 

15:00:

00                                      2022-

10-29 

14:17

:00        No                               125mg                 125 mg, 

Slow IV 

Push, ONCE 

NOW, 1 

dose, On 

Sat 

10/29/22 

at 1000, 

ASAP                                                        [facility]

 

                                                    albuterol 

(PROVENTIL) 

2.5 mg /3 

mL (0.083 

%) 

nebulizer 

solution 5 

mg                                                  2022 

15:00:

00                                      2022-

10-29 

14:13

:00        No                               5mg                   5 mg, 

Inhalation

, ONCE, 1 

dose, On 

Sat 

10/29/22 

at 1000, 

ASAP                                                        [facility]

 

                                                    budesonide/

formoterol 

fumarate 

(SYMBICORT 

INHALE)                                             0

 

20:20:

01            Yes                                Inhale.               [facility]

 

                                                    ipratropium

/albuterol 

sulfate 

(COMBIVENT 

INHALE)                                             0

 

20:20:

01            Yes                                Inhale.               [facility]

 

                                                    fluticasone 

propion/sal

meterol 

(ADVAIR 

DISKUS 

INHALE)                                             0

 

20:20:

01            Yes                                Inhale.               [facility]

 

                                                    ceFAZolin 

(ANCEF) 

injection                                           0

 

19:30:

00                  Yes                                               PRN, 

Starting 

19 at 

1430, 

Until 

Discontinu

ed, ASAP, 

Intra-op                                                    [facility]

 

                                                    carbachol 

(MIOSTAT) 

0.01 % 

intraocular 

injection                                            

19:29:

00                  Yes                                               PRN, 

Starting 

19 at 

1429, 

Until 

Discontinu

ed, 

Routine, 

Intra-op                                                    [facility]

 

                                                    balanced 

salt irrig 

soln comb1 

(BSS PLUS) 

ophthalmic 

solution 

500 mL bag                                           

19:16:

00                  Yes                                               PRN, 

Starting 

19 at 

1416, 

Until 

Discontinu

ed, 

Routine, 

Intra-op                                                    [facility]

 

                                                    bupivacaine 

(preserv 

free) 

(SENSORCAIN

E MPF) 0.75 

% (7.5 

mg/mL) 

injection                                            

19:07:

00                  Yes                                               PRN, 

Starting 

19 at 

1407, 

Until 

Discontinu

ed, 

Routine, 

Intra-op                                                    [facility]

 

                                                    lactated 

ringers IV 

infusion 

500 mL                                               

16:15:

00                                       

16:32

:00        No                               500mL                 at 20 

mL/hr, 500 

mL, IV 

Infusion, 

ONCE, 1 

dose, 19 at 

1115, 

Routine, 

DSU Pre-op                                                  [facility]

 

                                                    budesonide/

formoterol 

fumarate 

(SYMBICORT 

INHALE)                                              

15:20:

01            Yes                                Inhale.               [facility]

 

                                                    budesonide/

formoterol 

fumarate 

(SYMBICORT 

INHALE)                                              

13:41:

21            Yes                                Inhale.               [facility]

 

                                                    ipratropium

/albuterol 

sulfate 

(COMBIVENT 

INHALE)                                              

13:41:

21            Yes                                Inhale.               [facility]

 

                                                    fluticasone 

propion/sal

meterol 

(ADVAIR 

DISKUS 

INHALE)                                              

13:41:

21            Yes                                Inhale.               [facility]







Vital Signs





                      Vital Name Observation Time Observation Value Comments   S

ourchino

 

                                                    Systolic blood 

pressure        2023 02:30:00 134 mm[Hg]                      [facility]

 

                                                    Diastolic blood 

pressure        2023 02:30:00 86 mm[Hg]                       [facility]

 

                      Heart rate 2023 02:30:00 100 /min              [facility]

 

                      Respiratory rate 2023 02:30:00 31 /min              

 [facility]

 

                                                    Oxygen saturation in 

Arterial blood by 

Pulse oximetry  2023 02:30:00 93 /min                         [facility]

 

                      Body temperature 2023 00:15:00 36.78 Agnieszka            

 [facility]

 

                      Body height 2023 00:15:00 172.7 cm              Univ

[facility]

 

                      Body weight 2023 00:15:00 63.504 kg             [facility]

 

                      BMI        2023 00:15:00 21.29 kg/m2            [facility]

 

                      Respiratory rate 2022-10-31 17:00:00 16 /min              

 [facility]

 

                                                    Oxygen saturation in 

Arterial blood by 

Pulse oximetry  2022-10-31 17:00:00 92 /min                         [facility]

 

                      Heart rate 2022-10-31 16:30:00 99 /min               Unive

[facility]

 

                      Body temperature 2022-10-31 16:30:00 36.39 Agnieszka            

 [facility]

 

                                                    Systolic blood 

pressure        2022-10-31 16:30:00 145 mm[Hg]                      [facility]

 

                                                    Diastolic blood 

pressure        2022-10-31 16:30:00 93 mm[Hg]                       [facility]

 

                      Body weight 2022-10-31 09:00:00 66.996 kg             [facility]

 

                      BMI        2022-10-31 09:00:00 22.46 kg/m2            [facility]

 

                      Body height 2022-10-29 14:02:00 172.7 cm              [facility]

 

                                                    Systolic blood 

pressure        2019 19:42:00 156 mm[Hg]                      [facility]

 

                                                    Diastolic blood 

pressure        2019 19:42:00 96 mm[Hg]                       [facility]

 

                      Body temperature 2019 19:42:00 36.78 Agnieszka            

 [facility]

 

                      Respiratory rate 2019 19:42:00 18 /min              

 [facility]

 

                                                    Oxygen saturation in 

Arterial blood by 

Pulse oximetry  2019 19:42:00 100 /min                        [facility]

 

                      Heart rate 2019 16:23:00 73 /min               [facility]

[facility]

 

                      Body height 2019 18:12:00 172.7 cm              [facility]

 

                      Body weight 2019 18:12:00 65.772 kg             [facility]

 

                      BMI        2019 18:12:00 22.05 kg/m2            [facility]







Procedures





                                        Procedure           Date / Time 

Performed                 Performing Clinician      Source

 

                                                    DILATION OF 2 COR ART WITH 

4 DRUG-ELUT, PERC APPRO 2025 00:00:00 [facility]

 

                                                    INSERT EXT HEART ASSIST IN 

HEART, INTRAOP, PERC 2025 00:00:00 [facility]

 

                                                    MEASURE OF CARDIAC SAMPL 

PRESSURE, L HEART, PERC 2025 00:00:00 [facility]

 

                                                    ASSIST WITH CARDIAC OUTPUT 

USING IMPELLER PUMP, CO 2025 00:00:00 [facility]

 

                                                    FLUOROSCOPY OF MULT COR 

ART USING L OSM CONTRAST 2025 00:00:00 [facility]

 

                                                    ULTRASONOGRAPHY OF 

MULTIPLE CORONARY 

ARTERIES, INT       2025 00:00:00 [facility]

 

                                MEDICATION CORRESPONDENCE 2023 05:01:00 Do

ctor Unassigned, 

No Name                                 [facility]

 

                          EKG-12 LEAD  2023 02:53:59 Marleny Stauffer 

[facility]

 

                                                    COMP. METABOLIC PANEL 

([id number])             2023 02:00:00 Marleny Stauffer [facility]

 

                                                    RAPID STREP SCREEN FOR 

GROUP A             2023 01:23:00 Marleny Stauffer [facility]

 

                          RAPID [id number] A/B 2023 01:23:00 Marleny Stauffer [facility]

 

                          XR CHEST 2 VW 2023 01:21:24 Marleny Stauffer

 [facility]

 

                          TROPONIN I   2023 00:59:00 Marleny Stauffer 

[facility]

 

                          CBC WITH DIFF 2023 00:59:00 Marleny Stauffer

 [facility]

 

                          N-TERMINAL PRO-BNP 2023 00:59:00 Marleny Stauffer [facility]

 

                                                    COVID-19 (ID NOW RAPID 

TESTING)            2023 00:59:00 Marleny Stauffer [facility]

 

                                                    CONSENT/REFUSAL FOR 

DIAGNOSIS AND TREATMENT   2023 00:03:12       Doctor Unassigned, 

No Name                                 [facility]

 

                                                    AUTHORIZATION FOR RELEASE 

OF PHI                    2023 06:01:00       Doctor Unassigned, 

No Name                                 [facility]

 

                                                    EXTERNAL PROVIDER - ADC 

REFERRAL                  2022 06:01:00       Doctor Unassigned, 

No Name                                 [facility]

 

                          MAGNESIUM    2022-10-30 08:47:00 Jah Bush

[facility]

 

                                                    BASIC METABOLIC PANEL (NA, 

K, CL, CO2, GLUCOSE, BUN, 

CREATININE, CA)     2022-10-30 08:47:00 Jah Bush      [facility]

 

                          CBC WITH DIFF 2022-10-30 08:47:00 [facility]

[facility]

 

                                                    LEGIONELLA URINARY ANTIGEN 

TST                 [facility]10[facility]29 [facility]41[facility]

 

                          SPUTUM CULTURE 2022-10-29 21:41:00 Rachelle [facility]

 

                          PNEUMOCOCCAL ANTIGEN 2022-10-29 21:40:00 Kelechi Bush i [facility]

 

                          PROCALCITONIN 2022-10-29 18:08:00 Jah Bush [facility]

 

                                                    MRSA / MSSA SCREEN BY PCRMICHELE               2022-10-29 18:08:00 Jah Bush      [facility]

 

                          RESPIRATORY PANEL BY PCR 2022-10-29 18:08:00 ELIOT Bush [facility]

 

                          XR CHEST 1 VW 2022-10-29 14:30:00 Beata De La Cruz

[facility]

 

                                                    HB ECG ROUTINE & RHYTHM 

STRIP               2022-10-29 14:15:12 Beata De La Cruz  [facility]

 

                          TROPONIN I   2022-10-29 14:09:00 Beata De La Cruz Un

[facility]

 

                                                    COMP. METABOLIC PANEL 

([id number])             2022-10-29 14:09:00 Beata De La Cruz  [facility]

 

                          CBC WITH DIFF 2022-10-29 14:09:00 Beata De La Cruz

[facility]

 

                                                    COVID-19 (ID NOW RAPID 

TESTING)            2022-10-29 14:09:00 Beata De La Cruz  [facility]

 

                                                    NOTICE OF PRIVACY 

PRACTICES                 2022-10-29 13:48:55       Doctor Unassigned, 

No Name                                 [facility]

 

                                                    CONSENT/REFUSAL FOR 

DIAGNOSIS AND TREATMENT   2022-10-29 13:48:32       Doctor Unassigned, 

No Name                                 [facility]

 

                                ASSIGNMENT OF BENEFITS 2019 14:02:27 Docto

r Unassigned, 

No Name                                 [facility]







Encounters





                                                    Start 

Date/Time                               End 

Date/Time                               Encounter 

Type                                    Admission 

Type                                    Attending 

[facility] 

Facility                                Care 

Department                              Encounter 

ID                                      Source

 

                                                    2025 

16:22:00                                2025 

10:12:00            Inpatient           UR                  Bong Corey              [facility]               [id number]             [id number]

[id number]                                      [facility] 

[facility]

 

                                                    2023 

00:00:00                                2023 

00:00:00                                Orders 

Only                                                Doctor 

Unassigned, 

No Name                                 [facility]                                [id number]

[id number]

[id number]

[phone]

009                       [id number]                 [facility]

 

                                                    2023 

18:17:00                                2023 

21:16:00            Emergency           X                   MARLENY STAUFFER           [facility]            ERT             [id number]      [facility]

 

                                                    2023 

18:17:00                                2023 

21:16:00            Emergency                               Marleny Stauffer                                  [facility]                                  [id number]

[id number]

[id number]

[phone]

[phone]                       [id number]                 [facility]

 

                                                    2023 

00:00:00                                2023 

00:00:00                                Orders 

Only                                                Doctor 

Unassigned, 

No Name                                 [facility]                                [id number]

[id number]

[id number]

[phone]

009                       [id number]                 [facility]

 

                                                    2022 

00:00:00                                2022 

00:00:00                                Orders 

Only                                                Doctor 

Unassigned, 

No Name                                 [facility]                                [id number]

[id number]

[id number]

[phone]

009                       [id number]                  [facility]

 

                                                    2022 

00:00:00                                2022 

00:00:00                                Transition 

of Care                                             Santana Glassele VIVIANE                               [facility] 

DEVON OVIEDO                                   [id number]

[id number]

[id number]

[phone]

[id number]                       [id number]                  [facility]

 

                                                    2022-10-29 

08:50:00                                2022-10-31 

15:45:00            Inpatient           X                   RACHELLEJAH          [facility]            ZACHARIAH             [id number]      [facility]

 

                                                    2022-10-29 

08:50:00                                2022-10-31 

15:45:00                                Hospital 

Encounter                                           JonoBeata Jelani                                  [facility]                                  [id number]

[id number]

[id number]

[phone]

080                       [id number]                  [facility]

 

                                                    2022-10-29 

00:00:00                                2022-10-29 

00:00:00                                Orders 

Only                                                Doctor 

Unassigned, 

No Name                                 [facility]                                [id number]

[id number]

[id number]

[phone]

009                       [id number]                  [facility]

 

                                                    2019 

11:11:00                                2019 

15:19:00                                Hospital 

Encounter                                           Andre 

Emanuel VIVIANE                               [facility]                                  [id number]

[id number]

[id number]

[phone]

071                       [id number]                  [facility]

 

                                                    2019 

00:00:00                                2019 

00:00:00                                Orders 

Only                                                Doctor 

Unassigned, 

No Name                                 [facility]                                [id number]

[id number]

[id number]

[phone]

009                       [id number]                  [facility]







Results





                    Test Description Test Time Test Comments Results   Result Co

mments Source







                                        

 

                                        

 

                                        

 

                                        

 

                                        

 

                                        

 

                                        

 

                                        

 

                                        

 

                                        

 

                                        

 

                                        





[id number] 08:37:00* 



                      Test Item  Value      Reference Range Interpretation Comme

nts

 

                      MAGNESIUM (test code = MAG) 2.15 mg/dL 1.6-2.6    N       

   





CBC W/AUTO [id number] 07:49:00* 



                      Test Item  Value      Reference Range Interpretation Comme

nts

 

                                                    WHITE BLOOD CELL (test code 

= 

WBC)            7.4 x10 3/uL    4.5-11.0        N               

 

                                                    RED BLOOD CELL (test code = 

RBC)            4.10 x10 6/uL   4.00-5.60       N               

 

                      HEMOGLOBIN (test code = HGB) 12.3 g/dL  12.5-16.9  L      

    

 

                      HEMATOCRIT (test code = HCT) 38.5 %     37.5-50.7  N      

    

 

                                                    MEAN CELL VOLUME (test code 

= 

MCV)            93.9 fL         81.0-99.0       N               

 

                      MEAN CELL HGB (test code = MCH) 30.0 pg    27.0-33.0  N   

       

 

                                                    MEAN CELL HGB CONCETRATION 

(test code = MCHC) 31.9 g/dL       33.0-37.0       L               

 

                                                    RED CELL DISTRIBUTION WIDTH 

CV 

(test code = RDW) 14.3 %          11.5-14.5       N               

 

                                                    RED CELL DISTRIBUTION WIDTH 

SD 

(test code = RDW-SD) 49.5 fL         37.0-54.0       N               

 

                                                    PLATELET COUNT (test code = 

PLT)            143 x10 3/uL    150-400         L               

 

                                                    MEAN PLATELET VOLUME (test c

ode 

= MPV)          10.8 fL         7.0-9.0         H               

 

                      NEUTROPHIL % (test code = NT%) 88.4 %     56.0-77.0  H    

      

 

                                                    IMMATURE GRANULOCYTE % (test

 

code = IG%)     0.8 %           0.0-2.0         N               

 

                      LYMPHOCYTE % (test code = LY%) 7.3 %      14.0-32.0  L    

      

 

                      MONOCYTE % (test code = MO%) 3.4 %      4.8-9.0    L      

    

 

                      EOSINOPHIL % (test code = EO%) 0.0 %      0.3-3.7    L    

      

 

                      BASOPHIL % (test code = BA%) 0.1 %      0.0-2.0    N      

    

 

                                                    NUCLEATED RBC % (test code =

 

NRBC%)          0.0 %           0-0             N               

 

                      NEUTROPHIL # (test code = NT#) 6.56 x10 3/uL 2.0-7.6    N 

         

 

                                                    IMMATURE GRANULOCYTE # (test

 

code = IG#)     0.06 x10 3/uL   0.00-0.03       H               

 

                      LYMPHOCYTE # (test code = LY#) 0.54 x10 3/uL 1.0-3.8    L 

         

 

                      MONOCYTE # (test code = MO#) 0.25 x10 3/uL 0.1-0.8    N   

       

 

                      EOSINOPHIL # (test code = EO#) 0.00 x10 3/uL 0.0-0.2    N 

         

 

                      BASOPHIL # (test code = BA#) 0.01 x10 3/uL 0.0-0.2    N   

       

 

                                                    NUCLEATED RBC # (test code =

 

NRBC#)          0.00 x10 3/uL   0.0-0.1         N               





COAGULATION TIME [id number] 09:48:00* 



                      Test Item  Value      Reference Range Interpretation Comme

nts

 

                                                    COAGULATION TIME 

ACTIVATED (test code = 

ACT)            291 SECONDS                                     Performed by 

certified  

at [facility]





COAGULATION TIME [id number] 09:09:00* 



                      Test Item  Value      Reference Range Interpretation Comme

nts

 

                                                    COAGULATION TIME 

ACTIVATED (test code = 

ACT)            313 SECONDS                                     Performed by 

certified  

at [facility]





COAGULATION TIME [id number] 08:48:00* 



                      Test Item  Value      Reference Range Interpretation Comme

nts

 

                                                    COAGULATION TIME 

ACTIVATED (test code = 

ACT)            324 SECONDS                                     Performed by 

certified  

at [facility]





COAGULATION TIME [id number] 08:16:00* 



                      Test Item  Value      Reference Range Interpretation Comme

nts

 

                                                    COAGULATION TIME 

ACTIVATED (test code = 

ACT)            233 SECONDS                                     Performed by 

certified  

at [facility]





COAGULATION TIME [id number] 08:05:00* 



                      Test Item  Value      Reference Range Interpretation Comme

nts

 

                                                    COAGULATION TIME 

ACTIVATED (test code = 

ACT)            211 SECONDS                                     Performed by 

certified  

at [facility]





BASIC METABOLIC [id number] 07:46:00* 



                      Test Item  Value      Reference Range Interpretation Comme

nts

 

                                                    SODIUM (test code = 

NA)             139 mEq/L       134-147         N               

 

                                                    POTASSIUM (test code 

= K)            4.3 mEq/L       3.4-5.0         N               

 

                                                    CHLORIDE (test code 

= CL)           102 mEq/L       100-108         N               

 

                                                    CARBON DIOXIDE (test 

code = CO2)     32 mEq/l        21-33           N               

 

                                                    ANION GAP (test code 

= GAP)          9               0-20            N               

 

                                                    GLUCOSE (test code = 

GLU)            106 mg/dL                                 

 

                                                    BLOOD UREA NITROGEN 

(test code = BUN) 15 mg/dL        7-25            N               

 

                                                    GLOMERULAR 

FILTRATION RATE 

(test code = GFR) 95.2            70-80           H               The Glomerular

 

Filtration Rate is a 

calculated 

parameterbased on serum 

Creatinine, patient age 

and sex. GFR valuesless 

than 60 mL/min/1.73 

square meters are 

indicative ofChronic 

Kidney Disease. Values 

less than 15 

mL/min/1.73square meters 

indicate Kidney failure. 

The calculation forGFR 

is based on the CKD-EPI 

() calculation. This 

formulais race 

indifferent and is the 

recommended formula for 

GFRby the National 

Kidney Foundation for 

Adults.The GFR will not 

calculate if the sex is 

unknown or if 

thepatient's age is <18 

years.

 

                                                    CREATININE (test 

code = CREAT)   0.8 mg/dL       0.6-1.3         N               

 

                                                    CALCIUM (test code = 

CA)             8.2 mg/dL       8.0-10.5        N               





[id number] 07:46:00* 



                      Test Item  Value      Reference Range Interpretation Comme

nts

 

                      MAGNESIUM (test code = MAG) 1.90 mg/dL 1.6-2.6    N       

   





CBC W/AUTO [id number] 06:50:00* 



                      Test Item  Value      Reference Range Interpretation Comme

nts

 

                                                    WHITE BLOOD CELL (test code 

= 

WBC)            8.3 x10 3/uL    4.5-11.0        N               

 

                                                    RED BLOOD CELL (test code = 

RBC)            4.33 x10 6/uL   4.00-5.60       N               

 

                      HEMOGLOBIN (test code = HGB) 13.0 g/dL  12.5-16.9  N      

    

 

                      HEMATOCRIT (test code = HCT) 40.2 %     37.5-50.7  N      

    

 

                                                    MEAN CELL VOLUME (test code 

= 

MCV)            92.8 fL         81.0-99.0       N               

 

                      MEAN CELL HGB (test code = MCH) 30.0 pg    27.0-33.0  N   

       

 

                                                    MEAN CELL HGB CONCETRATION 

(test code = MCHC) 32.3 g/dL       33.0-37.0       L               

 

                                                    RED CELL DISTRIBUTION WIDTH 

CV 

(test code = RDW) 14.3 %          11.5-14.5       N               

 

                                                    RED CELL DISTRIBUTION WIDTH 

SD 

(test code = RDW-SD) 48.8 fL         37.0-54.0       N               

 

                                                    PLATELET COUNT (test code = 

PLT)            167 x10 3/uL    150-400         N               

 

                                                    MEAN PLATELET VOLUME (test c

ode 

= MPV)          10.4 fL         7.0-9.0         H               

 

                      NEUTROPHIL % (test code = NT%) 73.0 %     56.0-77.0  N    

      

 

                                                    IMMATURE GRANULOCYTE % (test

 

code = IG%)     0.8 %           0.0-2.0         N               

 

                      LYMPHOCYTE % (test code = LY%) 18.2 %     14.0-32.0  N    

      

 

                      MONOCYTE % (test code = MO%) 7.4 %      4.8-9.0    N      

    

 

                      EOSINOPHIL % (test code = EO%) 0.5 %      0.3-3.7    N    

      

 

                      BASOPHIL % (test code = BA%) 0.1 %      0.0-2.0    N      

    

 

                                                    NUCLEATED RBC % (test code =

 

NRBC%)          0.0 %           0-0             N               

 

                      NEUTROPHIL # (test code = NT#) 6.03 x10 3/uL 2.0-7.6    N 

         

 

                                                    IMMATURE GRANULOCYTE # (test

 

code = IG#)     0.07 x10 3/uL   0.00-0.03       H               

 

                      LYMPHOCYTE # (test code = LY#) 1.50 x10 3/uL 1.0-3.8    N 

         

 

                      MONOCYTE # (test code = MO#) 0.61 x10 3/uL 0.1-0.8    N   

       

 

                      EOSINOPHIL # (test code = EO#) 0.04 x10 3/uL 0.0-0.2    N 

         

 

                      BASOPHIL # (test code = BA#) 0.01 x10 3/uL 0.0-0.2    N   

       

 

                                                    NUCLEATED RBC # (test code =

 

NRBC#)          0.00 x10 3/uL   0.0-0.1         N               





PROTHROMBIN [id number] 16:59:00* 



                      Test Item  Value      Reference Range Interpretation Comme

nts

 

                                                    PROTHROMBIN TIME 

PATIENT (test code = 

PTP)            12.0 SECONDS    9.3-12.9        N               

 

                                                    INTERNATIONAL NORMAL 

RATIO (test code = 

INR)            1.1             0.8-1.2         N               TARGET INR BY 

INDICATION  Indication 

INR1. Prophylaxis of 

venous thrombosis 2.0 

- 3.0 (orthopedic 

surgery), Prophylaxis 

of venous thrombosis 

(other than high-risk 

surgery), Treatment of 

Deep Vein 

Thrombosis/Pulmonary 

Embolism, Prevention 

of systemic embolism - 

Tissue heart valves, 

Acute Myocardial 

Infarction (to prevent 

systemic embolism), 

Valvular heart 

disease, Atrial 

Fibrillation, 

Bileaflet mechanical 

valve in aortic 

position.2. Mechanical 

prosthetic valves 

(high risk), 2.5 - 3.5 

Presence of Lupus 

Anticoagulant or 

Antiphospholipid 

Antibodies, Prevention 

of systemic embolism - 

Acute Myocardial 

Infarction (to prevent 

recurrent infarct).





BASIC METABOLIC [id number] 07:17:00* 



                      Test Item  Value      Reference Range Interpretation Comme

nts

 

                                                    SODIUM (test code = 

NA)             133 mEq/L       134-147         L               

 

                                                    POTASSIUM (test code 

= K)            4.3 mEq/L       3.4-5.0         N               

 

                                                    CHLORIDE (test code 

= CL)           96 mEq/L        100-108         L               

 

                                                    CARBON DIOXIDE (test 

code = CO2)     32 mEq/l        21-33           N               

 

                                                    ANION GAP (test code 

= GAP)          10              0-20            N               

 

                                                    GLUCOSE (test code = 

GLU)            189 mg/dL                 H               

 

                                                    BLOOD UREA NITROGEN 

(test code = BUN) 19 mg/dL        7-25            N               

 

                                                    GLOMERULAR 

FILTRATION RATE 

(test code = GFR) 95.2            70-80           H               The Glomerular

 

Filtration Rate is a 

calculated 

parameterbased on serum 

Creatinine, patient age 

and sex. GFR valuesless 

than 60 mL/min/1.73 

square meters are 

indicative ofChronic 

Kidney Disease. Values 

less than 15 

mL/min/1.73square meters 

indicate Kidney failure. 

The calculation forGFR 

is based on the CKD-EPI 

() calculation. This 

formulais race 

indifferent and is the 

recommended formula for 

GFRby the National 

Kidney Foundation for 

Adults.The GFR will not 

calculate if the sex is 

unknown or if 

thepatient's age is <18 

years.

 

                                                    CREATININE (test 

code = CREAT)   0.8 mg/dL       0.6-1.3         N               

 

                                                    CALCIUM (test code = 

CA)             8.4 mg/dL       8.0-10.5        N               





[id number] 07:17:00* 



                      Test Item  Value      Reference Range Interpretation Comme

nts

 

                      MAGNESIUM (test code = MAG) 1.96 mg/dL 1.6-2.6    N       

   





CBC W/AUTO [id number] 06:52:00* 



                      Test Item  Value      Reference Range Interpretation Comme

nts

 

                                                    WHITE BLOOD CELL (test code 

= 

WBC)            8.5 x10 3/uL    4.5-11.0        N               

 

                                                    RED BLOOD CELL (test code = 

RBC)            4.30 x10 6/uL   4.00-5.60       N               

 

                      HEMOGLOBIN (test code = HGB) 12.9 g/dL  12.5-16.9  N      

    

 

                      HEMATOCRIT (test code = HCT) 40.2 %     37.5-50.7  N      

    

 

                                                    MEAN CELL VOLUME (test code 

= 

MCV)            93.5 fL         81.0-99.0       N               

 

                      MEAN CELL HGB (test code = MCH) 30.0 pg    27.0-33.0  N   

       

 

                                                    MEAN CELL HGB CONCETRATION 

(test code = MCHC) 32.1 g/dL       33.0-37.0       L               

 

                                                    RED CELL DISTRIBUTION WIDTH 

CV 

(test code = RDW) 14.3 %          11.5-14.5       N               

 

                                                    RED CELL DISTRIBUTION WIDTH 

SD 

(test code = RDW-SD) 49.1 fL         37.0-54.0       N               

 

                                                    PLATELET COUNT (test code = 

PLT)            144 x10 3/uL    150-400         L               

 

                                                    MEAN PLATELET VOLUME (test c

ode 

= MPV)          10.3 fL         7.0-9.0         H               

 

                      NEUTROPHIL % (test code = NT%) 90.1 %     56.0-77.0  H    

      

 

                                                    IMMATURE GRANULOCYTE % (test

 

code = IG%)     0.9 %           0.0-2.0         N               

 

                      LYMPHOCYTE % (test code = LY%) 4.3 %      14.0-32.0  L    

      

 

                      MONOCYTE % (test code = MO%) 4.6 %      4.8-9.0    L      

    

 

                      EOSINOPHIL % (test code = EO%) 0.0 %      0.3-3.7    L    

      

 

                      BASOPHIL % (test code = BA%) 0.1 %      0.0-2.0    N      

    

 

                                                    NUCLEATED RBC % (test code =

 

NRBC%)          0.0 %           0-0             N               

 

                      NEUTROPHIL # (test code = NT#) 7.63 x10 3/uL 2.0-7.6    H 

         

 

                                                    IMMATURE GRANULOCYTE # (test

 

code = IG#)     0.08 x10 3/uL   0.00-0.03       H               

 

                      LYMPHOCYTE # (test code = LY#) 0.36 x10 3/uL 1.0-3.8    L 

         

 

                      MONOCYTE # (test code = MO#) 0.39 x10 3/uL 0.1-0.8    N   

       

 

                      EOSINOPHIL # (test code = EO#) 0.00 x10 3/uL 0.0-0.2    N 

         

 

                      BASOPHIL # (test code = BA#) 0.01 x10 3/uL 0.0-0.2    N   

       

 

                                                    NUCLEATED RBC # (test code =

 

NRBC#)          0.00 x10 3/uL   0.0-0.1         N               





BASIC METABOLIC [id number] 09:12:00* 



                      Test Item  Value      Reference Range Interpretation Comme

nts

 

                                                    SODIUM (test code = 

NA)             136 mEq/L       134-147         N               

 

                                                    POTASSIUM (test code 

= K)            5.1 mEq/L       3.4-5.0         H               

 

                                                    CHLORIDE (test code 

= CL)           96 mEq/L        100-108         L               

 

                                                    CARBON DIOXIDE (test 

code = CO2)     34 mEq/l        21-33           H               

 

                                                    ANION GAP (test code 

= GAP)          11              0-20            N               

 

                                                    GLUCOSE (test code = 

GLU)            234 mg/dL                 H               

 

                                                    BLOOD UREA NITROGEN 

(test code = BUN) 22 mg/dL        7-25            N               

 

                                                    GLOMERULAR 

FILTRATION RATE 

(test code = GFR) 81.0            70-80           H               The Glomerular

 

Filtration Rate is a 

calculated 

parameterbased on serum 

Creatinine, patient age 

and sex. GFR valuesless 

than 60 mL/min/1.73 

square meters are 

indicative ofChronic 

Kidney Disease. Values 

less than 15 

mL/min/1.73square meters 

indicate Kidney failure. 

The calculation forGFR 

is based on the CKD-EPI 

() calculation. This 

formulais race 

indifferent and is the 

recommended formula for 

GFRby the National 

Kidney Foundation for 

Adults.The GFR will not 

calculate if the sex is 

unknown or if 

thepatient's age is <18 

years.

 

                                                    CREATININE (test 

code = CREAT)   1.0 mg/dL       0.6-1.3         N               

 

                                                    CALCIUM (test code = 

CA)             9.3 mg/dL       8.0-10.5        N               





[id number] 09:12:00* 



                      Test Item  Value      Reference Range Interpretation Comme

nts

 

                      MAGNESIUM (test code = MAG) 2.12 mg/dL 1.6-2.6    N       

   





CBC W/AUTO [id number] 08:57:00* 



                      Test Item  Value      Reference Range Interpretation Comme

nts

 

                                                    WHITE BLOOD CELL (test code 

= 

WBC)            9.6 x10 3/uL    4.5-11.0        N               

 

                                                    RED BLOOD CELL (test code = 

RBC)            4.86 x10 6/uL   4.00-5.60       N               

 

                      HEMOGLOBIN (test code = HGB) 14.6 g/dL  12.5-16.9  N      

    

 

                      HEMATOCRIT (test code = HCT) 46.8 %     37.5-50.7  N      

    

 

                                                    MEAN CELL VOLUME (test code 

= 

MCV)            96.3 fL         81.0-99.0       N               

 

                      MEAN CELL HGB (test code = MCH) 30.0 pg    27.0-33.0  N   

       

 

                                                    MEAN CELL HGB CONCETRATION 

(test code = MCHC) 31.2 g/dL       33.0-37.0       L               

 

                                                    RED CELL DISTRIBUTION WIDTH 

CV 

(test code = RDW) 14.7 %          11.5-14.5       H               

 

                                                    RED CELL DISTRIBUTION WIDTH 

SD 

(test code = RDW-SD) 52.2 fL         37.0-54.0       N               

 

                                                    PLATELET COUNT (test code = 

PLT)            173 x10 3/uL    150-400         N               

 

                                                    MEAN PLATELET VOLUME (test c

ode 

= MPV)          10.7 fL         7.0-9.0         H               

 

                      NEUTROPHIL % (test code = NT%) 89.5 %     56.0-77.0  H    

      

 

                                                    IMMATURE GRANULOCYTE % (test

 

code = IG%)     0.5 %           0.0-2.0         N               

 

                      LYMPHOCYTE % (test code = LY%) 6.8 %      14.0-32.0  L    

      

 

                      MONOCYTE % (test code = MO%) 3.1 %      4.8-9.0    L      

    

 

                      EOSINOPHIL % (test code = EO%) 0.0 %      0.3-3.7    L    

      

 

                      BASOPHIL % (test code = BA%) 0.1 %      0.0-2.0    N      

    

 

                                                    NUCLEATED RBC % (test code =

 

NRBC%)          0.0 %           0-0             N               

 

                      NEUTROPHIL # (test code = NT#) 8.62 x10 3/uL 2.0-7.6    H 

         

 

                                                    IMMATURE GRANULOCYTE # (test

 

code = IG#)     0.05 x10 3/uL   0.00-0.03       H               

 

                      LYMPHOCYTE # (test code = LY#) 0.66 x10 3/uL 1.0-3.8    L 

         

 

                      MONOCYTE # (test code = MO#) 0.30 x10 3/uL 0.1-0.8    N   

       

 

                      EOSINOPHIL # (test code = EO#) 0.00 x10 3/uL 0.0-0.2    N 

         

 

                      BASOPHIL # (test code = BA#) 0.01 x10 3/uL 0.0-0.2    N   

       

 

                                                    NUCLEATED RBC # (test code =

 

NRBC#)          0.00 x10 3/uL   0.0-0.1         N               





COMPREHENSIVE METABOLIC [id number] 06:08:00* 



                      Test Item  Value      Reference Range Interpretation Comme

nts

 

                                                    SODIUM (test code = 

NA)             138 mEq/L       134-147         N               

 

                                                    POTASSIUM (test code 

= K)            4.9 mEq/L       3.4-5.0         N               

 

                                                    CHLORIDE (test code 

= CL)           100 mEq/L       100-108         N               

 

                                                    CARBON DIOXIDE (test 

code = CO2)     34 mEq/l        21-33           H               

 

                                                    ANION GAP (test code 

= GAP)          9               0-20            N               

 

                                                    GLUCOSE (test code = 

GLU)            127 mg/dL                 N               

 

                                                    BLOOD UREA NITROGEN 

(test code = BUN) 20 mg/dL        7-25            N               

 

                                                    GLOMERULAR 

FILTRATION RATE 

(test code = GFR) 95.2            70-80           H               The Glomerular

 

Filtration Rate is a 

calculated 

parameterbased on serum 

Creatinine, patient age 

and sex. GFR valuesless 

than 60 mL/min/1.73 

square meters are 

indicative ofChronic 

Kidney Disease. Values 

less than 15 

mL/min/1.73square 

meters indicate Kidney 

failure. The 

calculation forGFR is 

based on the CKD-EPI 

(202) calculation. 

This formulais race 

indifferent and is the 

recommended formula for 

GFRby the National 

Kidney Foundation for 

Adults.The GFR will not 

calculate if the sex is 

unknown or if 

thepatient's age is <18 

years.

 

                                                    CREATININE (test 

code = CREAT)   0.8 mg/dL       0.6-1.3         N               

 

                                                    TOTAL PROTEIN (test 

code = PROT)    6.7 g/dL        5.7-8.2         N               **Note change in

 

REFERENCE RANGE due to 

change in REAGENT.**

 

                                                    ALBUMIN (test code = 

ALB)            3.20 g/dL       3.4-5.0         L               

 

                                                    CALCIUM (test code = 

CA)             9.2 mg/dL       8.0-10.5        N               

 

                                                    BILIRUBIN TOTAL 

(test code = BILT) 0.50 mg/dL      0.0-1.0         N               

 

                                                    SGOT/AST (test code 

= AST)          32 IUnit/L      8-34            N               

 

                                                    SGPT/ALT (test code 

= ALT)          59 IUnit/L      10-49           H               

 

                                                    ALKALINE PHOSPHATASE 

TOTAL (test code = 

ALKP)           93 IUnit/L                N               





[id number] 06:08:00* 



                      Test Item  Value      Reference Range Interpretation Comme

nts

 

                      MAGNESIUM (test code = MAG) 2.18 mg/dL 1.6-2.6    N       

   





CBC W/AUTO [id number] 05:37:00* 



                      Test Item  Value      Reference Range Interpretation Comme

nts

 

                                                    WHITE BLOOD CELL (test code 

= 

WBC)            9.7 x10 3/uL    4.5-11.0        N               

 

                                                    RED BLOOD CELL (test code = 

RBC)            4.85 x10 6/uL   4.00-5.60       N               

 

                      HEMOGLOBIN (test code = HGB) 14.9 g/dL  12.5-16.9  N      

    

 

                      HEMATOCRIT (test code = HCT) 45.4 %     37.5-50.7  N      

    

 

                                                    MEAN CELL VOLUME (test code 

= 

MCV)            93.6 fL         81.0-99.0       N               

 

                      MEAN CELL HGB (test code = MCH) 30.7 pg    27.0-33.0  N   

       

 

                                                    MEAN CELL HGB CONCETRATION 

(test code = MCHC) 32.8 g/dL       33.0-37.0       L               

 

                                                    RED CELL DISTRIBUTION WIDTH 

CV 

(test code = RDW) 14.5 %          11.5-14.5       N               

 

                                                    RED CELL DISTRIBUTION WIDTH 

SD 

(test code = RDW-SD) 50.3 fL         37.0-54.0       N               

 

                                                    PLATELET COUNT (test code = 

PLT)            188 x10 3/uL    150-400         N               

 

                                                    MEAN PLATELET VOLUME (test c

ode 

= MPV)          10.5 fL         7.0-9.0         H               

 

                      NEUTROPHIL % (test code = NT%) 89.8 %     56.0-77.0  H    

      

 

                                                    IMMATURE GRANULOCYTE % (test

 

code = IG%)     0.5 %           0.0-2.0         N               

 

                      LYMPHOCYTE % (test code = LY%) 4.3 %      14.0-32.0  L    

      

 

                      MONOCYTE % (test code = MO%) 5.3 %      4.8-9.0    N      

    

 

                      EOSINOPHIL % (test code = EO%) 0.0 %      0.3-3.7    L    

      

 

                      BASOPHIL % (test code = BA%) 0.1 %      0.0-2.0    N      

    

 

                                                    NUCLEATED RBC % (test code =

 

NRBC%)          0.0 %           0-0             N               

 

                      NEUTROPHIL # (test code = NT#) 8.72 x10 3/uL 2.0-7.6    H 

         

 

                                                    IMMATURE GRANULOCYTE # (test

 

code = IG#)     0.05 x10 3/uL   0.00-0.03       H               

 

                      LYMPHOCYTE # (test code = LY#) 0.42 x10 3/uL 1.0-3.8    L 

         

 

                      MONOCYTE # (test code = MO#) 0.51 x10 3/uL 0.1-0.8    N   

       

 

                      EOSINOPHIL # (test code = EO#) 0.00 x10 3/uL 0.0-0.2    N 

         

 

                      BASOPHIL # (test code = BA#) 0.01 x10 3/uL 0.0-0.2    N   

       

 

                                                    NUCLEATED RBC # (test code =

 

NRBC#)          0.00 x10 3/uL   0.0-0.1         N               





COMPREHENSIVE METABOLIC [id number] 06:25:00* 



                      Test Item  Value      Reference Range Interpretation Comme

nts

 

                                                    SODIUM (test code = 

NA)             138 mEq/L       134-147         N               

 

                                                    POTASSIUM (test code 

= K)            4.3 mEq/L       3.4-5.0         N               

 

                                                    CHLORIDE (test code 

= CL)           99 mEq/L        100-108         L               

 

                                                    CARBON DIOXIDE (test 

code = CO2)     33 mEq/l        21-33           N               

 

                                                    ANION GAP (test code 

= GAP)          10              0-20            N               

 

                                                    GLUCOSE (test code = 

GLU)            108 mg/dL                 N               

 

                                                    BLOOD UREA NITROGEN 

(test code = BUN) 18 mg/dL        7-25            N               

 

                                                    GLOMERULAR 

FILTRATION RATE 

(test code = GFR) 91.9            70-80           H               The Glomerular

 

Filtration Rate is a 

calculated 

parameterbased on serum 

Creatinine, patient age 

and sex. GFR valuesless 

than 60 mL/min/1.73 

square meters are 

indicative ofChronic 

Kidney Disease. Values 

less than 15 

mL/min/1.73square 

meters indicate Kidney 

failure. The 

calculation forGFR is 

based on the CKD-EPI 

(202) calculation. 

This formulais race 

indifferent and is the 

recommended formula for 

GFRby the National 

Kidney Foundation for 

Adults.The GFR will not 

calculate if the sex is 

unknown or if 

thepatient's age is <18 

years.

 

                                                    CREATININE (test 

code = CREAT)   0.9 mg/dL       0.6-1.3         N               

 

                                                    TOTAL PROTEIN (test 

code = PROT)    7.1 g/dL        5.7-8.2         N               **Note change in

 

REFERENCE RANGE due to 

change in REAGENT.**

 

                                                    ALBUMIN (test code = 

ALB)            3.50 g/dL       3.4-5.0         N               

 

                                                    CALCIUM (test code = 

CA)             9.0 mg/dL       8.0-10.5        N               

 

                                                    BILIRUBIN TOTAL 

(test code = BILT) 0.60 mg/dL      0.0-1.0         N               

 

                                                    SGOT/AST (test code 

= AST)          39 IUnit/L      8-34            H               

 

                                                    SGPT/ALT (test code 

= ALT)          65 IUnit/L      10-49           H               

 

                                                    ALKALINE PHOSPHATASE 

TOTAL (test code = 

ALKP)           95 IUnit/L                N               





[id number] 06:25:00* 



                      Test Item  Value      Reference Range Interpretation Comme

nts

 

                      MAGNESIUM (test code = MAG) 2.28 mg/dL 1.6-2.6    N       

   





CBC W/AUTO [id number] 06:18:00* 



                      Test Item  Value      Reference Range Interpretation Comme

nts

 

                                                    WHITE BLOOD CELL (test code 

= 

WBC)            9.0 x10 3/uL    4.5-11.0        N               

 

                                                    RED BLOOD CELL (test code = 

RBC)            4.93 x10 6/uL   4.00-5.60       N               

 

                      HEMOGLOBIN (test code = HGB) 14.9 g/dL  12.5-16.9  N      

    

 

                      HEMATOCRIT (test code = HCT) 46.5 %     37.5-50.7  N      

    

 

                                                    MEAN CELL VOLUME (test code 

= 

MCV)            94.3 fL         81.0-99.0       N               

 

                      MEAN CELL HGB (test code = MCH) 30.2 pg    27.0-33.0  N   

       

 

                                                    MEAN CELL HGB CONCETRATION 

(test code = MCHC) 32.0 g/dL       33.0-37.0       L               

 

                                                    RED CELL DISTRIBUTION WIDTH 

CV 

(test code = RDW) 14.3 %          11.5-14.5       N               

 

                                                    RED CELL DISTRIBUTION WIDTH 

SD 

(test code = RDW-SD) 49.7 fL         37.0-54.0       N               

 

                                                    PLATELET COUNT (test code = 

PLT)            195 x10 3/uL    150-400         N               

 

                                                    MEAN PLATELET VOLUME (test c

ode 

= MPV)          10.8 fL         7.0-9.0         H               

 

                      NEUTROPHIL % (test code = NT%) 88.9 %     56.0-77.0  H    

      

 

                                                    IMMATURE GRANULOCYTE % (test

 

code = IG%)     0.7 %           0.0-2.0         N               

 

                      LYMPHOCYTE % (test code = LY%) 4.9 %      14.0-32.0  L    

      

 

                      MONOCYTE % (test code = MO%) 5.5 %      4.8-9.0    N      

    

 

                      EOSINOPHIL % (test code = EO%) 0.0 %      0.3-3.7    L    

      

 

                      BASOPHIL % (test code = BA%) 0.0 %      0.0-2.0    N      

    

 

                                                    NUCLEATED RBC % (test code =

 

NRBC%)          0.0 %           0-0             N               

 

                      NEUTROPHIL # (test code = NT#) 8.03 x10 3/uL 2.0-7.6    H 

         

 

                                                    IMMATURE GRANULOCYTE # (test

 

code = IG#)     0.06 x10 3/uL   0.00-0.03       H               

 

                      LYMPHOCYTE # (test code = LY#) 0.44 x10 3/uL 1.0-3.8    L 

         

 

                      MONOCYTE # (test code = MO#) 0.50 x10 3/uL 0.1-0.8    N   

       

 

                      EOSINOPHIL # (test code = EO#) 0.00 x10 3/uL 0.0-0.2    N 

         

 

                      BASOPHIL # (test code = BA#) 0.00 x10 3/uL 0.0-0.2    N   

       

 

                                                    NUCLEATED RBC # (test code =

 

NRBC#)          0.00 x10 3/uL   0.0-0.1         N               





COMPREHENSIVE METABOLIC [id number] 06:04:00* 



                      Test Item  Value      Reference Range Interpretation Comme

nts

 

                                                    SODIUM (test code = 

NA)             137 mEq/L       134-147         N               

 

                                                    POTASSIUM (test code 

= K)            4.3 mEq/L       3.4-5.0         N               

 

                                                    CHLORIDE (test code 

= CL)           97 mEq/L        100-108         L               

 

                                                    CARBON DIOXIDE (test 

code = CO2)     38 mEq/l        21-33           H               

 

                                                    ANION GAP (test code 

= GAP)          6               0-20            N               

 

                                                    GLUCOSE (test code = 

GLU)            116 mg/dL                 N               

 

                                                    BLOOD UREA NITROGEN 

(test code = BUN) 23 mg/dL        7-25            N               

 

                                                    GLOMERULAR 

FILTRATION RATE 

(test code = GFR) 91.9            70-80           H               The Glomerular

 

Filtration Rate is a 

calculated 

parameterbased on serum 

Creatinine, patient age 

and sex. GFR valuesless 

than 60 mL/min/1.73 

square meters are 

indicative ofChronic 

Kidney Disease. Values 

less than 15 

mL/min/1.73square 

meters indicate Kidney 

failure. The 

calculation forGFR is 

based on the CKD-EPI 

(202) calculation. 

This formulais race 

indifferent and is the 

recommended formula for 

GFRby the National 

Kidney Foundation for 

Adults.The GFR will not 

calculate if the sex is 

unknown or if 

thepatient's age is <18 

years.

 

                                                    CREATININE (test 

code = CREAT)   0.9 mg/dL       0.6-1.3         N               

 

                                                    TOTAL PROTEIN (test 

code = PROT)    6.5 g/dL        5.7-8.2         N               **Note change in

 

REFERENCE RANGE due to 

change in REAGENT.**

 

                                                    ALBUMIN (test code = 

ALB)            3.20 g/dL       3.4-5.0         L               

 

                                                    CALCIUM (test code = 

CA)             8.7 mg/dL       8.0-10.5                        

 

                                                    BILIRUBIN TOTAL 

(test code = BILT) 0.50 mg/dL      0.0-1.0         N               

 

                                                    SGOT/AST (test code 

= AST)          50 IUnit/L      8-34            H               

 

                                                    SGPT/ALT (test code 

= ALT)          65 IUnit/L      10-49           H               

 

                                                    ALKALINE PHOSPHATASE 

TOTAL (test code = 

ALKP)           85 IUnit/L                N               





[id number] 06:04:00* 



                      Test Item  Value      Reference Range Interpretation Comme

nts

 

                      PHOSPHOROUS (test code = PHOS) 2.7 MG/DL  2.5-4.9    N    

      





[id number] 06:04:00* 



                      Test Item  Value      Reference Range Interpretation Comme

nts

 

                      MAGNESIUM (test code = MAG) 2.33 mg/dL 1.6-2.6    N       

   





CALCIUM [id number] 06:04:00* 



                      Test Item  Value      Reference Range Interpretation Comme

nts

 

                      CALCIUM IONIZED (test code = DEYANIRA) 1.14 MMOL/L 1.09-1.30  N

          





CBC W/AUTO [id number] 04:44:00* 



                      Test Item  Value      Reference Range Interpretation Comme

nts

 

                                                    WHITE BLOOD CELL (test code 

= 

WBC)            9.5 x10 3/uL    4.5-11.0        N               

 

                                                    RED BLOOD CELL (test code = 

RBC)            4.44 x10 6/uL   4.00-5.60       N               

 

                      HEMOGLOBIN (test code = HGB) 13.9 g/dL  12.5-16.9         

    

 

                      HEMATOCRIT (test code = HCT) 42.0 %     37.5-50.7         

    

 

                                                    MEAN CELL VOLUME (test code 

= 

MCV)            94.6 fL         81.0-99.0       N               

 

                      MEAN CELL HGB (test code = MCH) 31.3 pg    27.0-33.0  N   

       

 

                                                    MEAN CELL HGB CONCETRATION 

(test code = MCHC) 33.1 g/dL       33.0-37.0       N               

 

                                                    RED CELL DISTRIBUTION WIDTH 

CV 

(test code = RDW) 14.4 %          11.5-14.5       N               

 

                                                    RED CELL DISTRIBUTION WIDTH 

SD 

(test code = RDW-SD) 50.0 fL         37.0-54.0       N               

 

                                                    PLATELET COUNT (test code = 

PLT)            190 x10 3/uL    150-400         N               

 

                                                    MEAN PLATELET VOLUME (test c

ode 

= MPV)          10.5 fL         7.0-9.0         H               

 

                      NEUTROPHIL % (test code = NT%) 88.2 %     56.0-77.0  H    

      

 

                                                    IMMATURE GRANULOCYTE % (test

 

code = IG%)     0.8 %           0.0-2.0         N               

 

                      LYMPHOCYTE % (test code = LY%) 5.0 %      14.0-32.0  L    

      

 

                      MONOCYTE % (test code = MO%) 5.9 %      4.8-9.0    N      

    

 

                      EOSINOPHIL % (test code = EO%) 0.0 %      0.3-3.7    L    

      

 

                      BASOPHIL % (test code = BA%) 0.1 %      0.0-2.0    N      

    

 

                                                    NUCLEATED RBC % (test code =

 

NRBC%)          0.0 %           0-0             N               

 

                      NEUTROPHIL # (test code = NT#) 8.36 x10 3/uL 2.0-7.6    H 

         

 

                                                    IMMATURE GRANULOCYTE # (test

 

code = IG#)     0.08 x10 3/uL   0.00-0.03       H               

 

                      LYMPHOCYTE # (test code = LY#) 0.47 x10 3/uL 1.0-3.8    L 

         

 

                      MONOCYTE # (test code = MO#) 0.56 x10 3/uL 0.1-0.8    N   

       

 

                      EOSINOPHIL # (test code = EO#) 0.00 x10 3/uL 0.0-0.2    N 

         

 

                      BASOPHIL # (test code = BA#) 0.01 x10 3/uL 0.0-0.2    N   

       

 

                                                    NUCLEATED RBC # (test code =

 

NRBC#)          0.00 x10 3/uL   0.0-0.1         N               





BASIC METABOLIC [id number] 05:55:00* 



                      Test Item  Value      Reference Range Interpretation Comme

nts

 

                                                    SODIUM (test code = 

NA)             141 mEq/L       134-147         N               

 

                                                    POTASSIUM (test code 

= K)            4.7 mEq/L       3.4-5.0         N               

 

                                                    CHLORIDE (test code 

= CL)           107 mEq/L       100-108         N               

 

                                                    CARBON DIOXIDE (test 

code = CO2)     31 mEq/l        21-33           N               

 

                                                    ANION GAP (test code 

= GAP)          8               0-20            N               

 

                                                    GLUCOSE (test code = 

GLU)            128 mg/dL                 N               

 

                                                    BLOOD UREA NITROGEN 

(test code = BUN) 23 mg/dL        7-25            N               

 

                                                    GLOMERULAR 

FILTRATION RATE 

(test code = GFR) 95.2            70-80           H               The Glomerular

 

Filtration Rate is a 

calculated 

parameterbased on serum 

Creatinine, patient age 

and sex. GFR valuesless 

than 60 mL/min/1.73 

square meters are 

indicative ofChronic 

Kidney Disease. Values 

less than 15 

mL/min/1.73square meters 

indicate Kidney failure. 

The calculation forGFR 

is based on the CKD-EPI 

() calculation. This 

formulais race 

indifferent and is the 

recommended formula for 

GFRby the National 

Kidney Foundation for 

Adults.The GFR will not 

calculate if the sex is 

unknown or if 

thepatient's age is <18 

years.

 

                                                    CREATININE (test 

code = CREAT)   0.8 mg/dL       0.6-1.3         N               

 

                                                    CALCIUM (test code = 

CA)             6.9 mg/dL       8.0-10.5        L               





[id number] 05:55:00* 



                      Test Item  Value      Reference Range Interpretation Comme

nts

 

                      MAGNESIUM (test code = MAG) 1.87 mg/dL 1.6-2.6            

   





CBC W/AUTO [id number] 05:31:00* 



                      Test Item  Value      Reference Range Interpretation Comme

nts

 

                                                    WHITE BLOOD CELL (test code 

= 

WBC)            8.5 x10 3/uL    4.5-11.0        N               

 

                                                    RED BLOOD CELL (test code = 

RBC)            3.53 x10 6/uL   4.00-5.60       L               

 

                      HEMOGLOBIN (test code = HGB) 10.6 g/dL  12.5-16.9  L      

    

 

                      HEMATOCRIT (test code = HCT) 34.1 %     37.5-50.7  L      

    

 

                                                    MEAN CELL VOLUME (test code 

= 

MCV)            96.6 fL         81.0-99.0       N               

 

                      MEAN CELL HGB (test code = MCH) 30.0 pg    27.0-33.0  N   

       

 

                                                    MEAN CELL HGB CONCETRATION 

(test code = MCHC) 31.1 g/dL       33.0-37.0       L               

 

                                                    RED CELL DISTRIBUTION WIDTH 

CV 

(test code = RDW) 14.7 %          11.5-14.5       H               

 

                                                    RED CELL DISTRIBUTION WIDTH 

SD 

(test code = RDW-SD) 51.8 fL         37.0-54.0       N               

 

                                                    PLATELET COUNT (test code = 

PLT)            155 x10 3/uL    150-400         N               

 

                                                    MEAN PLATELET VOLUME (test c

ode 

= MPV)          10.2 fL         7.0-9.0         H               

 

                      NEUTROPHIL % (test code = NT%) 89.0 %     56.0-77.0  H    

      

 

                                                    IMMATURE GRANULOCYTE % (test

 

code = IG%)     0.8 %           0.0-2.0         N               

 

                      LYMPHOCYTE % (test code = LY%) 3.5 %      14.0-32.0  L    

      

 

                      MONOCYTE % (test code = MO%) 6.7 %      4.8-9.0    N      

    

 

                      EOSINOPHIL % (test code = EO%) 0.0 %      0.3-3.7    L    

      

 

                      BASOPHIL % (test code = BA%) 0.0 %      0.0-2.0    N      

    

 

                                                    NUCLEATED RBC % (test code =

 

NRBC%)          0.0 %           0-0             N               

 

                      NEUTROPHIL # (test code = NT#) 7.57 x10 3/uL 2.0-7.6    N 

         

 

                                                    IMMATURE GRANULOCYTE # (test

 

code = IG#)     0.07 x10 3/uL   0.00-0.03       H               

 

                      LYMPHOCYTE # (test code = LY#) 0.30 x10 3/uL 1.0-3.8    L 

         

 

                      MONOCYTE # (test code = MO#) 0.57 x10 3/uL 0.1-0.8    N   

       

 

                      EOSINOPHIL # (test code = EO#) 0.00 x10 3/uL 0.0-0.2    N 

         

 

                      BASOPHIL # (test code = BA#) 0.00 x10 3/uL 0.0-0.2    N   

       

 

                                                    NUCLEATED RBC # (test code =

 

NRBC#)          0.00 x10 3/uL   0.0-0.1         N               





B-TYPE NATRIURETIC [id number] 10:16:00* 



                      Test Item  Value      Reference Range Interpretation Comme

nts

 

                                                    B-TYPE NATRIURETIC PEPTIDE (

test 

code = BNP)     174.0 PG/ML     0-100           H               





BASIC METABOLIC [id number] 10:14:00* 



                      Test Item  Value      Reference Range Interpretation Comme

nts

 

                                                    SODIUM (test code = 

NA)             135 mEq/L       134-147         N               

 

                                                    POTASSIUM (test code 

= K)            5.0 mEq/L       3.4-5.0         N               

 

                                                    CHLORIDE (test code 

= CL)           102 mEq/L       100-108         N               

 

                                                    CARBON DIOXIDE (test 

code = CO2)     34 mEq/l        21-33           H               

 

                                                    ANION GAP (test code 

= GAP)          4               0-20            N               

 

                                                    GLUCOSE (test code = 

GLU)            131 mg/dL                 N               

 

                                                    BLOOD UREA NITROGEN 

(test code = BUN) 30 mg/dL        7-25            H               

 

                                                    GLOMERULAR 

FILTRATION RATE 

(test code = GFR) 72.2            70-80           N               The Glomerular

 

Filtration Rate is a 

calculated 

parameterbased on serum 

Creatinine, patient age 

and sex. GFR valuesless 

than 60 mL/min/1.73 

square meters are 

indicative ofChronic 

Kidney Disease. Values 

less than 15 

mL/min/1.73square meters 

indicate Kidney failure. 

The calculation forGFR 

is based on the CKD-EPI 

(2021) calculation. This 

formulais race 

indifferent and is the 

recommended formula for 

GFRby the National 

Kidney Foundation for 

Adults.The GFR will not 

calculate if the sex is 

unknown or if 

thepatient's age is <18 

years.

 

                                                    CREATININE (test 

code = CREAT)   1.1 mg/dL       0.6-1.3         N               

 

                                                    CALCIUM (test code = 

CA)             8.3 mg/dL       8.0-10.5        N               





COMPREHENSIVE METABOLIC [id number] 04:03:00* 



                      Test Item  Value      Reference Range Interpretation Comme

nts

 

                                                    SODIUM (test code = 

NA)             137 mEq/L       134-147         N               

 

                                                    POTASSIUM (test code 

= K)            5.5 mEq/L       3.4-5.0         H               

 

                                                    CHLORIDE (test code 

= CL)           103 mEq/L       100-108         N               

 

                                                    CARBON DIOXIDE (test 

code = CO2)     33 mEq/l        21-33           N               

 

                                                    ANION GAP (test code 

= GAP)          7               0-20            N               

 

                                                    GLUCOSE (test code = 

GLU)            152 mg/dL                 H               

 

                                                    BLOOD UREA NITROGEN 

(test code = BUN) 28 mg/dL        7-25            H               

 

                                                    GLOMERULAR 

FILTRATION RATE 

(test code = GFR) 65.1            70-80           L               The Glomerular

 

Filtration Rate is a 

calculated 

parameterbased on serum 

Creatinine, patient age 

and sex. GFR valuesless 

than 60 mL/min/1.73 

square meters are 

indicative ofChronic 

Kidney Disease. Values 

less than 15 

mL/min/1.73square 

meters indicate Kidney 

failure. The 

calculation forGFR is 

based on the CKD-EPI 

(2021) calculation. 

This formulais race 

indifferent and is the 

recommended formula for 

GFRby the National 

Kidney Foundation for 

Adults.The GFR will not 

calculate if the sex is 

unknown or if 

thepatient's age is <18 

years.

 

                                                    CREATININE (test 

code = CREAT)   1.2 mg/dL       0.6-1.3         N               

 

                                                    TOTAL PROTEIN (test 

code = PROT)    6.8 g/dL        5.7-8.2                         **Note change in

 

REFERENCE RANGE due to 

change in REAGENT.**

 

                                                    ALBUMIN (test code = 

ALB)            3.40 g/dL       3.4-5.0         N               

 

                                                    CALCIUM (test code = 

CA)             8.8 mg/dL       8.0-10.5        N               

 

                                                    BILIRUBIN TOTAL 

(test code = BILT) 0.50 mg/dL      0.0-1.0         N               

 

                                                    SGOT/AST (test code 

= AST)          60 IUnit/L      8-34            H               

 

                                                    SGPT/ALT (test code 

= ALT)          48 IUnit/L      10-49           N               

 

                                                    ALKALINE PHOSPHATASE 

TOTAL (test code = 

ALKP)           93 IUnit/L                N               





[id number] 04:03:00* 



                      Test Item  Value      Reference Range Interpretation Comme

nts

 

                      PHOSPHOROUS (test code = PHOS) 3.9 MG/DL  2.5-4.9    N    

      





[id number] 04:03:00* 



                      Test Item  Value      Reference Range Interpretation Comme

nts

 

                      MAGNESIUM (test code = MAG) 2.64 mg/dL 1.6-2.6    H       

   





CALCIUM [id number] 04:03:00* 



                      Test Item  Value      Reference Range Interpretation Comme

nts

 

                      CALCIUM IONIZED (test code = DEYANIRA) 1.12 MMOL/L 1.09-1.30  N

          





CBC W/AUTO [id number] 02:55:00* 



                      Test Item  Value      Reference Range Interpretation Comme

nts

 

                                                    WHITE BLOOD CELL (test code 

= 

WBC)            11.2 x10 3/uL   4.5-11.0        H               

 

                                                    RED BLOOD CELL (test code = 

RBC)            4.64 x10 6/uL   4.00-5.60       N               

 

                      HEMOGLOBIN (test code = HGB) 14.5 g/dL  12.5-16.9  N      

    

 

                      HEMATOCRIT (test code = HCT) 44.1 %     37.5-50.7  N      

    

 

                                                    MEAN CELL VOLUME (test code 

= 

MCV)            95.0 fL         81.0-99.0       N               

 

                                                    MEAN CELL HGB (test code = 

MCH)            31.3 pg         27.0-33.0       N               

 

                                                    MEAN CELL HGB CONCETRATION 

(test code = MCHC) 32.9 g/dL       33.0-37.0       L               

 

                                                    RED CELL DISTRIBUTION WIDTH 

CV 

(test code = RDW) 14.9 %          11.5-14.5       H               

 

                                                    RED CELL DISTRIBUTION WIDTH 

SD 

(test code = RDW-SD) 51.5 fL         37.0-54.0       N               

 

                                                    PLATELET COUNT (test code = 

PLT)            243 x10 3/uL    150-400         N               

 

                                                    MEAN PLATELET VOLUME (test 

code = MPV)     9.7 fL          7.0-9.0         H               

 

                      NEUTROPHIL % (test code = NT%) 91.1 %     56.0-77.0  H    

      

 

                                                    IMMATURE GRANULOCYTE % (test

 

code = IG%)     0.5 %           0.0-2.0         N               

 

                      LYMPHOCYTE % (test code = LY%) 3.8 %      14.0-32.0  L    

      

 

                      MONOCYTE % (test code = MO%) 4.5 %      4.8-9.0    L      

    

 

                      EOSINOPHIL % (test code = EO%) 0.0 %      0.3-3.7    L    

      

 

                      BASOPHIL % (test code = BA%) 0.1 %      0.0-2.0    N      

    

 

                                                    NUCLEATED RBC % (test code =

 

NRBC%)          0.0 %           0-0             N               

 

                      NEUTROPHIL # (test code = NT#) 10.17 x10 3/uL 2.0-7.6    H

          

 

                                                    IMMATURE GRANULOCYTE # (test

 

code = IG#)     0.06 x10 3/uL   0.00-0.03       H               

 

                      LYMPHOCYTE # (test code = LY#) 0.42 x10 3/uL 1.0-3.8    L 

         

 

                      MONOCYTE # (test code = MO#) 0.50 x10 3/uL 0.1-0.8    N   

       

 

                      EOSINOPHIL # (test code = EO#) 0.00 x10 3/uL 0.0-0.2    N 

         

 

                      BASOPHIL # (test code = BA#) 0.01 x10 3/uL 0.0-0.2    N   

       

 

                                                    NUCLEATED RBC # (test code =

 

NRBC#)          0.00 x10 3/uL   0.0-0.1         N               





SED RATE [id number] 21:17:00* 



                      Test Item  Value      Reference Range Interpretation Comme

nts

 

                                                    SED RATE WESTERGREN (test co

de = 

SEDW)           18 mm/hr        0-15            H               





LACTIC [id number] 21:05:00* 



                      Test Item  Value      Reference Range Interpretation Comme

nts

 

                      LACTIC ACID (test code = LACT) 1.6 mmol/L 0.4-1.9    N    

      





BASIC METABOLIC [id number] 20:54:00* 



                      Test Item  Value      Reference Range Interpretation Comme

nts

 

                                                    SODIUM (test code = 

NA)             135 mEq/L       134-147         N               

 

                                                    POTASSIUM (test code 

= K)            5.4 mEq/L       3.4-5.0         H               

 

                                                    CHLORIDE (test code 

= CL)           103 mEq/L       100-108         N               

 

                                                    CARBON DIOXIDE (test 

code = CO2)     27 mEq/l        21-33           N               

 

                                                    ANION GAP (test code 

= GAP)          10              0-20            N               

 

                                                    GLUCOSE (test code = 

GLU)            138 mg/dL                 N               

 

                                                    BLOOD UREA NITROGEN 

(test code = BUN) 24 mg/dL        7-25            N               

 

                                                    GLOMERULAR 

FILTRATION RATE 

(test code = GFR) 59.1            70-80           L               The Glomerular

 

Filtration Rate is a 

calculated 

parameterbased on serum 

Creatinine, patient age 

and sex. GFR valuesless 

than 60 mL/min/1.73 

square meters are 

indicative ofChronic 

Kidney Disease. Values 

less than 15 

mL/min/1.73square meters 

indicate Kidney failure. 

The calculation forGFR 

is based on the CKD-EPI 

() calculation. This 

formulais race 

indifferent and is the 

recommended formula for 

GFRby the National 

Kidney Foundation for 

Adults.The GFR will not 

calculate if the sex is 

unknown or if 

thepatient's age is <18 

years.

 

                                                    CREATININE (test 

code = CREAT)   1.3 mg/dL       0.6-1.3         N               

 

                                                    CALCIUM (test code = 

CA)             9.0 mg/dL       8.0-10.5        N               





UA RFLX MICR CULT IF [id number] 19:43:00* 



                      Test Item  Value      Reference Range Interpretation Comme

nts

 

                      UA COLOR (test code = COLU) YELLOW     YEL/STRAW          

   

 

                                                    UA APPEARANCE (test code = 

APPU)           CLEAR           CLEAR                           

 

                                                    UA GLUCOSE DIPSTICK (test co

de 

= DGLUU)        NEGATIVE        NEGATIVE                        

 

                                                    UA BILIRUBIN DIPSTICK (test 

code = BILU)    NEGATIVE        NEGATIVE                        

 

                                                    UA KETONE DIPSTICK (test cod

e 

= KETU)         NEGATIVE        NEGATIVE                        

 

                                                    UA SPECIFIC GRAVITY (test co

de 

= SGU)          1.058           1.005-1.030     H               

 

                                                    UA BLOOD DIPSTICK (test code

 = 

BASILIA)            NEGATIVE        NEGATIVE                        

 

                                                    UA PH DIPSTICK (test code = 

DOMINIC)            5.0             5.0-7.0         N               

 

                                                    UA PROTEIN DIPSTICK (test co

de 

= PROU)         NEGATIVE        NEGATIVE                        

 

                                                    UA UROBILINIOGEN DIPSTICK 

(test code = URO) 0.2 mg/dL       0.2-1.0                         

 

                                                    UA NITRITE DIPSTICK (test co

de 

= ABDIFATAH)         NEGATIVE        NEGATIVE                        

 

                                                    UA LEUKOCYTE ESTERASE DIPSTI

CK 

(test code = LEUU) NEGATIVE        NEGATIVE                        

 

                      UA WBC (test code = WBCU) 0-3 WBC/HPF 0-3                 

  

 

                      UA RBC (test code = RBCU) 0-3 RBC/HPF 0-3                 

  

 

                                                    UA WBC NO REFLEX (test code 

= 

WBCUCL)         0-3 WBC/HPF     0-3                             

 

                      UA BACTERIA (test code = BACU) TRACE /HPF NONE SEEN       

      

 

                                                    UA SQUAMOUS CELLS (test code

 = 

SQU)            NONE SEEN /HPF  NONE SEEN                       

 

                                                    UA HYALINE CAST (test code =

 

HYALU)          0-2 /LPF        NONE SEEN                       

 

                      UA MUCUS (test code = MUCU) TRACE /LPF NONE SEEN          

   





Indication for culture: Dysuria/FrequencySpecimen Description: CLEAN CATCHPLT 
[id number] 19:32:00* 



                      Test Item  Value      Reference Range Interpretation Comme

nts

 

                                                    PLATELET ESTIMATE 

(test code = PLTEST) x10 3/uL        150-400                         

 

                                                    PLATELET MORPHOLOGY 

(test code = PLTMORPH) AGGREGATES                                      FEW, SMAL

L AGGREGATES





CBC W/AUTO [id number] 19:32:00* 



                      Test Item  Value      Reference Range Interpretation Comme

nts

 

                                                    WHITE BLOOD CELL (test code 

= 

WBC)            10.7 x10 3/uL   4.5-11.0        N               

 

                                                    RED BLOOD CELL (test code = 

RBC)            5.46 x10 6/uL   4.00-5.60       N               

 

                      HEMOGLOBIN (test code = HGB) 16.5 g/dL  12.5-16.9  N      

    

 

                      HEMATOCRIT (test code = HCT) 51.8 %     37.5-50.7  H      

    

 

                                                    MEAN CELL VOLUME (test code 

= 

MCV)            94.9 fL         81.0-99.0       N               

 

                      MEAN CELL HGB (test code = MCH) 30.2 pg    27.0-33.0  N   

       

 

                                                    MEAN CELL HGB CONCETRATION 

(test code = MCHC) 31.9 g/dL       33.0-37.0       L               

 

                                                    RED CELL DISTRIBUTION WIDTH 

CV 

(test code = RDW) 15.3 %          11.5-14.5       H               

 

                                                    RED CELL DISTRIBUTION WIDTH 

SD 

(test code = RDW-SD) 52.8 fL         37.0-54.0       N               

 

                                                    PLATELET COUNT (test code = 

PLT)            199 x10 3/uL    150-400         N               

 

                                                    IMMATURE PLATELET FRACTION 

(test code = IPF) 11.4 %          0.9-11.2        H               

 

                                                    MEAN PLATELET VOLUME (test c

ode 

= MPV)          11.1 fL         7.0-9.0         H               

 

                      NEUTROPHIL % (test code = NT%) 92.8 %     56.0-77.0  H    

      

 

                                                    IMMATURE GRANULOCYTE % (test

 

code = IG%)     0.9 %           0.0-2.0         N               

 

                      LYMPHOCYTE % (test code = LY%) 2.9 %      14.0-32.0  L    

      

 

                      MONOCYTE % (test code = MO%) 3.0 %      4.8-9.0    L      

    

 

                      EOSINOPHIL % (test code = EO%) 0.2 %      0.3-3.7    L    

      

 

                      BASOPHIL % (test code = BA%) 0.2 %      0.0-2.0    N      

    

 

                                                    NUCLEATED RBC % (test code =

 

NRBC%)          0.0 %           0-0             N               

 

                      NEUTROPHIL # (test code = NT#) 9.94 x10 3/uL 2.0-7.6    H 

         

 

                                                    IMMATURE GRANULOCYTE # (test

 

code = IG#)     0.10 x10 3/uL   0.00-0.03       H               

 

                      LYMPHOCYTE # (test code = LY#) 0.31 x10 3/uL 1.0-3.8    L 

         

 

                      MONOCYTE # (test code = MO#) 0.32 x10 3/uL 0.1-0.8    N   

       

 

                      EOSINOPHIL # (test code = EO#) 0.02 x10 3/uL 0.0-0.2    N 

         

 

                      BASOPHIL # (test code = BA#) 0.02 x10 3/uL 0.0-0.2    N   

       

 

                                                    NUCLEATED RBC # (test code =

 

NRBC#)          0.00 x10 3/uL   0.0-0.1         N               





SED RATE [id number] 19:24:00* 



                      Test Item  Value      Reference Range Interpretation Comme

nts

 

                      SED RATE WESTERGREN (test code = SEDW) mm/hr      0-15    

              





COVID 19 INHOUSE [id number] 19:14:00* 



                      Test Item  Value      Reference Range Interpretation Comme

nts

 

                                                    COVID 19 INHOUSE 

AG (test code = 

[id number])    Negative        Negative                        A negative resul

t is 

presumptive and should be 

confirmedwith an FDA 

authorized molecular assay, 

if necessary forpatient 

management.A positive 

result does not rule out 

co-infections withother 

pathogens.This test detects 

both viable (live) and 

non-viable,SARS-CoV, and 

SARS-CoV-2. Test 

performance depends on 

theamount of virus 

(antigen) in the 

sample.This test has not 

been FDA cleared or 

approved; the test hasbeen 

authorized by FDA under an 

Emergency Use 

Authorization(EUA) for use 

by laboratories certified 

under the CLIA thatmeet the 

requirements to perform 

moderate, high or 

waivedcomplexity tests.





B-TYPE NATRIURETIC [id number] 19:04:00* 



                      Test Item  Value      Reference Range Interpretation Comme

nts

 

                                                    B-TYPE NATRIURETIC PEPTIDE (

test 

code = BNP)     85.0 PG/ML      0-100           N               





COMPREHENSIVE METABOLIC [id number] 18:52:00* 



                      Test Item  Value      Reference Range Interpretation Comme

nts

 

                                                    SODIUM (test code = 

NA)             137 mEq/L       134-147         N               

 

                                                    POTASSIUM (test code 

= K)            5.7 mEq/L       3.4-5.0         H               

 

                                                    CHLORIDE (test code 

= CL)           101 mEq/L       100-108         N               

 

                                                    CARBON DIOXIDE (test 

code = CO2)     31 mEq/l        21-33           N               

 

                                                    ANION GAP (test code 

= GAP)          11              0-20            N               

 

                                                    GLUCOSE (test code = 

GLU)            146 mg/dL                 H               

 

                                                    BLOOD UREA NITROGEN 

(test code = BUN) 25 mg/dL        7-25            N               

 

                                                    GLOMERULAR 

FILTRATION RATE 

(test code = GFR) 59.1            70-80           L               The Glomerular

 

Filtration Rate is a 

calculated 

parameterbased on 

serum Creatinine, 

patient age and sex. 

GFR valuesless than 60 

mL/min/1.73 square 

meters are indicative 

ofChronic Kidney 

Disease. Values less 

than 15 

mL/min/1.73square 

meters indicate Kidney 

failure. The 

calculation forGFR is 

based on the CKD-EPI 

() calculation. 

This formulais race 

indifferent and is the 

recommended formula 

for GFRby the National 

Kidney Foundation for 

Adults.The GFR will 

not calculate if the 

sex is unknown or if 

thepatient's age is 

<18 years.

 

                                                    CREATININE (test 

code = CREAT)   1.3 mg/dL       0.6-1.3         N               

 

                                                    TOTAL PROTEIN (test 

code = PROT)    8.1 g/dL        5.7-8.2         N               **Note change in

 

REFERENCE RANGE due to 

change in REAGENT.**

 

                                                    ALBUMIN (test code = 

ALB)            4.10 g/dL       3.4-5.0         N               

 

                                                    CALCIUM (test code = 

CA)             9.5 mg/dL       8.0-10.5        N               

 

                                                    BILIRUBIN TOTAL 

(test code = BILT) 0.50 mg/dL      0.0-1.0         N               

 

                                                    SGOT/AST (test code 

= AST)          39 IUnit/L      8-34            H               

 

                                                    SGPT/ALT (test code 

= ALT)          51 IUnit/L      10-49           H               

 

                                                    ALKALINE PHOSPHATASE 

TOTAL (test code = 

ALKP)           116 IUnit/L               N               





LIPID PROFILE (CORONARY RISK)2025 18:52:00* 



                      Test Item  Value      Reference Range Interpretation Comme

nts

 

                                                    TRIGLYCERIDES (test 

code = TRIG)    148 mg/dL                 N               

 

                                                    CHOLESTEROL (test 

code = CHOL)    270 mg/dL       <200            H               

 

                                                    CHOLESTEROL/HDL 

RATIO (test code = 

CHOLHDL)        2.46 RATIO      3.43-4.97       L               RISK ASSOCIATED 

WITH 

CHOL/HDL RATIOS: RISK 

MALE FEMALE1/2 AVERAGE 

3.43  3.27AVERAGE 4.97 

4.442X AVERAGE 9.55 

7.053X AVERAGE 23.39 

11.04 NOTE THAT THE 

REFERENCE VALUE IS 

RELATEDTO RISK LEVELS 

AS RECOMMENDED BY THE 

NATL.HEART, LUNG, AND 

BLOOD INST.

 

                                                    HDL CHOLESTEROL 

(test code = HDL) 109.9 MG/DL     40-60           H               HDL Interpreta

tion < 

40.0 mg/dL Low 

(undesirable, high 

risk)> 60.0 mg/dL High 

(desirable, low risk) 

Reference interval for 

healthy adults was 

established by 

theNational 

Cholesterol Education 

Program (NCEP).

 

                                                    LIPOPROTEIN LDL 

(test code = LDL) 143.0 mg/dL     0-100           H               <100 IAXFLIX25

0-129 

NEAR OPTIMAL/ABOVE 

[id number] 

[id number] 

HIGH>SC=532 VERY 

HIGH*Guidelines 

provided by the 

National Cholesterol 

EducationProgram Adult 

Treatment Panel III





[id number] 18:52:00* 



                      Test Item  Value      Reference Range Interpretation Comme

nts

 

                      MAGNESIUM (test code = MAG) 2.36 mg/dL 1.6-2.6    N       

   





TROP-I HIGH [id number] 18:52:00* 



                      Test Item  Value      Reference Range Interpretation Comme

nts

 

                                                    TROP-I HIGH 

SENSITIVITY (test 

code = TROPIHS) 469 ng/L        0-54            HH              Critical result 

called to 

ROSA HACKETT RNby 

[id number] at 1852 

25Nurse read back 

result and tech confirmed 

it's correct? YESCAUTION: 

Units of the current test 

methodology (ng/L) 

differfrom the prior test 

methodology (ng/mL) by a 

factor of 

1000.---------------------

--------------------------

------------99th 

Percentile Upper Reference 

Limit (URL): Females: 34 

ng/LMales: 54 ng/L In 

order to distinguish acute 

elevations of high 

sensitivitytroponin from 

other clinical conditions, 

the FourthUniversal 

Definition of Myocardial 

Infarction 

stressesclinical 

assessment and the 

demonstration of a rise 

and/orfall in serial 

troponin results above the 

URL. These results were 

obtained using [employer] 

[employer] IM TnIH[employer]agent. 

Results from different 

methodologies should not 

becompared to one another 

as quantitative results 

and URLs mayvary by 

method.





C REACTIVE [id number] 18:45:00* 



                      Test Item  Value      Reference Range Interpretation Comme

[facility]

 

                                                    C REACTIVE PROTEIN 

(test code = CRP) 13.2 MG/L       <5.0            H               **Note change 

in 

REFERENCE RANGE due to 

change in REAGENT.**





PROTHROMBIN [id number] 18:39:00* 



                      Test Item  Value      Reference Range Interpretation Comme

[facility]

 

                                                    PROTHROMBIN TIME 

PATIENT (test code = 

PTP)            11.7 SECONDS    9.3-12.9        N               

 

                                                    INTERNATIONAL NORMAL 

RATIO (test code = 

INR)            1.0             0.8-1.2         N               TARGET INR BY 

INDICATION Indication 

INR1. Prophylaxis of 

venous thrombosis 2.0 

- 3.0 (orthopedic 

surgery), Prophylaxis 

of venous thrombosis 

(other than high-risk 

surgery), Treatment of 

Deep Vein 

Thrombosis/Pulmonary 

Embolism, Prevention 

of systemic embolism - 

Tissue heart valves, 

Acute Myocardial 

Infarction (to prevent 

systemic embolism), 

Valvular heart 

disease, Atrial 

Fibrillation, 

Bileaflet mechanical 

valve in aortic 

position.2. Mechanical 

prosthetic valves 

(high risk), 2.5 - 3.5 

Presence of Lupus 

Anticoagulant or 

Antiphospholipid 

Antibodies, Prevention 

of systemic embolism - 

Acute Myocardial 

Infarction (to prevent 

recurrent infarct).





THROMBOPLASTIN TIME [id number] 18:39:00* 



                      Test Item  Value      Reference Range Interpretation Comme

[facility]

 

                                                    THROMBOPLASTIN TIME 

PARTIAL (test code = 

PTT)            21.9 Seconds    25.0-39.5       L               Therapeutic Rang

e: 

58.8 - 96.0 Seconds 

**** Effective 

10/25/2024 ****





HGBA1C%2025 18:34:00* 



                      Test Item  Value      Reference Range Interpretation Comme

[facility]

 

                      HGBA1C% (test code = HGBA1C%) 5.5 %A1C   4.8-6.0    N     

     





COMP Metabolic Panel ([id number] 02:45:20* 



                      Test Item  Value      Reference Range Interpretation Comme

[facility]

 

                                                    NA (test code = 

7779758296)     139 mmol/L      135-145                         

 

                                                    K (test code = 

8531572821)     4.8 mmol/L      3.5-5.0                         

 

                                                    CL (test code = 

4526804046)     102 mmol/L                                

 

                                                    CO2 TOTAL (test code = 

3765608556)     30 mmol/L       23-31                           

 

                                                    AGAP (test code = 

8178729895)     7               2-16                            

 

                                                    BUN (test code = 

9915424855)     7 mg/dL         7-23                            

 

                                                    GLUCOSE (test code = 

4654771997)     94 mg/dL                                  

 

                                                    CREATININE (test code = 

4761130320)     0.84 mg/dL      0.60-1.25                       

 

                                                    TOTAL BILI (test code = 

6833606737)     0.8 mg/dL       0.1-1.1                         

 

                                                    CALCIUM (test code = 

0642704750)     8.9 mg/dL       8.6-10.6                        

 

                                                    T PROTEIN (test code = 

1502031157)     8.3 g/dL        6.3-8.2         H               

 

                                                    ALBUMIN (test code = 

5807324094)     4.1 g/dL        3.5-5.0                         

 

                                                    ALK PHOS (test code = 

7436423228)     80 U/L                                    

 

                                                    ALTv (test code = 

1742-6)         27 U/L          5-50                            

 

                                                    AST(SGOT) (test code = 

6208529522)     38 U/L          13-40                           

 

                                                    eGFR (test code = 

0236265198)     90.9            mL/min/1.73m2                   

 

                                        TEA (test code = TEA) Association of 

Glomerular Filtration 

Rate (GFR) and Staging 

of Kidney Disease* 

+---------------------

--+-------------------

--+-------------------

------+| GFR 

(mL/min/1.73 m2) ?| 

With Kidney Damage ?| 

?Without Kidney 

Damage+---------------

--------+-------------

--------+-------------

------------+| ?>90 ? 

? ? ? ? ? ? ? ?| 

?Stage one ? ? ? ? ?| 

? Normal ? ? ? ? ? ? ? 

?+--------------------

---+------------------

---+------------------

-------+| ?60-89 ? ? ? 

? ? ? ? ?| ?Stage two 

? ? ? ? ?| ? Decreased 

GFR ? ? ? ? 

+---------------------

--+-------------------

--+-------------------

------+| ?30-59 ? ? ? 

? ? ? ? ?| ?Stage 

three ? ? ? ?| ? Stage 

three ? ? ? ? ? 

+---------------------

--+-------------------

--+-------------------

------+| ?15-29 ? ? ? 

? ? ? ? ?| ?Stage four 

? ? ? ? | ? Stage four 

? ? ? ? ? 

?+--------------------

---+------------------

---+------------------

-------+| ?<15 (or 

dialysis) ? ?| ?Stage 

five ? ? ? ? | ? Stage 

five ? ? ? ? ? 

?+--------------------

---+------------------

---+------------------

-------+ *Each stage 

assumes the associated 

GFR level has been in 

effect for at least 

three months. ?Stages 

1 to 5, with or 

without kidney 

disease, indicate 

chronic kidney 

disease. Notes: 

Determination of 

stages one and two 

(with eGFR 

>59mL/min/1.73 m2) 

requires estimation of 

kidney damage for at 

least three months as 

defined by structural 

or functional 

abnormalities of the 

kidney, manifested by 

either:Pathological 

abnormalities or 

Markers of kidney 

damage (including 

abnormalities in the 

composition of the 

blood or urine or 

abnormalities in 

imaging tests).                                             

 

                                                    Lab Interpretation 

(test code = 94705-5) Abnormal                                        





[facility] with [id number] 01:45:17* 



                      Test Item  Value      Reference Range Interpretation Comme

nts

 

                                                    WBC (test code = 

6690-2)         6.93            See_Comment                     [Automated SIZESEEKER] The 

system which generated 

this result transmitted 

reference range: 4.20 - 

10.70 10*3/?L. The 

reference range was not 

used to interpret this 

result as 

normal/abnormal.

 

                                                    RBC (test code = 

789-8)          5.25            See_Comment                     [Automated SIZESEEKER] The 

system which generated 

this result transmitted 

reference range: 4.26 - 

5.52 10*6/?L. The 

reference range was not 

used to interpret this 

result as 

normal/abnormal.

 

                                                    HGB (test code = 

718-7)          15.9 g/dL       12.2-16.4                       

 

                                                    HCT (test code = 

4544-3)         47.2 %          38.4-49.3                       

 

                                                    MCV (test code = 

787-2)          89.9 fL         81.7-95.6                       

 

                                                    MCH (test code = 

785-6)          30.3 pg         26.1-32.7                       

 

                                                    MCHC (test code = 

786-4)          33.7 g/dL       31.2-35.0                       

 

                                                    RDW-SD (test code 

= 29896-4)      44.1 fL         38.5-51.6                       

 

                                                    RDW-CV (test code 

= 788-0)        13.3 %          12.1-15.4                       

 

                                                    PLT (test code = 

777-3)          260             See_Comment                     [Automated SIZESEEKER] The 

system which generated 

this result transmitted 

reference range: 150 - 

328 10*3/?L. The 

reference range was not 

used to interpret this 

result as 

normal/abnormal.

 

                                                    MPV (test code = 

70230-0)        10.3 fL         9.8-13.0                        

 

                                                    NRBC/100 WBC (test 

code = 4366331321) 0.0             See_Comment                     [Automated me

ssage] The 

system which generated 

this result transmitted 

reference range: 0.0 - 

10.0 /100 WBCs. The 

reference range was not 

used to interpret this 

result as 

normal/abnormal.

 

                                                    NRBC x10^3 (test 

code = 5212359385)                 See_Comment                     [Automated me

ssage] The 

system which generated 

this result transmitted 

reference range: 

10*3/?L. The reference 

range was not used to 

interpret this result 

as normal/abnormal.

 

                                                    GRAN MAT (NEUT) % 

(test code = 

770-8)          65.5 %                                          

 

                                                    IMM GRAN % (test 

code = 1844137848) 0.40 %                                          

 

                                                    LYMPH % (test code 

= 736-9)        18.8 %                                          

 

                                                    MONO % (test code 

= 5905-5)       10.5 %                                          

 

                                                    EOS % (test code = 

713-8)          4.2 %                                           

 

                                                    BASO % (test code 

= 706-2)        0.6 %                                           

 

                                                    GRAN MAT 

x10^3(ANC) (test 

code = 1520689492) 4.54 10*3/uL    1.99-6.95                       

 

                                                    IMM GRAN x10^3 

(test code = 

9072880729)     0.03 10*3/uL    0.00-0.06                       

 

                                                    LYMPH x10^3 (test 

code = 731-0)   1.30 10*3/uL    1.09-3.23                       

 

                                                    MONO x10^3 (test 

code = 742-7)   0.73 10*3/uL    0.36-1.02                       

 

                                                    EOS x10^3 (test 

code = 711-2)   0.29 10*3/uL    0.06-0.53                       

 

                                                    BASO x10^3 (test 

code = 704-7)   0.04 10*3/uL    0.01-0.09                       





[facility] 



                      Test Item  Value      Reference Range Interpretation Comme

nts

 

                                                    TROPONIN I (test code = 

5463193829)     0.007 ng/mL     <=0.034                         

 

                                        TEA (test code = TEA) Reference (Normal)

 

Range (defined by the 

99th percentile 

reference limit): <= 

0.034 ng/mL Note: 

Cardiac troponin 

begins to rise 3-4 

hours after the onset 

of ischemia. Repeat in 

4-6 hours if the 

sample was drawn 

within 3-4 hours of 

the onset of the 

symptom and found 

normal. Diagnosis of 

myocardial injury is 

made with acute 

changes in cTn 

concentrations with at 

least one serial 

sample above the 99th 

percentile upper 

reference limit (URL), 

taken together with 

the patient's clinical 

presentation. Biotin 

has been reported to 

cause a negative bias, 

interpret results 

relative to patient's 

use of biotin.                                              

 

                                                    Lab Interpretation 

(test code = 51753-4) Normal                                          





[id number] 01:38:36* 



                      Test Item  Value      Reference Range Interpretation Comme

nts

 

                                                    NT-proBNP (test code = 

3342034874)     144 pg/mL       <=125           H               

 

                                        TEA (test code = TEA) Biotin has been 

reported to cause a 

negative bias, 

interpret results 

relative to 

patient's use of 

biotin.                                                     

 

                                                    Lab Interpretation (test 

code = 55767-1) Abnormal                                        





[facility] AreaSputum [id number] 17:08:10* 



                      Test Item  Value      Reference Range Interpretation Comme

nts

 

                                                    SPUTUM CULTURE 

(test code = 622-1)                     2+ Respiratory breanna: 

Commensal upper 

respiratory microorganisms 

only.                                                       

 

                                                    Gram stain (test 

code = 664-3)   Few Epithelial cells                                 

 

                                                    TEA (test code = 

TEA)                                    Bacterial pathogens 

associated with lower 

respiratory infections 

were not identified, which 

include Pseudomonas 

aeruginosa and 

Staphylococcus aureus 

(MRSA or MSSA).                                             





[facility] (NA, K, CL, CO2, 
GLUCOSE, BUN, CREATININE, CA)2022-10-30 10:12:31* 



                      Test Item  Value      Reference Range Interpretation Comme

nts

 

                                                    NA (test code = 

3533442414)     138 mmol/L      135-145                         

 

                                                    K (test code = 

1963811981)     5.6 mmol/L      3.5-5.0         H               

 

                                                    CL (test code = 

4229573903)     99 mmol/L                                 

 

                                                    CO2 TOTAL (test code = 

2806527123)     31 mmol/L       23-31                           

 

                                                    AGAP (test code = 

1601410736)                     2-16                            

 

                                                    BUN (test code = 

0128072628)     22 mg/dL        7-23                            

 

                                                    GLUCOSE (test code = 

4712846898)     134 mg/dL                 H               

 

                                                    CREATININE (test code = 

9847979478)     1.00 mg/dL      0.60-1.25                       

 

                                                    CALCIUM (test code = 

8268340707)     9.7 mg/dL       8.6-10.6                        

 

                                                    eGFR (test code = 

9082573410)                     mL/min/1.73m2                   

 

                                        TEA (test code = TEA) Association of 

Glomerular Filtration 

Rate (GFR) and Staging 

of Kidney Disease* 

+---------------------

--+-------------------

--+-------------------

------+| GFR 

(mL/min/1.73 m2) ?| 

With Kidney Damage ?| 

?Without Kidney 

Damage+---------------

--------+-------------

--------+-------------

------------+| ?>90 ? 

? ? ? ? ? ? ? ?| 

?Stage one ? ? ? ? ?| 

? Normal ? ? ? ? ? ? ? 

?+--------------------

---+------------------

---+------------------

-------+| ?60-89 ? ? ? 

? ? ? ? ?| ?Stage two 

? ? ? ? ?| ? Decreased 

GFR ? ? ? ? 

+---------------------

--+-------------------

--+-------------------

------+| ?30-59 ? ? ? 

? ? ? ? ?| ?Stage 

three ? ? ? ?| ? Stage 

three ? ? ? ? ? 

+---------------------

--+-------------------

--+-------------------

------+| ?15-29 ? ? ? 

? ? ? ? ?| ?Stage four 

? ? ? ? | ? Stage four 

? ? ? ? ? 

?+--------------------

---+------------------

---+------------------

-------+| ?<15 (or 

dialysis) ? ?| ?Stage 

five ? ? ? ? | ? Stage 

five ? ? ? ? ? 

?+--------------------

---+------------------

---+------------------

-------+ *Each stage 

assumes the associated 

GFR level has been in 

effect for at least 

three months. ?Stages 

1 to 5, with or 

without kidney 

disease, indicate 

chronic kidney 

disease. Notes: 

Determination of 

stages one and two 

(with eGFR 

>59mL/min/1.73 m2) 

requires estimation of 

kidney damage for at 

least three months as 

defined by structural 

or functional 

abnormalities of the 

kidney, manifested by 

either:Pathological 

abnormalities or 

Markers of kidney 

damage (including 

abnormalities in the 

composition of the 

blood or urine or 

abnormalities in 

imaging tests).                                             

 

                                                    Lab Interpretation 

(test code = 26495-8) Abnormal                                        





[facility]22[facility] 10:12:31* 



                      Test Item  Value      Reference Range Interpretation Comme

nts

 

                      MAGNESIUM (test code = 9476741670) 2.3 mg/dL  1.7-2.4     

          

 

                                                    Lab Interpretation (test cod

e = 

86819-2)        Normal                                          





[facility] with Differential2022-10-30 09:56:07* 



                      Test Item  Value      Reference Range Interpretation Comme

nts

 

                                                    WBC (test code = 

6690-2)                         See_Comment                     [Automated SIZESEEKER] 

The system which 

generated this 

result transmitted 

reference range: 

4.20 - 10.70 

10*3/?L. The 

reference range was 

not used to 

interpret this 

result as 

normal/abnormal.

 

                                                    RBC (test code = 

789-8)                          See_Comment                     [Automated messa

ge] 

The system which 

generated this 

result transmitted 

reference range: 

4.26 - 5.52 

10*6/?L. The 

reference range was 

not used to 

interpret this 

result as 

normal/abnormal.

 

                                                    HGB (test code = 

718-7)          14.8 g/dL       12.2-16.4                       

 

                                                    HCT (test code = 

4544-3)         42.9 %          38.4-49.3                       

 

                                                    MCV (test code = 

787-2)          85.6 fL         81.7-95.6                       

 

                                                    MCH (test code = 

785-6)          29.5 pg         26.1-32.7                       

 

                                                    MCHC (test code = 

786-4)          34.5 g/dL       31.2-35.0                       

 

                                                    RDW-SD (test code = 

24863-0)        46.2 fL         38.5-51.6                       

 

                                                    RDW-CV (test code = 

788-0)          14.6 %          12.1-15.4                       

 

                                                    PLT (test code = 

777-3)                          See_Comment     H               [Automated messa

ge] 

The system which 

generated this 

result transmitted 

reference range: 

150 - 328 10*3/?L. 

The reference range 

was not used to 

interpret this 

result as 

normal/abnormal.

 

                                                    MPV (test code = 

47969-2)        10.7 fL         9.8-13.0                        

 

                                                    NRBC/100 WBC (test 

code = 9573356069)                 See_Comment                     [Automated me

ssage] 

The system which 

generated this 

result transmitted 

reference range: 

0.0 - 10.0 /100 

WBCs. The reference 

range was not used 

to interpret this 

result as 

normal/abnormal.

 

                                                    NRBC x10^3 (test code 

= 5835441848)                   See_Comment                     [Automated messa

ge] 

The system which 

generated this 

result transmitted 

reference range: 

10*3/?L. The 

reference range was 

not used to 

interpret this 

result as 

normal/abnormal.

 

                                                    GRAN MAT (NEUT) % 

(test code = 770-8) 84.1 %                                          

 

                                                    IMM GRAN % (test code 

= 1975088285)   1.20 %                                          

 

                                                    LYMPH % (test code = 

736-9)          10.5 %                                          

 

                                                    MONO % (test code = 

5905-5)         4.0 %                                           

 

                                                    EOS % (test code = 

713-8)          0.0 %                                           

 

                                                    BASO % (test code = 

706-2)          0.2 %                                           

 

                                                    GRAN MAT x10^3(ANC) 

(test code = 

3798101608)     8.16 10*3/uL    1.99-6.95       H               

 

                                                    IMM GRAN x10^3 (test 

code = 2068623803) 0.12 10*3/uL    0.00-0.06       H               

 

                                                    LYMPH x10^3 (test code 

= 731-0)        1.02 10*3/uL    1.09-3.23       L               

 

                                                    MONO x10^3 (test code 

= 742-7)        0.39 10*3/uL    0.36-1.02                       

 

                                                    EOS x10^3 (test code = 

711-2)                          0.06-0.53       L               

 

                                                    BASO x10^3 (test code 

= 704-7)                        0.01-0.09                       

 

                                                    Lab Interpretation 

(test code = 34528-3) Abnormal                                        





[facility]:08:09* 



                                Test Item       Value           Reference 

Range                     Interpretation            Comments

 

                                                    Procalcitonin (test 

code = 8702707003) 0.05 ng/mL      See_Comment                     [Automated 

message] The 

system which 

generated this 

result 

transmitted 

reference 

range: <=0.07. 

The reference 

range was not 

used to 

interpret this 

result as 

normal/abnormal

.

 

                                                    TEA (test code = 

TEA)                                    INTERPRETATION OF 

PROCALCITONIN RESULTS 

IN ADULTS >= 18 YEARS 

OF AGE Initiation and 

discontinuation of 

antibiotics on 

patients with 

suspected or confirmed 

Lower Respiratory 

Tract Infection in 

Adults >= 18 years of 

age. 

+--------------+------

----------+-----------

----+-----------------

-----------+|Procalcit

onin |Interpretation 

?|Antibiotic ? ? 

|Considerations ? ? ? 

? ? ? ? |ng/mL ? ? ? ? 

| ? ? ? ? ? ? ? 

?|recommendation | ? ? 

? ? ? ? ? ? ? ? ? ? ? 

? 

+--------------+------

----------+-----------

----+-----------------

-----------+| <0.1 ? ? 

? ? | Bacterial ? ? ?| 

Strongly ? ? ?| ? ? ? 

? ? ? ? ? ? ? ? ? ? ? 

| ? ? ? ? ? ? ?| 

infection very | 

discouraged ? | 

Overruling: ? ? ? ? ? 

? ? ? | ? ? ? ? ? ? ?| 

unlikely ? ? ? | ? ? ? 

? ? ? ? | ? Clinically 

unstable ? ? ? 

+--------------+------

----------+-----------

----+ ? High risk for 

adverse ? ? | <0.25 ? 

? ? ?| Bacterial ? ? 

?| Discouraged ? | ? 

outcome ? ? ? ? ? ? ? 

? ? | ? ? ? ? ? ? ?| 

infection ? ? ?| ? ? ? 

? ? ? ? | ? SEE 

IMPORTANT NOTE ? ? ? 

?| ? ? ? ? ? ? ?| 

unlikely ? ? ? | ? ? ? 

? ? ? ? | ? ? ? ? ? ? 

? ? ? ? ? ? ? ? 

+--------------+------

----------+-----------

----+-----------------

-----------+| >=0.25 ? 

? ? | Bacterial ? ? ?| 

Encouraged ? ?| ? ? ? 

? ? ? ? ? ? ? ? ? ? ? 

| ? ? ? ? ? ? ?| 

infection ? ? ?| ? ? ? 

? ? ? ? | ? ? ? ? ? ? 

? ? ? ? ? ? ? ? | ? ? 

? ? ? ? ?| likely ? ? 

? ? | ? ? ? ? ? ? ? | 

Consider treatment 

failure 

?+--------------+-----

-----------+----------

-----+ if levels does 

not decrease | >0.5 ? 

? ? ? | Bacterial ? ? 

?| Strongly ? ? ?| 

appropriately ? ? ? ? 

? ? ? | ? ? ? ? ? ? ?| 

infection very | 

encouraged ? ?| ? ? ? 

? ? ? ? ? ? ? ? ? ? ? 

| ? ? ? ? ? ? ?| 

likely ? ? ? ? | ? ? ? 

? ? ? ? | ? ? ? ? ? ? 

? ? ? ? ? ? ? ? 

+--------------+------

----------+-----------

----+-----------------

-----------+ 

Discontinuation of 

antibiotics in 

high-acuity patients 

with suspected or 

confirmed sepsis in 

Adults >= 18 years of 

age. 

+--------------+------

----------+-----------

----+-----------------

-----------+|Procalcit

onin |Interpretation 

?|Antibiotic ? ? 

|Considerations ? ? ? 

? ? ? ? |ng/mL ? ? ? ? 

| ? ? ? ? ? ? ? 

?|recommendation | ? ? 

? ? ? ? ? ? ? ? ? ? ? 

? 

+--------------+------

----------+-----------

----+-----------------

-----------+| <0.25 ? 

? ? ?| Bacterial ? ? 

?| Strongly ? ? ?| ? ? 

? ? ? ? ? ? ? ? ? ? ? 

? | ? ? ? ? ? ? ?| 

infection very | 

discouraged ? | 

Overruling: ? ? ? ? ? 

? ? ? | ? ? ? ? ? ? ?| 

unlikely ? ? ? | ? ? ? 

? ? ? ? | ? Clinically 

unstable ? ? ? 

+--------------+------

----------+-----------

----+ ? High risk for 

adverse ? ? | <0.5 or 

drop | Bacterial ? ? 

?| Discouraged ? | ? 

outcome ? ? ? ? ? ? ? 

? ? | >80% from ? ?| 

infection ? ? ?| ? ? ? 

? ? ? ? | ? SEE 

IMPORTANT NOTE ? ? ? 

?| highest PCT ?| 

unlikely ? ? ? | ? ? ? 

? ? ? ? | ? ? ? ? ? ? 

? ? ? ? ? ? ? ? | 

level ? ? ? ?| ? ? ? ? 

? ? ? ?| ? ? ? ? ? ? ? 

| ? ? ? ? ? ? ? ? ? ? 

? ? ? ? 

+--------------+------

----------+-----------

----+-----------------

-----------+| >=0.5 ? 

? ? ?| Bacterial ? ? 

?| Encouraged ? ?| ? ? 

? ? ? ? ? ? ? ? ? ? ? 

? | ? ? ? ? ? ? ?| 

infection ? ? ?| ? ? ? 

? ? ? ? | ? ? ? ? ? ? 

? ? ? ? ? ? ? ? | ? ? 

? ? ? ? ?| likely ? ? 

? ? | ? ? ? ? ? ? ? | 

Consider treatment 

failure 

?+--------------+-----

-----------+----------

-----+ if levels does 

not decrease | >1.0 ? 

? ? ? | Bacterial ? ? 

?| Strongly ? ? ?| 

appropriately ? ? ? ? 

? ? ? | ? ? ? ? ? ? ?| 

infection very | 

encouraged ? ?| ? ? ? 

? ? ? ? ? ? ? ? ? ? ? 

| ? ? ? ? ? ? ?| 

likely ? ? ? ? | ? ? ? 

? ? ? ? | ? ? ? ? ? ? 

? ? ? ? ? ? ? ? 

+--------------+------

----------+-----------

----+-----------------

-----------+ 

Percentage of drop of 

Procalcitonin 

calculation for 

Discontinuation of 

antibiotics in 

high-acuity patients 

with suspected or 

confirmed sepsis in 

Adults >= 18 years of 

age. ? ? ? ? ? ? ? ? ? 

? ? Procalcitonin 

highest{}-Procalcitoni

n current{}Delta 

Procalcitonin = 

______________________

______________________

___ x100% ? ? ? ? ? ? 

? ? ? ? ? ? ? ? ? ? 

Procalcitonin current 

{} IMPORTANT NOTE: 

Procalcitonin may be 

elevated without 

bacterial infection by 

physiologic stress 

related to trauma, 

burns, chronic 

dialysis, metastatic 

cancer, surgery in the 

past seven days, 

malaria, some fungal 

infections, and some 

forms of vasculitis. 

The interpretation 

algorithm may not 

apply to patients with 

immunosuppression 

(equivalent of >10 mg 

of prednisone daily), 

HIV with CD4 cell 

count < 350 cells/mm3, 

active malignancy on 

systemic chemotherapy, 

solid organ transplant 

or hematopoietic stem 

cell transplantation, 

or hospital acquired 

pneumonia. 

Additionally, some 

clinical trials of 

procalcitonin have 

excluded patients with 

shock requiring 

vasopressor use, acute 

respiratory failure 

requiring mechanical 

ventilation, or those 

with known lung 

abscess/empyema. For 

further information 

please refer 

to:[email or web address]

[email or web address]

p                                                           

 

                                                    Lab Interpretation 

(test code = 

53286-9)        Normal                                          





[facility] 



                                Test Item       Value           Reference 

Range                     Interpretation            Comments

 

                                                    TROPONIN I (test 

code = 4418736806) 0.003 ng/mL     See_Comment                     [Automated 

message] The 

system which 

generated this 

result 

transmitted 

reference range: 

<=0.034. The 

reference range 

was not used to 

interpret this 

result as 

normal/abnormal.

 

                                                    TEA (test code = 

TEA)                                    Reference (Normal) 

Range (defined by 

the 99th percentile 

reference limit): <= 

0.034 ng/mL Note: 

Cardiac troponin 

begins to rise 3-4 

hours after the 

onset of ischemia. 

Repeat in 4-6 hours 

if the sample was 

drawn within 3-4 

hours of the onset 

of the symptom and 

found normal. 

Diagnosis of 

myocardial injury is 

made with acute 

changes in cTn 

concentrations with 

at least one serial 

sample above the 

99th percentile 

upper reference 

limit (URL), taken 

together with the 

patient's clinical 

presentation. Biotin 

has been reported to 

cause a negative 

bias, interpret 

results relative to 

patient's use of 

biotin.                                                     

 

                                                    Lab Interpretation 

(test code = 

10364-8)        Normal                                          





[facility]. METABOLIC PANEL (05632)2022-10-29 
14:30:18* 



                      Test Item  Value      Reference Range Interpretation Comme

nts

 

                                                    NA (test code = 

6292705036)     140 mmol/L      135-145                         

 

                                                    K (test code = 

1236750258)     5.2 mmol/L      3.5-5.0         H               

 

                                                    CL (test code = 

6231181533)     100 mmol/L                                

 

                                                    CO2 TOTAL (test code = 

2181574712)     29 mmol/L       23-31                           

 

                                                    AGAP (test code = 

0781648484)                     2-16                            

 

                                                    BUN (test code = 

2640729094)     7 mg/dL         7-23                            

 

                                                    GLUCOSE (test code = 

7336487057)     120 mg/dL                 H               

 

                                                    CREATININE (test code = 

2188988997)     0.74 mg/dL      0.60-1.25                       

 

                                                    TOTAL BILI (test code = 

5783684286)     1.0 mg/dL       0.1-1.1                         

 

                                                    CALCIUM (test code = 

0005471726)     9.9 mg/dL       8.6-10.6                        

 

                                                    T PROTEIN (test code = 

2690302650)     8.7 g/dL        6.3-8.2         H               

 

                                                    ALBUMIN (test code = 

0221412735)     4.4 g/dL        3.5-5.0                         

 

                                                    ALK PHOS (test code = 

8812841245)     103 U/L                                   

 

                                                    ALTv (test code = 

1742-6)         22 U/L          5-50                            

 

                                                    AST(SGOT) (test code = 

2248445467)     31 U/L          13-40                           

 

                                                    eGFR (test code = 

0623097158)                     mL/min/1.73m2                   

 

                                        TEA (test code = TEA) Association of 

Glomerular Filtration 

Rate (GFR) and Staging 

of Kidney Disease* 

+---------------------

--+-------------------

--+-------------------

------+| GFR 

(mL/min/1.73 m2) ?| 

With Kidney Damage ?| 

?Without Kidney 

Damage+---------------

--------+-------------

--------+-------------

------------+| ?>90 ? 

? ? ? ? ? ? ? ?| 

?Stage one ? ? ? ? ?| 

? Normal ? ? ? ? ? ? ? 

?+--------------------

---+------------------

---+------------------

-------+| ?60-89 ? ? ? 

? ? ? ? ?| ?Stage two 

? ? ? ? ?| ? Decreased 

GFR ? ? ? ? 

+---------------------

--+-------------------

--+-------------------

------+| ?30-59 ? ? ? 

? ? ? ? ?| ?Stage 

three ? ? ? ?| ? Stage 

three ? ? ? ? ? 

+---------------------

--+-------------------

--+-------------------

------+| ?15-29 ? ? ? 

? ? ? ? ?| ?Stage four 

? ? ? ? | ? Stage four 

? ? ? ? ? 

?+--------------------

---+------------------

---+------------------

-------+| ?<15 (or 

dialysis) ? ?| ?Stage 

five ? ? ? ? | ? Stage 

five ? ? ? ? ? 

?+--------------------

---+------------------

---+------------------

-------+ *Each stage 

assumes the associated 

GFR level has been in 

effect for at least 

three months. ?Stages 

1 to 5, with or 

without kidney 

disease, indicate 

chronic kidney 

disease. Notes: 

Determination of 

stages one and two 

(with eGFR 

>59mL/min/1.73 m2) 

requires estimation of 

kidney damage for at 

least three months as 

defined by structural 

or functional 

abnormalities of the 

kidney, manifested by 

either:Pathological 

abnormalities or 

Markers of kidney 

damage (including 

abnormalities in the 

composition of the 

blood or urine or 

abnormalities in 

imaging tests).                                             

 

                                                    Lab Interpretation 

(test code = 97680-2) Abnormal                                        





[facility] WITH DIFF2022-10-29 14:18:56* 



                      Test Item  Value      Reference Range Interpretation Comme

nts

 

                                                    WBC (test code = 

6690-2)                         See_Comment                     [Automated SIZESEEKER] 

The system which 

generated this 

result transmitted 

reference range: 

4.20 - 10.70 

10*3/?L. The 

reference range was 

not used to 

interpret this 

result as 

normal/abnormal.

 

                                                    RBC (test code = 

789-8)                          See_Comment                     [Automated SIZESEEKER] 

The system which 

generated this 

result transmitted 

reference range: 

4.26 - 5.52 

10*6/?L. The 

reference range was 

not used to 

interpret this 

result as 

normal/abnormal.

 

                                                    HGB (test code = 

718-7)          14.6 g/dL       12.2-16.4                       

 

                                                    HCT (test code = 

4544-3)         42.7 %          38.4-49.3                       

 

                                                    MCV (test code = 

787-2)          87.0 fL         81.7-95.6                       

 

                                                    MCH (test code = 

785-6)          29.7 pg         26.1-32.7                       

 

                                                    MCHC (test code = 

786-4)          34.2 g/dL       31.2-35.0                       

 

                                                    RDW-SD (test code = 

43499-1)        47.8 fL         38.5-51.6                       

 

                                                    RDW-CV (test code = 

788-0)          14.8 %          12.1-15.4                       

 

                                                    PLT (test code = 

777-3)                          See_Comment                     [Automated messa

ge] 

The system which 

generated this 

result transmitted 

reference range: 

150 - 328 10*3/?L. 

The reference range 

was not used to 

interpret this 

result as 

normal/abnormal.

 

                                                    MPV (test code = 

36895-8)        9.7 fL          9.8-13.0        L               

 

                                                    NRBC/100 WBC (test 

code = 9876420307)                 See_Comment                     [Automated me

ssage] 

The system which 

generated this 

result transmitted 

reference range: 

0.0 - 10.0 /100 

WBCs. The reference 

range was not used 

to interpret this 

result as 

normal/abnormal.

 

                                                    NRBC x10^3 (test code 

= 7897943429)                   See_Comment                     [Automated messa

ge] 

The system which 

generated this 

result transmitted 

reference range: 

10*3/?L. The 

reference range was 

not used to 

interpret this 

result as 

normal/abnormal.

 

                                                    GRAN MAT (NEUT) % 

(test code = 770-8) 77.2 %                                          

 

                                                    IMM GRAN % (test code 

= 7981205979)   0.50 %                                          

 

                                                    LYMPH % (test code = 

736-9)          14.0 %                                          

 

                                                    MONO % (test code = 

5905-5)         7.6 %                                           

 

                                                    EOS % (test code = 

713-8)          0.3 %                                           

 

                                                    BASO % (test code = 

706-2)          0.4 %                                           

 

                                                    GRAN MAT x10^3(ANC) 

(test code = 

2025814703)     8.15 10*3/uL    1.99-6.95       H               

 

                                                    IMM GRAN x10^3 (test 

code = 1402442545) 0.05 10*3/uL    0.00-0.06                       

 

                                                    LYMPH x10^3 (test code 

= 731-0)        1.48 10*3/uL    1.09-3.23                       

 

                                                    MONO x10^3 (test code 

= 742-7)        0.80 10*3/uL    0.36-1.02                       

 

                                                    EOS x10^3 (test code = 

711-2)          0.03 10*3/uL    0.06-0.53       L               

 

                                                    BASO x10^3 (test code 

= 704-7)        0.04 10*3/uL    0.01-0.09                       

 

                                                    Lab Interpretation 

(test code = 38497-6) Abnormal                                        





[facility]



Notes





                          Date/Time    Note         Provider     Source

 

                                        2025 07:38:00 

[facility] ([facility])

Discharge Summary

REPORT#:[id number] REPORT STATUS: Signed

REPORT INITIALIZATION DATE:25 TIME: 738



PATIENT: EPIFANIO QUINONES UNIT #: [id number]

ACCOUNT#: [id number] ROOM/BED: [place] [address]

: [birth year] AGE: 69 SEX: M ATTEND: 

Bong Corey MD

ADM DT: 25 AUTHOR: Jesu Hartman NP

REPT SERVICE DT/TIME: 25 07

* ALL edits or amendments must be made on the 

electronic/computer document *





PCP



PCP

PCP:

PCP: No Primary or Family Physician



Discharge to: home





General Information

Date of admission:

Observation Start Date:

Date of admission: 25



Discharge date: 25

Discharge diagnosis:

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

Acute COPD excerbation.

SOB due to CAD.

Hyperkalemia.

HX of COPD and PNA.

Hospital course:

69-year-old gentleman with a past medical history 

of COPD, on home oxygen, 4 L

who presented to the hospital at [place] for 

complaints of worsening

shortness of breath productive cough,. Patient 

was treated for COPD

exacerbation and pneumonia with antibiotics. He 

underwent heart catheterization

at [facility] multivessel coronary 

artery disease. He was admitted

for CV surgery eval. Patient was seen by cv 

surgery. he had a CABG work up

however in high risk PCI, he was recommended for 

PCI with Impella. He had a

stent in placed. He will go for Effeint and ASA.

Consultants: cardiology, cardiovascular surgery

Pt. condition on discharge: improved, stable

Allergies:

Allergies:

No Known Allergies (Coded, 25)







Med Rec



Med Rec

Discharge meds:

Continue taking these medications:

Fluticasone/Umeclidin/Vilanter (TRELEGY ELLIPTA 

200-62.5-25) 200-62.5 BLST.W.DEV

1 PUFF INHALATION DAILY.



ALBUTEROL (ACCUNEB) 1.25 MG/3 ML NEB

1.25 MILLIGRAM INHALATION RT - EVERY 4 HOURS AS 

NEEDED. as needed for copd



IPRATROPIUM/ALBUTEROL (DUONEB 0.5 MG-3/3ML) 0.5 

MG-3 MG (2.5 MG BASE)/3 ML NEB

3 MILLILITERS NEB RT - EVERY 4 HOURS AS NEEDED. 

as needed for copd



Start taking the following new medications:

PRASUGREL (EFFIENT) 10 MG TAB

10 MILLIGRAM ORAL DAILY.

Qty = 30

No Refills



ATORVASTATIN (LIPITOR) 40 MG TAB

40 MILLIGRAM ORAL 2100

Qty = 30

No Refills



METOPROLOL SUCC XL (TOPROL XL) 25 MG TAB.SR.24H

50 MILLIGRAM ORAL DAILY.

Qty = 30

No Refills



VALSARTAN (DIOVAN) 160 MG TAB

160 MILLIGRAM ORAL DAILY.

Qty = 30

No Refills



ASPIRIN EC (ECOTRIN) 81 MG TAB.EC

81 MILLIGRAM ORAL DAILY.

Qty = 30

No Refills



amLODIPine (NORVASC) 5 MG TAB

5 MILLIGRAM ORAL DAILY.

Qty = 30

No Refills



predniSONE (predniSONE) 20 MG TAB

20 MILLIGRAM ORAL DAILY.

Qty = 5

No Refills







Objective

VS/I O

Last Documented:

Result Date Time

Pulse Ox 96 653

B/P 139/67 653

B/P Mean 0.0 653

O2 Delivery Nasal cannula 653

Temp  36.7 653

Pulse 94 653

Resp 22 653

O2 Flow Rate  4 2000

FiO2 32  1611



24 hour I O ending at 0700:

 0700  1900

Intake Total 350

Output Total 1300

Balance 350 -1300



Intake, Oral 350

Output, Urine 1300

Patient 67.2 kg 74 kg

Weight

Weight Standing scale Stated/Reported

Measurement

Method



PATIENT WEIGHT:



Weight (lb): 148

Weight (oz): 2.41

Weight (kg): 67.200





Discharge Instructions



PCP

)( Discharge to: Home/Self Care



Discharge Instructions

Additional Discharge Routines: PCP Follow-Up, 

Consultant Follow-Up

)( Diet: Cardiac

)( Activity: Resume Normal Activity



Follow-up Appointments

PCP follow up:

PCP:

No Primary or Family Physician

PCP follow up timeframe: In 2-3 weeks

Consulting provider 1:

Provider 1:

Marcos Buck MD

Specialty: CardiologyInterventional

Phone: [phone]

Consult follow up timeframe: In 2-3 weeks



Electronically Signed by Jesu Hartman NP on 

25 at 0858

Electronically Signed by Amelia Justice MD on 

25 at 1254



RPT #:[id number]

***END OF REPORT***                                 [facility]

 

                                        2025 06:53:00 [id number] [place] [address]





PATIENT NAME: EPIFANIO QUINONES ADMIT DATE: 

25

ACCOUNT NO: [id number] ROOM NO: G.DCCVN1

MEDICAL RECORD NO: [id number] AGE: 69

REPORT TYPE: eELECTROCARDIOGRAM REPORT SEX: M



ADMITTING PHYSICIAN:Bong Corey MD

ATTENDING PHYSICIAN:Bong Corey MD





Order:

[id number]

Test Reason : POST OP

Test Date/Time Stamp:

Thu Mar 06 2025 06:53:47

Blood Pressure : ***/*** mmHG

Vent. Rate : 085 BPM Atrial Rate : 085 BPM

P-R Int : 120 ms QRS Dur : 082 ms

QT Int : 338 ms P-R-T Axes : 086 085 040 degrees

QTc Int : 402 ms



Normal sinus rhythm

Right atrial enlargement

Nonspecific ST and T wave abnormality

Abnormal ECG

When compared with ECG of 05-MAR-2025 10:26,

No changes

Confirmed by MD DEAN GERARD () on 3/7/2025 

1:11:02 PM



Referred By: Self Referred Confirmed by:THUAN DEAN MD











Electronically Signed by Thuan Dean MD on 

25 at 1311



































PATIENT NAME: EPIFANIO QUINONES ACCOUNT #: 

[id number]                                        [facility]

 

                                        2025 05:55:00 

[facility] ([facility])

Hospitalist Progress Note

REPORT#:[id number]  REPORT STATUS: Signed

REPORT INITIALIZATION DATE:25 TIME: 555



PATIENT: EPIFANIO QUINONES UNIT #: [id number]

ACCOUNT#: [id number] [facility]

: 53 AGE: 69 SEX: M ATTEND: 

Bong Corey MD

ADM DT: 25 AUTHOR: Jesu Hartman NP

REPT SERVICE DT/TIME: 25

* ALL edits or amendments must be made on the 

electronic/computer document *





Subjective

Chief complaint:

S/p PCI and stent placement.

No CP, fever, chills.

NO N/v/d.

BP and HR stable.



Review of Systems

Constitutional:

Reports: fatigue, generalized weakness.

Allergy/Immun:

Denies: anaphylaxis, rhinorrhea, sneezing.

Respiratory:

Denies: CARRERO (dyspnea on exertion), parox 

nocturnal dyspnea, pleuritic pain,

pneumonia, SOB.

Cardiovascular:

Denies: CARRERO (dyspnea on exertion), orthopnea, 

palpitations.

GI:

Denies: abdominal pain, dysphagia, GERD, 

hematochezia, melena.

:

Denies: flank pain, nocturia, penile lesion.

Musculoskeletal:

Denies: extremity pain, joint pain, joint 

swelling, myalgias.

Neuro:

Denies: confusion, gait problem, lightheaded, 

slurred speech.



Objective



General

VS/I O:

Vital Signs:

Date Time Temp Pulse Resp B/P B/P Pulse O2 O2 

Flow FiO2

Mean Ox Delivery Rate

 0653 36.7 94 22 139/67 0.0 96 Nasal

cannula

 0417 36.8 80 19 139/65 0.0 99 Room air

 2326 36.9 89 19 133/59 0.0 98 Nasal

cannula

2000 Nasal 4

cannula

 195  Nasal 3

cannula

 1906 36.8 102 18 133/66 0.0 95 Nasal

cannula

 1800 96 119/67 89 100

 1707 37.2 86 20 119/73 0.0 100 Nasal

cannula

 1700 89 119/73 89 99

 1611 98 Nasal 3 32

cannula

 1600 73 102 124/60 84 93

/ 1400 87 74 97

/ 1352  Nasal 4

cannula

 1300 71 141/76 104 100

/ 1229 37.0

 1200 69 19 139/73 101 99

03/ 1100 73 29 102/69 81 98

/ 1038 Nasal 4

cannula



24 hour I O ending at 0700:

 0700  1900

Intake Total 350

Output Total 1300

Balance 350 -1300



Intake, Oral 350

Output, Urine 1300

Patient 67.2 kg 74 kg

Weight

Weight Standing scale Stated/Reported

Measurement

Method



PATIENT WEIGHT:



Weight (lb): 148

Weight (oz): 2.41

Weight (kg): 67.200



Medications:

Active Meds + DC'd Last 24 Hrs

Prasugrel (EFFIENT) 10 MG DAILY PO

Prednisone (predniSONE) 40 MG DAILY 0600 PO

Prasugrel (EFFIENT) 30 MG ONCE ONE PO (DC)

Atropine Sulfate (ATROPINE SULFATE 0.1MG/ML SYR) 

0.5 MG ASDIR PRN IV

Sodium Chloride (SODIUM CHLORIDE 0.9%) 1,000 ML 

.[id number] ONE IV (DC)

Sodium Chloride (SODIUM CHLORIDE 0.9%) 500 ML 

ASDIR PRN IV

Clopidogrel Bisulfate (CLOPIDOGREL BISULFATE) 0 

.STK-MED ONE .ROUTE (DC)



Sodium Chloride (SODIUM CHLORIDE 0.9%) 250 ML 

.STK-MED ONE IV (DC)

Aminophylline (Aminophylline) 0 .STK-MED ONE IV 

(DC)

Sodium Chloride (SODIUM CHLORIDE 0.9%) 250 ML 

.STK-MED ONE IV (DC)

Adenosine (ADENOCARD I.V.)  0 .STK-MED ONE IV 

(DC)

Phenylephrine HCl (Phenylephrine  mcg/5 mL 

Inj) 0 .STK-MED ONE

.ROUTE (DC)

Heparin Sodium/Sodium Chloride (HEPARIN 2,000 

UNITS/NS 1,000mL) 1,000 ML

.STK-MED ONE IV (DC)

Iopamidol (ISOVUE-370 200ML) 0 .STK-MED ONE IV 

(DC)

Heparin Sodium (HEPARIN SODIUM) 0 .STK-MED ONE 

.ROUTE (DC)

Methylprednisolone Sodium Succinate (Solu-Medrol 

40 MG Vial) 40 MG Q12H IV

(DC)

Clopidogrel Bisulfate (Plavix) 75 MG DAILY PO 

(DC)

Metoprolol Succinate (TOPROL XL) 50 MG  DAILY PO

Amlodipine Besylate (NORVASC) 5 MG BEDTIME PO

Hydralazine HCl (APRESOLINE) 10 MG Q6H PRN PRN IV

Valsartan (DIOVAN 160MG TAB) 160 MG DAILY PO

Heparin Sodium (HEPARIN 5000 UNITS/ML) 5,000 UNIT 

Q12HR SUBQ

Fluticasone Propionate (Flonase Nasal [place]) 2 

SPRAY DAILY NASAL (CKD)



Atorvastatin Calcium (LIPITOR) 40 MG 2100 PO

Levalbuterol HCl (Levalbuterol 1.25 mg/3 mL NEB) 

1.25 MG RTQ6H INH

Sterile Water (WATER FOR INJECTION) 1 ML ASDIR 

PRN IV

Ipratropium Bromide (ATROVENT) 500 MCG RTQ6H INH

Levalbuterol HCl (Levalbuterol 1.25 mg/3 mL NEB) 

1.25 MG RTQ4H PRN PRN INH



Sterile Water (WATER FOR INJECTION) 1 ML ASDIR 

PRN IV

Budesonide (PULMICORT RESPULES) 0.5 MG RTBID INH

Aspirin (ASPIRIN) 81 MG DAILY PO





Dietitian nutrition assessment

The data set between the solid lines has been 

imported from the dietitian's

assessment.

_________________________________________________

________________



BMI Calculated: 25.0

Nutrition related diagnosis:

Nutrition diagnosis details:

Nutrition problem:

Nutrition etiology:

Nutrition signs and symptoms:

Nutrition prescription:

Dietitian name:

Assessment completed:

_________________________________________________

________________







Physical Exam

General appearance: alert, awake

Head/Eyes: atraumatic, normocephalic, PERRLA

Neck: full range of motion, supple/no 

meningismus, no bruit/NL carotids, no JVD

Cardiovascular: normal capillary refill, normal 

heart sounds, regular rate

rhythm

Respiratory: aerating well

Abdomen: non-tender, normal bowel sounds, soft

Genitourinary: no bladder distention, no flank 

pain, no urinary catheter

Extremities: no calf tenderness, no clubbing, no 

cyanosis

Musculoskeletal: no CVA tenderness, no muscle 

spasm

Neuro/CNS: alert, oriented X 3, CNII-XII intact



Results

Findings/Data:

Laboratory Tests



0944 0905 0844 0812 0800

Coagulation

Activated Coag Time (SECONDS) 291 313 324 233 211





Results: vital signs reviewed, vital signs 

stable, current med profile rev'd



Treatment Prophylaxis



Treatment Prophylaxis

Oxygen: room air



Diagnosis, Assessment Plan

Hospital course to date:

Assessment and plan:



Multivessel CAD.

Acute COPD excerbation.

SOB due to CAD.

Hyperkalemia.

HX of COPD and PNA.



Plan:



CVN1.

S/p PCI and stent placement.

Pulmonary for COPD.

Monitor respirartory status, breathing tx, o2 

support.

IS.

Pain meds.

Antiemetics.

Cough meds.

Follow labs and replace as needed.

Continue home meds.

Monitor.





Orders:



Procedure Date/time Status

Discharge Order 736 Active

Discharge Follow Up 736 Active





Code status: full code

Plan discussed with: patient, admitting 

physician, consultants, nurse



Electronically Signed by Jesu Hartman NP on 

25 at 0738

Electronically Signed by Amelia Justice MD on 

25 at 1254



RPT #:[id number]

***END OF REPORT***                                 [facility]

 

                                        2025 15:35:00 

[facility]

Pulmonology Progress Note

REPORT#:[id number] REPORT STATUS: Signed

REPORT INITIALIZATION DATE:25 TIME: 



PATIENT: EPIFANIO QUINONES UNIT #: [id number]

ACCOUNT#: [id number] ROOM/BED: [id number]

: 53 AGE: 69 SEX: M ATTEND: 

Bong Corey MD

ADM DT: 25 AUTHOR: Lena Henry MD

REPT SERVICE DT/TIME: 25 153

* ALL edits or amendments must be made on the 

electronic/computer document *





Subjective

Chief complaint:

SOB

HPI:

70 yo M with h/o COPD, chronic hypoxia on home 

oxygen who presented for [facility] in

[place] with worsening SOB and productive 

cough. She was treated there for

COPD exacerbation and pneumonia. She had LHC 

which showed mvd. She was

transfered to [facility] for further care. She has had 

productive cough with yellow

sputum. She is a chronic smoker - 1 pack per day. 

Pt has required BIPAP during

hospital course.

Comments:

no adverse events overnight

PCI planned for today



ROS

All systems rev neg: except as marked



Review of Systems



ROS

All systems rev neg: except as marked



Objective



General

VS/I O:

Last Documented:

Result Date Time

Pulse Ox  97  1400

Pulse 87  1400

Resp 74  1400

O2 Delivery Nasal cannula  1352

O2 Flow Rate 4  1352

B/P 141/76  1300

B/P Mean 104  1300

Temp 98.6  1229

FiO2 36  1928



24 hour I O ending at 0700:

 0700  1900

Intake Total 600

Output Total 800 500

Balance  -800 100



Intake, Oral 600

Output, Urine 800 500

Weight  Standing scale

Measurement

Method



PATIENT WEIGHT:



Weight (lb): 163

Weight (oz): 2.27

Weight (kg): 74.000



Medications:

Active Meds + DC'd Last 24 Hrs

Prasugrel (EFFIENT) 10 MG DAILY PO

Prasugrel (EFFIENT) 30 MG ONCE ONE PO (DC)

Atropine Sulfate (ATROPINE SULFATE 0.1MG/ML SYR) 

0.5 MG ASDIR PRN IV

Sodium Chloride (SODIUM CHLORIDE 0.9%) 1,000 ML 

.[id number] ONE IV

Sodium Chloride (SODIUM CHLORIDE 0.9%) 500 ML 

ASDIR PRN IV

Clopidogrel Bisulfate (CLOPIDOGREL BISULFATE) 0 

.STK-MED ONE .ROUTE (DC)



Sodium Chloride (SODIUM CHLORIDE 0.9%) 250 ML 

.STK-MED ONE IV (DC)

Aminophylline (Aminophylline)  0 .STK-MED ONE IV 

(DC)

Sodium Chloride (SODIUM CHLORIDE 0.9%) 250 ML 

.STK-MED ONE IV (DC)

Adenosine (ADENOCARD I.V.) 0 .STK-MED ONE IV (DC)

Phenylephrine HCl (Phenylephrine  mcg/5 mL 

Inj) 0 .STK-MED ONE

.ROUTE (DC)

Heparin Sodium/Sodium Chloride (HEPARIN 2,000 

UNITS/NS 1,000mL) 1,000 ML

.STK-MED ONE IV (DC)

Iopamidol (ISOVUE-370 200ML) 0 .STK-MED ONE IV 

(DC)

Heparin Sodium (HEPARIN SODIUM) 0 .STK-MED ONE 

.ROUTE (DC)

Heparin Sodium (HEPARIN SODIUM) 0 .STK-MED ONE 

.ROUTE (DC)

Heparin Sodium/Sodium Chloride (HEPARIN 2,000 

UNITS/NS 1,000mL) 1,000 ML

.STK-MED ONE IV (DC)

Heparin Sodium/Sodium Chloride (HEPARIN 1,000 

UNITS/NS 500ML) 500 ML  .STK-

MED ONE IV (DC)

Lidocaine HCl (LIDOCAINE HCL/PF) 0 .STK-MED ONE 

.ROUTE (DC)

Fentanyl Citrate (SUBLIMAZE) 0 .STK-MED ONE 

.ROUTE (DC)

Midazolam HCl (VERSED) 0 .STK-MED ONE .ROUTE (DC)

Nitroglycerin/Dextrose (NITROGLYCERIN 

50,000MCG/D5W 250ML) 250 ML .STK-MED

ONE IV (DC)

Aspirin (ASPIRIN) 0 .STK-MED ONE PO  (DC)

Clopidogrel Bisulfate (Plavix) 0 .STK-MED ONE PO 

(DC)

Methylprednisolone Sodium Succinate (Solu-Medrol 

40 MG Vial) 40 MG Q12H IV



Clopidogrel Bisulfate (Plavix) 75 MG DAILY PO 

(DC)

Metoprolol Succinate (TOPROL XL) 50 MG DAILY PO

Amlodipine Besylate (NORVASC) 5 MG BEDTIME PO

Hydralazine HCl (APRESOLINE) 10 MG Q6H PRN PRN IV

Valsartan (DIOVAN 160MG TAB) 160 MG DAILY PO

Heparin Sodium (HEPARIN 5000 UNITS/ML) 5,000 UNIT 

Q12HR SUBQ

Fluticasone Propionate (Flonase Nasal [place]) 2 

SPRAY DAILY NASAL (CKD)



Atorvastatin Calcium (LIPITOR) 40 MG 2100 PO

Levalbuterol HCl (Levalbuterol 1.25 mg/3 mL NEB) 

1.25 MG RTQ6H INH

Sterile Water (WATER FOR INJECTION) 1 ML ASDIR 

PRN IV

Ipratropium Bromide (ATROVENT) 500 MCG RTQ6H INH

Levalbuterol HCl (Levalbuterol 1.25 mg/3 mL NEB) 

1.25 MG RTQ4H PRN PRN INH



Sterile Water (WATER FOR INJECTION) 1 ML ASDIR 

PRN IV

Budesonide (PULMICORT RESPULES) 0.5 MG RTBID INH

Aspirin (ASPIRIN) 81 MG  DAILY PO







Physical Exam

Head/eyes: atraumatic, normocephalic

ENT:

ENT: normal dentition, normal nose

Neck: full range of motion, non-tender, no 

lymphadenopathy

Cardiovascular: normal heart sounds, normal 

S1/S2, no murmur

Respiratory/chest: on oxygen, wheezing, aerating 

well, clear to auscultation,

symmetric expansion

Abdomen: soft, non-tender, normal bowel sounds

Genitourinary: no bladder distention, no flank 

pain

Extremities: moves all, normal capillary refill

Musculoskeletal: full range of motion, normal 

inspection, painless range of

motion

Neuro/CNS: alert, oriented X 3, CNII-XII intact

Skin: dry, intact





Diagnosis, Assessment Plan

Free Text A P:

Assessment:

Acute on chronic hypoxic and hypercarbic 

respiratory failure

COPD with acute exacerbation

Acute bronchitis - Pseudomonas infection

Multivessel CAD s/p PCI

Heavy tobacco smoker

Marijuana use



Work-up/Imaging - personally reviewed

CT Chest, 25 - centrilobular emphysema with 

apical predominance, SOILA

scarring, bronchial wall thickening

CXR, 25 - hyperinflation, prominent vascular 

markings

BNP elevated - 174

COVID and Flu negative



Recommendations:

Contnue steroids, weaned to IV Solumedrol 40mg 

q12h; will transition to PO

prendisone 40mg daily from tomorrow

Bronchodilators scheduled, Atrovent and 

Levalbuterol

As needed bronchodilators

Inhaled corticosteroid

Continue antibiotics - Cefepime x 7d, s/p 

azithromycin x 3d

Sputum culture- Psuedomonas

Continue supplemental oxygen, wean to maintain O2 

sat 90-94%

BIPAP while sleeping and as needed

Smoking cessation coounseling provided

DVT ppx - heparin



Thank you for this consult. Will continue to 

follow patient.



Lena Henry MD

Pulmonary Medicine



Electronically Signed by Lena Henry MD on 

25 at 1512



RPT #:[id number]

***END OF REPORT***                                 [facility]

 

                                        2025 10:26:00 [id number] [place] [address]





PATIENT NAME: EPIFANIO QUINONES ADMIT DATE: 

25

ACCOUNT NO: [id number]  ROOM NO: [facility]

MEDICAL RECORD NO: [id number] AGE: 69

REPORT TYPE: eELECTROCARDIOGRAM REPORT SEX: M



ADMITTING PHYSICIAN:Bong Corey MD

ATTENDING PHYSICIAN:Bong Corey MD





Order:

[id number]

Test Reason : PCI

Test Date/Time Stamp:

Wed Mar 05 2025 10:26:52

Blood Pressure : ***/*** mmHG

Vent. Rate : 078 BPM Atrial Rate : 078 BPM

P-R Int : 130 ms QRS Dur : 078 ms

QT Int : 358 ms P-R-T Axes : 084 078 049 degrees

QTc Int : 408 ms



Normal sinus rhythm

Right atrial enlargement

Anterior infarct , possibly acute

** ** ACUTE MI / STEMI ** **

Abnormal ECG

No previous ECGs available

Confirmed by MD DEAN GERARD () on 3/5/2025 

5:27:46 PM



Referred By: Bong Corey Confirmed by:THUAN DEAN MD











Electronically Signed by Thuan Dean MD on 

25 at 1727



































PATIENT NAME: EPIFANIO QUINONES  ACCOUNT #: 

[id number]                                        [facility]

 

                                        2025 10:13:00 

[facility]

Hospitalist Progress Note

REPORT#:[id number] REPORT STATUS: Signed

REPORT INITIALIZATION DATE:25 TIME: 1013



PATIENT: EPIFANIO QUINONES UNIT #: [id number]

ACCOUNT#: [id number] ROOM/BED: [facility]

: 53 AGE: 69 SEX: M ATTEND: 

Bong Corey MD

ADM DT: 25 AUTHOR: Jesu Hartman NP

REPT SERVICE DT/TIME: 25 1013

* ALL edits or amendments must be made on the 

electronic/computer document *





Subjective

Chief complaint:

NPO for PCI with Impella.

No CP, fever, chills.

NO N/v/d.

BP and HR stable.



Review of Systems

Constitutional:

Reports: fatigue, generalized weakness.

Allergy/Immun:

Denies: anaphylaxis, hives, itching.

ENT:

Denies: ear ringing, hearing loss, nose bleeding, 

sinus problem, throat pain,

tongue pain.

Respiratory:

Denies: hemoptysis, pleurisy, pneumonia, SOB.

Cardiovascular:

Denies: CARRERO (dyspnea on exertion), orthopnea, 

parox nocturnal dyspnea.

GI:

Denies: anorexia, diarrhea, hematochezia, hiatal 

hernia.

:

Denies: flank pain, nocturia, penile lesion, 

testicular pain.

Musculoskeletal:

Reports: extremity pain, joint pain.

Neuro:

Denies: change in LOC, focal weakness, headache, 

numbness.



Objective



General

VS/I O:

Vital Signs:

Date Time Temp Pulse Resp B/P B/P Pulse O2 O2 

Flow FiO2

Mean Ox Delivery Rate

03/ 0705 36.3 65 18 165/79 100

03/05 0425 36.6 66 16 127/71 0.0 100 Room air

03/04 2332 37.2 71 18 157/83 0.0 100 Nasal

cannula

03/04 2200 Nasal 2

cannula

03/04 1928  96 Nasal 4 36

cannula

03/04 1835 36.7 81 17 162/80 0.0 99 Room air

03/04 1834 76 162/80 110 98

03/04 1712 36.9 82 13 161/94 94

03/04 1627 80 21 161/94 122 96

03/04 1625 94 28 165/100 127 97

03/04 1116 36.7 64 13 166/83 110.5 97



24 hour I O ending at 0700:

03/05 0700 03/04 1900

Intake Total 600

Output Total 800 500

Balance -800 100



Intake, Oral 600

Output, Urine 800 500

Weight Standing scale

Measurement

Method



PATIENT WEIGHT:



Weight (lb): 163

Weight (oz): 2.27

Weight (kg): 74.000



Medications:

Active Meds + DC'd Last 24 Hrs

Prasugrel (EFFIENT) 10 MG DAILY PO (UNV)

Prasugrel (EFFIENT) 30 MG ONCE ONE PO (UNV)

Atropine Sulfate (ATROPINE SULFATE 0.1MG/ML SYR) 

0.5 MG ASDIR PRN IV (

UNV)

Sodium Chloride (SODIUM CHLORIDE 0.9%) 1,000 ML 

.[id number] ONE IV (UNV)

Sodium Chloride (SODIUM CHLORIDE 0.9%) 500 ML 

ASDIR PRN IV (UNV)

Clopidogrel Bisulfate (CLOPIDOGREL BISULFATE) 0 

.STK-MED ONE .ROUTE (DC)



Sodium Chloride (SODIUM CHLORIDE 0.9%) 250 ML 

.STK-MED ONE IV (DC)

Aminophylline (Aminophylline) 0 .STK-MED ONE IV 

(DC)

Sodium Chloride (SODIUM CHLORIDE 0.9%) 250 ML 

.STK-MED ONE IV (DC)

Adenosine (ADENOCARD I.V.) 0 .STK-MED ONE IV (DC)

Phenylephrine HCl (Phenylephrine  mcg/5 mL 

Inj) 0 .STK-MED ONE

.ROUTE (DC)

Heparin Sodium/Sodium Chloride (HEPARIN 2,000 

UNITS/NS 1,000mL) 1,000 ML

.STK-MED ONE IV (DC)

Iopamidol (ISOVUE-370 200ML) 0 .STK-MED ONE IV 

(DC)

Heparin Sodium (HEPARIN SODIUM) 0 .STK-MED ONE 

.ROUTE (DC)

Heparin Sodium (HEPARIN SODIUM) 0 .STK-MED ONE 

.ROUTE (DC)

Heparin Sodium/Sodium Chloride (HEPARIN 2,000 

UNITS/NS 1,000mL) 1,000 ML

.STK-MED ONE IV (DC)

Heparin Sodium/Sodium Chloride (HEPARIN 1,000 

UNITS/NS 500ML) 500 ML .STK-

MED ONE IV (DC)

Lidocaine HCl (LIDOCAINE HCL/PF) 0 .STK-MED ONE 

.ROUTE (DC)

Fentanyl Citrate (SUBLIMAZE) 0 .STK-MED ONE 

.ROUTE (DC)

Midazolam HCl (VERSED) 0 .STK-MED ONE .ROUTE (DC)

Nitroglycerin/Dextrose (NITROGLYCERIN 

50,000MCG/D5W 250ML) 250 ML .STK-MED

ONE IV (DC)

Aspirin (ASPIRIN) 0 .STK-MED ONE PO (DC)

Clopidogrel Bisulfate (Plavix) 0 .STK-MED ONE PO 

(DC)

Methylprednisolone Sodium Succinate (Solu-Medrol 

40 MG Vial) 40 MG Q12H IV



Clopidogrel Bisulfate (Plavix) 75 MG DAILY PO 

(DCr)

Metoprolol Succinate (TOPROL XL) 50 MG DAILY PO

Amlodipine Besylate (NORVASC) 5 MG BEDTIME PO

Hydralazine HCl (APRESOLINE) 10 MG Q6H PRN PRN IV

Valsartan (DIOVAN 160MG TAB) 160 MG DAILY PO

Heparin Sodium (HEPARIN 5000 UNITS/ML) 5,000 UNIT 

Q12HR SUBQ

Fluticasone Propionate (Flonase Nasal [place]) 2 

SPRAY DAILY NASAL (CKD)



Cefepime HCl (MAXIPIME) 1 GM Q6H IV (DC)

Sodium Chloride (SODIUM CHLORIDE) 10 ML

Atorvastatin Calcium (LIPITOR) 40 MG 2100 PO

Levalbuterol HCl (Levalbuterol 1.25 mg/3 mL NEB) 

1.25 MG RTQ6H INH

Sterile Water (WATER FOR INJECTION) 1 ML ASDIR 

PRN IV

Ipratropium Bromide (ATROVENT) 500 MCG RTQ6H INH

Levalbuterol HCl (Levalbuterol 1.25 mg/3 mL NEB) 

1.25 MG RTQ4H PRN PRN INH



Sterile Water (WATER FOR INJECTION) 1 ML ASDIR 

PRN IV

Budesonide (PULMICORT RESPULES) 0.5 MG RTBID INH

Aspirin (ASPIRIN) 81 MG DAILY PO





Dietitian nutrition assessment

The data set between the solid lines has been 

imported from the dietitian's

assessment.

_________________________________________________

________________



BMI Calculated: 25.0

Nutrition related diagnosis:

Nutrition diagnosis details:

Nutrition problem:

Nutrition etiology:

Nutrition signs and symptoms:

Nutrition prescription:

Dietitian name:

Assessment completed:

_________________________________________________

________________







Physical Exam

General appearance: alert, awake, oriented

Head/Eyes: atraumatic, normocephalic, PERRLA

Neck: full range of motion, supple/no 

meningismus, no bruit/NL carotids, no JVD

Cardiovascular: normal capillary refill, normal 

heart sounds, regular rate

rhythm

Respiratory: aerating well

Abdomen: non-tender, normal bowel sounds, soft

Genitourinary: no bladder distention, no flank 

pain, no urinary catheter

Extremities: no calf tenderness, no clubbing, no 

cyanosis

Musculoskeletal: no CVA tenderness, no muscle 

spasm

Neuro/CNS: alert, oriented X 3, CNII-XII intact



Results

Findings/Data:

Laboratory Tests

628

Chemistry

Sodium (134 - 147 mEq/L) 139

Potassium (3.4 - 5.0 mEq/L) 4.3

Chloride (100 - 108 mEq/L) 102

Carbon Dioxide (21 - 33 mEq/l) 32

Anion Gap (0 - 20) 9

BUN (7 - 25 mg/dL) 15

Creatinine (0.6 - 1.3 mg/dL) 0.8

Glomerular Filtr Rate (70 - 80) 95.2 H

Glucose (77 - 141 mg/dL) 106

Calcium (8.0 - 10.5 mg/dL) 8.2

Magnesium (1.6 - 2.6 mg/dL) 1.90



Laboratory Tests



0944 0905 0844 0812 0800

Coagulation

Activated Coag Time (SECONDS) 291 313 324 233 211





1636

Coagulation

INR (0.8 - 1.2) 1.1

PT Patient/Control Mix (9.3 - 12.9 SECONDS) 12.0



Laboratory Tests

628

Hematology

WBC (4.5 - 11.0 x10 3/uL) 8.3

RBC (4.00 - 5.60 x10 6/uL) 4.33

Hgb (12.5 - 16.9 g/dL) 13.0

Hct (37.5 - 50.7 %) 40.2

MCV (81.0 - 99.0 fL) 92.8

MCH (27.0 - 33.0 pg) 30.0

MCHC (33.0 - 37.0 g/dL) 32.3 L

RDW (11.5 - 14.5 %)  14.3

Plt Count (150 - 400 x10 3/uL) 167

MPV (7.0 - 9.0 fL) 10.4 H

Neut % (Auto) (56.0 - 77.0 %) 73.0

Lymph % (Auto) (14.0 - 32.0 %) 18.2

Mono % (Auto) (4.8 - 9.0 %) 7.4

Eos % (Auto) (0.3 - 3.7 %) 0.5

Baso % (Auto) (0.0 - 2.0 %) 0.1

Neut # (Auto) (2.0 - 7.6 x10 3/uL) 6.03

Lymph # (Auto) (1.0 - 3.8 x10 3/uL) 1.50

Mono # (Auto) (0.1 - 0.8 x10 3/uL) 0.61

Eos # (Auto) (0.0 - 0.2 x10 3/uL)  0.04

Baso # (Auto) (0.0 - 0.2 x10 3/uL) 0.01

Abs Immat Gran (auto) (0.00 - 0.03 x10 3/uL) 0.07 

H

Immature Gran % (0.0 - 2.0 %) 0.8

Nucleated RBC % (0 - 0 %) 0.0

Nucleated RBCs # (Man) (0.0 - 0.1 x10 3/uL) 0.00





Results: labs reviewed, vital signs reviewed, 

vital signs stable, current med

profile rev'd



Treatment Prophylaxis



Treatment Prophylaxis

Oxygen: room air



Diagnosis, Assessment Plan

Hospital course to date:

Assessment and plan:



Multivessel CAD.

Acute COPD excerbation.

SOB due to CAD.

Hyperkalemia.

HX of COPD and PNA.



Plan:



CVN1.

NPO for PCI with Impella today.

Pulmonary for COPD.

Monitor respirartory status, breathing tx, o2 

support.

IS.

Pain meds.

Antiemetics.

Cough meds.

Follow labs and replace as needed.

Continue home meds.

Monitor.





Code status: full code

Plan discussed with: patient, admitting 

physician, consultants, nurse



Electronically Signed by Jesu Hartman NP on 

25 at 1015

Electronically Signed by Amelia Justice MD on 

25 at 1149



RPT #:[id number]

***END OF REPORT***                                 [facility]

 

                                        2025 23:57:00 

[facility]

Cardiology Progress Note

REPORT#:[id number] REPORT STATUS: Signed

REPORT INITIALIZATION DATE:25 TIME: 



PATIENT: EPIFANIO QUINONES UNIT #: [id number]

ACCOUNT#: [id number] ROOM/BED: [place] [address]

: 53 AGE: 69 SEX: M ATTEND: 

Bong Corey MD

ADM DT: 25 AUTHOR: Marcos Buck MD

REPT SERVICE DT/TIME: 25 2050

* ALL edits or amendments must be made on the 

electronic/computer document *





Subjective



Free Text Subj Notes

Free Text Subj Notes:

Doing okay



Objective



General

VS/I O:

24 hour I O ending at 0700:

 0700  1900

Intake Total

Output Total 500 2210

Balance -500 -2210



Output, Urine 500 2210

Patient 74.1 kg

Weight

Weight Bed scale

Measurement

Method



Vital Signs:

Date Time Temp Pulse Resp B/P B/P Pulse O2 O2 

Flow FiO2

Mean Ox Delivery Rate

/ 2332 99.0 71 18 157/83 0.0 100 Nasal

cannula

03/ 1928 96 Nasal 4 36

cannula

/ 1835 98.1 81 17 162/80 0.0 99 Room air

/ 1834 76 162/80 110 98

03/04 1712 98.4 82 13 161/94 94

03/04 1627 80  21 161/94 122 96

03/04 1625 94 28 165/100 127 97

03/04 1116 98.1 64 13 166/83 110.5 97

03/04 0931 96 102 148/70 100 92

03/04 0834 97 Room air 21

/04 0830 Nasal 2 96

cannula

03/04 0646 97.7 75 14 137/64 0.0 95

03/04 0431 97.9 73 15 137/64 0.0 96 Nasal

cannula

03/04 0231 92 Nasal 3 32

cannula



PATIENT WEIGHT:



Weight (lb): 163

Weight (oz): 5.8

Weight (kg): 74.100



Medications:

Active Meds + DC'd Last 24 Hrs

Methylprednisolone Sodium Succinate (Solu-Medrol 

40 MG Vial) 40 MG Q12H IV



Clopidogrel Bisulfate (Plavix) 75 MG DAILY PO

Metoprolol Succinate (TOPROL XL) 50 MG DAILY PO

Amlodipine Besylate (NORVASC) 5 MG BEDTIME PO

Hydralazine HCl (APRESOLINE) 10 MG Q6H PRN PRN IV

Valsartan (DIOVAN 160MG TAB) 160 MG DAILY PO

Heparin Sodium (HEPARIN 5000 UNITS/ML) 5,000 UNIT 

Q12HR SUBQ

Fluticasone Propionate (Flonase Nasal [place]) 2 

SPRAY DAILY NASAL (CKD)



Cefepime HCl (MAXIPIME) 1 GM Q6H IV (DC)

Sodium Chloride (SODIUM CHLORIDE) 10 ML

Atorvastatin Calcium (LIPITOR) 40 MG 2100 PO

Levalbuterol HCl (Levalbuterol 1.25 mg/3 mL NEB) 

1.25 MG RTQ6H INH

Sterile Water (WATER FOR INJECTION) 1 ML ASDIR 

PRN IV

Ipratropium Bromide (ATROVENT) 500 MCG RTQ6H INH

Levalbuterol HCl (Levalbuterol 1.25 mg/3 mL NEB) 

1.25 MG RTQ4H PRN PRN INH



Sterile Water (WATER FOR INJECTION) 1 ML ASDIR 

PRN IV

Budesonide (PULMICORT RESPULES) 0.5 MG RTBID INH

Aspirin (ASPIRIN) 81 MG DAILY PO







Physical Exam

General appearance: alert, awake, oriented

Neck: non-tender, no JVD

Cardiovascular:

CV assessment: regular rate and rhythm

Respiratory: on oxygen, shortness of breath, 

wheezing

Abdomen: soft, non-tender, normal bowel sounds, 

no distention

Genitourinary: no urinary catheter

Lower extremity:

LE assessment: no calf tenderness, no edema

Musculoskeletal: normal inspection

Neuro/CNS: alert, oriented X 3, normal speech

Skin: dry, intact, normal color

Psychiatry: normal affect, normal mood



Results

Findings/Data:

Laboratory Tests



06

Chemistry

Sodium (134 - 147 mEq/L) 133 L

Potassium (3.4 - 5.0 mEq/L) 4.3

Chloride (100 - 108 mEq/L) 96 L

Carbon Dioxide (21 - 33 mEq/l) 32

Anion Gap (0 - 20) 10

BUN (7 - 25 mg/dL) 19

Creatinine (0.6 - 1.3 mg/dL) 0.8

Glomerular Filtr Rate (70 - 80) 95.2 H

Glucose (77 - 141 mg/dL) 189 H

Calcium (8.0 - 10.5 mg/dL) 8.4

Magnesium (1.6 - 2.6 mg/dL) 1.96



Laboratory Tests



1636

Coagulation

INR (0.8 - 1.2) 1.1

PT Patient/Control Mix (9.3 - 12.9 SECONDS) 12.0



Laboratory Tests



0605

Hematology

WBC (4.5 - 11.0 x10 3/uL) 8.5

RBC (4.00 - 5.60 x10 6/uL) 4.30

Hgb (12.5 - 16.9 g/dL) 12.9

Hct (37.5 - 50.7 %) 40.2

MCV (81.0 - 99.0 fL) 93.5

MCH (27.0 - 33.0 pg) 30.0

MCHC (33.0 - 37.0 g/dL) 32.1 L

RDW (11.5 - 14.5 %)  14.3

Plt Count (150 - 400 x10 3/uL) 144 L

MPV (7.0 - 9.0 fL) 10.3 H

Neut % (Auto) (56.0 - 77.0 %) 90.1 H

Lymph % (Auto) (14.0 - 32.0 %) 4.3 L

Mono % (Auto) (4.8 - 9.0 %) 4.6 L

Eos % (Auto) (0.3 - 3.7 %) 0.0 L

Baso % (Auto) (0.0 - 2.0 %) 0.1

Neut # (Auto) (2.0 - 7.6 x10 3/uL) 7.63 H

Lymph # (Auto) (1.0 - 3.8 x10 3/uL) 0.36 L

Mono # (Auto) (0.1 - 0.8 x10 3/uL) 0.39

Eos # (Auto) (0.0 - 0.2 x10 3/uL) 0.00

Baso # (Auto) (0.0 - 0.2 x10 3/uL) 0.01

Abs Immat Gran (auto) (0.00 - 0.03 x10 3/uL) 0.08 

H

Immature Gran % (0.0 - 2.0 %)  0.9

Nucleated RBC % (0 - 0 %) 0.0

Nucleated RBCs # (Man) (0.0 - 0.1 x10 3/uL) 0.00



Laboratory Tests



0605

Chemistry

Magnesium (1.6 - 2.6 mg/dL) 1.96









Diagnosis, Assessment Plan



Free Text DxA P Notes

Free Text DxA P Notes:

68 YO male with MH of COPD, lifelong heavy smoker 

who initially presented at [facility] with shortness of breath attributed to 

COPD exacerbation and

pneumonia. He was eventually discharged but 

presented back to the hospital with

persistent shortness of breath which prompted 

coronary angiogram which revealed

multivessel CAD. The patient is transferred to 

our facility for surgical

revascularization evaluation.



1. Multivessel CAD

* continue ASA and statin

* echo EF 40-44%, G1DD, hypokinesis of the mid 

anteroseptal, apical septal wall

and the apex

* case presented at CV conference, STS 5.6. But 

in light of advanced COPD and O2

dependence he is considered high surgical risk so 

the collective decision is to

proceed with PCI. WIll schedule him for 

Impella-assisted PCI LM-LAD on . Loaded with Plavix on Friday, 

2025, and has been on Plavix

75 mg daily



2. COPD exacerbation

lifelong tobacco abuse

recently treated for pneumonia

* pulmonary following



3. Hyperkalemia

* K 5.7->5.4->5.5->4.7

* given Lokelma 10 mg



4. Acute systolic and diastolic HF/Ischemic 

cardiomyopathy

* echo:EF 40-44%, G1DD, hypokinesis of the mid 

anteroseptal, apical septal wall

and the apex

* GDMT as tolerated: metoprolol succinate 50 mg 

daily, Valsartan 160 mg daily



5. Hypertension

* BP trending up

* cotinue metoprolol XL 50 mg daily

* Valsartan 160 mg daily







Electronically Signed by Marcos Buck MD on 

25 at 2357



RPT #:[id number]

***END OF REPORT***                                 [facility]

 

                                        2025 13:51:00 

[facility]

Pulmonology Progress Note

REPORT#:[id number] REPORT STATUS: Signed

REPORT INITIALIZATION DATE:25 TIME: 135



PATIENT: EPIFANIO QUINONES UNIT #: [id number]

ACCOUNT#: [id number] ROOM/BED: [place] [address]

: 53 AGE: 69 SEX: M ATTEND: 

Bong Corey MD

ADM DT: 25 AUTHOR: Lena Henry MD

REPT SERVICE DT/TIME: 25 1351

* ALL edits or amendments must be made on the 

electronic/computer document *





Subjective

Chief complaint:

SOB

HPI:

70 yo M with h/o COPD, chronic hypoxia on home 

oxygen who presented for [facility] in

[place] with worsening SOB and productive 

cough. She was treated there for

COPD exacerbation and pneumonia. She had LHC 

which showed mvd. She was

transfered to [facility] for further care. She has had 

productive cough with yellow

sputum. She is a chronic smoker - 1 pack per day. 

Pt has required BIPAP during

hospital course.

Comments:

no adverse events reported overnight

pt on NC 2L

SOB improved



ROS

All systems rev neg: except as marked



Review of Systems



ROS

All systems rev neg: except as marked



Objective



General

VS/I O:

Last Documented:

Result Date Time

Pulse Ox 97  1116

B/P 166/83  1116

B/P Mean 110.5  1116

Temp 98.1  1116

Pulse 64  1116

Resp 13  1116

FiO2 21  0834

O2 Delivery Room air  0834

O2 Flow Rate 2  0830



24 hour I O ending at 0700:

 0700  1900

Intake Total

Output Total 500 2210

Balance -500 -2210



Output, Urine 500 2210

Patient 74.1 kg

Weight

Weight Bed scale

Measurement

Method



PATIENT WEIGHT:



Weight (lb): 163

Weight (oz): 5.8

Weight (kg): 74.100



Medications:

Active Meds + DC'd Last 24 Hrs

Methylprednisolone Sodium Succinate (Solu-Medrol 

40 MG Vial) 40 MG Q12H IV



Clopidogrel Bisulfate (Plavix) 75 MG DAILY PO

Metoprolol Succinate (TOPROL XL) 50 MG DAILY PO

Amlodipine Besylate (NORVASC) 5 MG BEDTIME PO

Hydralazine HCl (APRESOLINE) 10 MG Q6H PRN PRN IV

Valsartan (DIOVAN 160MG TAB) 160 MG DAILY PO

Heparin Sodium (HEPARIN 5000 UNITS/ML) 5,000 UNIT 

Q12HR SUBQ

Fluticasone Propionate (Flonase Nasal [place]) 2 

SPRAY DAILY NASAL (CKD)



Cefepime HCl (MAXIPIME) 1 GM Q6H IV (DC)

Sodium Chloride (SODIUM CHLORIDE) 10 ML

Mupirocin (BACTROBAN 2% 22 GM OINTMENT) 1 APPLIC 

BID NASAL (DC)

Atorvastatin Calcium (LIPITOR) 40 MG 2100 PO

Levalbuterol HCl (Levalbuterol 1.25 mg/3 mL NEB) 

1.25 MG RTQ6H INH

Sterile Water (WATER FOR INJECTION) 1 ML ASDIR 

PRN IV

Ipratropium Bromide (ATROVENT) 500 MCG RTQ6H INH

Levalbuterol HCl (Levalbuterol 1.25 mg/3 mL NEB) 

1.25 MG RTQ4H PRN PRN INH



Sterile Water (WATER FOR INJECTION) 1 ML ASDIR 

PRN IV

Budesonide (PULMICORT RESPULES) 0.5 MG RTBID INH

Aspirin (ASPIRIN) 81 MG DAILY PO







Physical Exam

Head/eyes: atraumatic, normocephalic

ENT:

ENT: normal dentition, normal nose

Neck: full range of motion, non-tender, no 

lymphadenopathy

Cardiovascular: normal heart sounds, normal 

S1/S2, no murmur

Respiratory/chest: on oxygen, wheezing, aerating 

well, clear to auscultation,

symmetric expansion

Abdomen: soft, non-tender, normal bowel sounds

Genitourinary: no bladder distention, no flank 

pain

Extremities: moves all, normal capillary refill

Musculoskeletal: full range of motion, normal 

inspection, painless range of

motion

Neuro/CNS: alert, oriented X 3, CNII-XII intact

Skin: dry, intact





Diagnosis, Assessment Plan

Free Text A P:

Assessment:

Acute on chronic hypoxic and hypercarbic 

respiratory failure

COPD with acute exacerbation

Acute bronchitis - Pseudomonas infection

Multivessel CAD

Heavy tobacco smoker

Marijuana use



Work-up/Imaging - personally reviewed

CT Chest, 25 - centrilobular emphysema with 

apical predominance, SOILA

scarring, bronchial wall thickening

CXR, 25 - hyperinflation, prominent vascular 

markings

BNP elevated - 174

COVID and Flu negative



Recommendations:

Contnue steroids, weaned to IV Solumedrol 40mg 

q12h; SOB improved but still

having expiratory wheezing on exam

Bronchodilators scheduled, Atrovent and 

Levalbuterol

As needed bronchodilators

Inhaled corticosteroid

Continue antibiotics - Cefepime x 7d, s/p 

azithromycin x 3d

Sputum culture- Psuedomonas

Continue supplemental oxygen, wean to maintain O2 

sat 90-94%

BIPAP while sleeping and as needed

Smoking cessation coounseling provided

Pt undergoing CABG evaluation; recommend 

improvement in respiratory status prior

to surgical intervention; pt deemed high risks 

surgical risk; plan for PCI

tomorrow

DVT ppx - heparin



Thank you for this consult. Will continue to 

follow patient.



Lena Henry MD

Pulmonary Medicine



Electronically Signed by Lena Henry MD on 

25 at 0859



RPT #:[id number]

***END OF REPORT***                                 [facility]

 

                                        2025 11:10:00 

[facility]

Hospitalist Progress Note

REPORT#:[id number] REPORT STATUS: Signed

REPORT INITIALIZATION DATE:25 TIME: 1110



PATIENT: EPIFANIO QUINONES UNIT #: [id number]

ACCOUNT#: [id number] ROOM/BED: [place] [address]

: 53 AGE: 69 SEX: M ATTEND: 

Bong Corey MD

ADM DT: 25 AUTHOR: Jesu Hartman NP

REPT SERVICE DT/TIME: 25 1110

* ALL edits or amendments must be made on the 

electronic/computer document *





Subjective

Chief complaint:

He is waiting PCI.

No CP, fever, chills.

NO N/v/d.

BP and HR stable.



Review of Systems

Constitutional:

Reports: fatigue, generalized weakness.

Allergy/Immun:

Denies: anaphylaxis, itching, sneezing.

Respiratory:

Denies: hemoptysis, parox nocturnal dyspnea, 

pleurisy, pleuritic pain,

productive cough (sputum).

Cardiovascular:

Denies: CARRERO (dyspnea on exertion), orthopnea, 

palpitations.

GI:

Denies: abdominal pain, anorexia, diarrhea, 

hematemesis, melena, rectal pain.

:

Denies: flank pain, hematuria, nocturia, penile 

lesion.

Musculoskeletal:

Denies: extremity pain, joint swelling.

Neuro:

Denies: change in LOC, focal weakness, headache, 

seizure.



Objective



General

VS/I O:

Vital Signs:

Date Time Temp Pulse Resp B/P B/P Pulse O2 O2 

Flow FiO2

Mean  Ox Delivery Rate

 0834 97 Room air 21

 0646 36.5 75 14 137/64 0.0 95

 0431 36.6 73 15 137/64 0.0 96 Nasal

cannula

 0231 92 Nasal 3 32

cannula

 2339 36.6 92 20 156/73 0.0 96 Room air

 2200 Nasal 2

cannula

2027 97 176/85 122 100

2011 94 Nasal 2.5

cannula

 1821 37.0 80 15 134/68 0.0 90 Room air

 1820 83 31 134/68 92 86

 1605 36.7 74 14 159/79 0.0 91

 1600 75 23 159/79 112 91

 1149 36.7 76 15 150/79 0.0 98

 1148 77 150/79 107 97



24 hour I O ending at 0700:

 0700  1900

Intake Total

Output Total 500 2210

Balance -500 -2210



Output, Urine 500 2210

Patient 74.1 kg

Weight

Weight Bed scale

Measurement

Method



PATIENT WEIGHT:



Weight (lb): 163

Weight (oz): 5.8

Weight (kg): 74.100



Medications:

Active Meds + DC'd Last 24 Hrs

Sodium Polystyrene Sulfonate (KAYEXELATE) 30 GM 

ONCE ONE PO (DC)

Methylprednisolone Sodium Succinate (Solu-Medrol 

40 MG Vial) 40 MG Q12H IV



Clopidogrel Bisulfate (Plavix) 75 MG DAILY PO

Metoprolol Succinate (TOPROL XL) 50 MG DAILY PO

Amlodipine Besylate (NORVASC) 5 MG BEDTIME PO

Hydralazine HCl (APRESOLINE) 10 MG Q6H PRN PRN IV

Valsartan (DIOVAN 160MG TAB) 160 MG DAILY PO

Heparin Sodium (HEPARIN 5000 UNITS/ML) 5,000 UNIT 

Q12HR SUBQ

Fluticasone Propionate (Flonase Nasal [place]) 2 

SPRAY DAILY NASAL (CKD)



Cefepime HCl (MAXIPIME) 1 GM Q6H IV

Sodium Chloride (SODIUM CHLORIDE) 10 ML

Mupirocin (BACTROBAN 2% 22 GM OINTMENT) 1 APPLIC 

BID NASAL (DC)

Atorvastatin Calcium (LIPITOR) 40 MG 2100 PO

Levalbuterol HCl (Levalbuterol 1.25 mg/3 mL NEB) 

1.25 MG RTQ6H INH

Sterile Water (WATER FOR INJECTION) 1 ML ASDIR 

PRN IV

Ipratropium Bromide (ATROVENT) 500 MCG RTQ6H INH

Levalbuterol HCl (Levalbuterol 1.25 mg/3 mL NEB) 

1.25 MG RTQ4H PRN PRN INH



Sterile Water (WATER FOR INJECTION) 1 ML ASDIR 

PRN IV

Budesonide (PULMICORT RESPULES) 0.5 MG RTBID INH

Aspirin (ASPIRIN) 81 MG DAILY PO





Dietitian nutrition assessment

The data set between the solid lines has been 

imported from the dietitian's

assessment.

_________________________________________________

________________



BMI Calculated: 22.5

Nutrition related diagnosis:

Nutrition diagnosis details:

Nutrition problem:

Nutrition etiology:

Nutrition signs and symptoms:

Nutrition prescription:

Dietitian name:

Assessment completed:

_________________________________________________

________________







Physical Exam

General appearance: alert, awake

Head/Eyes: atraumatic, normocephalic, PERRLA

Neck: full range of motion, supple/no 

meningismus, no bruit/NL carotids, no JVD

Cardiovascular: normal capillary refill, normal 

heart sounds, regular rate

rhythm

Respiratory: aerating well

Abdomen: non-tender, normal bowel sounds, soft

Genitourinary: no bladder distention, no flank 

pain, no urinary catheter

Extremities: no calf tenderness, no clubbing, no 

cyanosis

Musculoskeletal: no CVA tenderness, no muscle 

spasm

Neuro/CNS: alert, oriented X 3, CNII-XII intact



Results

Findings/Data:

Laboratory Tests

605

Chemistry

Sodium (134 - 147 mEq/L) 133 L

Potassium (3.4 - 5.0 mEq/L) 4.3

Chloride (100 - 108 mEq/L) 96 L

Carbon Dioxide (21 - 33 mEq/l) 32

Anion Gap (0 - 20) 10

BUN (7 - 25 mg/dL) 19

Creatinine (0.6 - 1.3 mg/dL) 0.8

Glomerular Filtr Rate (70 - 80) 95.2 H

Glucose (77 - 141 mg/dL) 189 H

Calcium (8.0 - 10.5 mg/dL) 8.4

Magnesium (1.6 - 2.6 mg/dL) 1.96



Laboratory Tests

03/04

0605

Hematology

WBC (4.5 - 11.0 x10 3/uL) 8.5

RBC (4.00 - 5.60 x10 6/uL) 4.30

Hgb (12.5 - 16.9 g/dL)  12.9

Hct (37.5 - 50.7 %) 40.2

MCV (81.0 - 99.0 fL) 93.5

MCH (27.0 - 33.0 pg) 30.0

MCHC (33.0 - 37.0 g/dL) 32.1 L

RDW (11.5 - 14.5 %) 14.3

Plt Count (150 - 400 x10 3/uL) 144 L

MPV (7.0 - 9.0 fL) 10.3 H

Neut % (Auto) (56.0 - 77.0 %) 90.1 H

Lymph % (Auto) (14.0 - 32.0 %) 4.3 L

Mono % (Auto) (4.8 - 9.0 %) 4.6 L

Eos % (Auto) (0.3 - 3.7 %)  0.0 L

Baso % (Auto) (0.0 - 2.0 %) 0.1

Neut # (Auto) (2.0 - 7.6 x10 3/uL) 7.63 H

Lymph # (Auto) (1.0 - 3.8 x10 3/uL) 0.36 L

Mono # (Auto) (0.1 - 0.8 x10 3/uL) 0.39

Eos # (Auto) (0.0 - 0.2 x10 3/uL) 0.00

Baso # (Auto) (0.0 - 0.2 x10 3/uL) 0.01

Abs Immat Gran (auto) (0.00 - 0.03 x10 3/uL) 0.08 

H

Immature Gran % (0.0 - 2.0 %) 0.9

Nucleated RBC % (0 - 0 %) 0.0

Nucleated RBCs # (Man) (0.0 - 0.1 x10 3/uL) 0.00





Results: labs reviewed, vital signs reviewed, 

vital signs stable, current med

profile rev'd



Treatment Prophylaxis



Treatment Prophylaxis

Oxygen: room air



Diagnosis, Assessment Plan

Hospital course to date:

Assessment and plan:



Multivessel CAD.

Acute COPD excerbation.

SOB due to CAD.

Hyperkalemia.

HX of COPD and PNA.



Plan:



CVN1.

Will go for PCI with Impella tomorrow.

Pulmonary for COPD.

Monitor respirartory status, breathing tx, o2 

support.

IS.

Pain meds.

Antiemetics.

Cough meds.

Follow labs and replace as needed.

Continue home meds.

Monitor.





Code status: full code

Plan discussed with: patient, admitting 

physician, consultants, nurse



Electronically Signed by Jesu Hartman NP on 

25 at 1112

Electronically Signed by Amelia Justice MD on 

25 at 0853



RPT #:[id number]

***END OF REPORT***                                 [facility]

 

                                        2025 11:00:00 

[facility]

REPORT#:[id number] REPORT STATUS: Signed

REPORT INITIALIZATION DATE:25 TIME: 1100



PATIENT: EPIFANIO QUINONES UNIT #: [id number]

ACCOUNT#: [id number] ROOM/BED: [place] [address]

: 54 AGE: 69 SEX: M  ATTEND: 

Bong Corey MD

ADM DT: 25 AUTHOR: Jesu Hartman NP

REPT SERVICE DT/TIME: 25 1100

* ALL edits or amendments must be made on the 

electronic/computer document *





Subjective

Chief complaint:

He is doing better.

Eating well.

No CP, fever, chills.

NO N/v/d.

BP and HR stable.



Review of Systems

Constitutional:

Reports: fatigue, generalized weakness.

Eyes:

Denies: discharge, diplopia, eye pain, 

photophobia.

Respiratory:

Denies: hemoptysis, pleurisy, pleuritic pain, 

pneumonia, wheezing.

Cardiovascular:

Denies: CARRERO (dyspnea on exertion), orthopnea, 

palpitations.

GI:

Denies: anorexia, dysphagia, hematochezia, 

melena, nausea.

:

Denies: flank pain, nocturia, penile discharge, 

penile lesion, testicular

swelling.

Musculoskeletal:

Denies: extremity pain, joint swelling, lumbar 

pain.

Heme:

Denies: bleeding.

Neuro:

Denies: bowel dysfunction, confusion, dizziness, 

numbness, syncope.



Objective



General

VS/I O:

Vital Signs:

Date Time Temp Pulse Resp B/P B/P Pulse O2 O2 

Flow FiO2

Mean Ox Delivery Rate

 0818 93 Room air

 0800  Nasal 2

cannula

 0710 36.7 90 14 163/116 0.0 89

 0309 37.2 74 22 165/79 0.0 94 Nasal

cannula

 0049 36.7 75 16 145/71 0.0 97 Nasal

cannula

 2200 Nasal 3

cannula

2001  98 Nasal 3

cannula

 1921 36.9 76 20 126/76 0.0 97 Nasal

cannula

 1700 77 25 98

03/ 1600 78 25 95

03/ 1540 37.0 20 20 158/84 0.0 95

/ 1538 83 22 158/84 115 95

/ 1500 74 23 97

03/ 1400 68 21  98

/ 1247 95 49 92

 1228 36.8 23 23 147/75 0.0 93

 1227 84 24 147/75 103 91

03/02 1200  80 24 81



24 hour I O ending at 0700:

 0700  1900

Intake Total 765

Output Total 960

Balance -195



Intake, Oral  765

Number 1

Bowel

Movements

Output, Urine 960

Patient 67 kg

Weight

Weight Standing scale

Measurement

Method



PATIENT WEIGHT:



Weight (lb): 147

Weight (oz): 11.36

Weight (kg): 67.000



Medications:

Active Meds + DC'd Last 24 Hrs

Methylprednisolone Sodium Succinate (Solu-Medrol 

40 MG Vial) 40 MG Q12H IV



Clopidogrel Bisulfate (Plavix) 75 MG DAILY PO

Metoprolol Succinate (TOPROL XL) 50 MG DAILY PO

Amlodipine Besylate (NORVASC) 5 MG BEDTIME PO

Hydralazine HCl (APRESOLINE) 10 MG Q6H PRN PRN IV

Valsartan (DIOVAN 160MG TAB) 160 MG DAILY PO

Heparin Sodium (HEPARIN 5000 UNITS/ML) 5,000 UNIT 

Q12HR SUBQ

Fluticasone Propionate (Flonase Nasal [place]) 2 

SPRAY DAILY NASAL (CKD)



Cefepime HCl (MAXIPIME) 1 GM Q6H IV

Sodium Chloride (SODIUM CHLORIDE) 10 ML

Mupirocin (BACTROBAN 2% 22 GM OINTMENT) 1 APPLIC 

BID NASAL

Atorvastatin Calcium (LIPITOR) 40 MG 2100 PO

Levalbuterol HCl (Levalbuterol 1.25 mg/3 mL NEB) 

1.25 MG RTQ6H INH

Sterile Water (WATER FOR INJECTION) 1 ML ASDIR 

PRN IV

Ipratropium Bromide (ATROVENT) 500 MCG RTQ6H INH

Levalbuterol HCl (Levalbuterol 1.25 mg/3 mL NEB) 

1.25 MG RTQ4H PRN PRN INH



Sterile Water (WATER FOR INJECTION) 1 ML ASDIR 

PRN IV

Budesonide (PULMICORT RESPULES) 0.5 MG RTBID INH

Aspirin (ASPIRIN) 81 MG DAILY PO





Dietitian nutrition assessment

The data set between the solid lines has been 

imported from the dietitian's

assessment.

_________________________________________________

________________



BMI Calculated: 22.5

Nutrition related diagnosis:

Nutrition diagnosis details:

Nutrition problem:

Nutrition etiology:

Nutrition signs and symptoms:

Nutrition prescription:

Dietitian name:

Assessment completed:

_________________________________________________

________________







Physical Exam

General appearance: alert, awake, oriented

Head/Eyes: atraumatic, normocephalic, PERRLA

Neck: full range of motion, supple/no 

meningismus, no bruit/NL carotids, no JVD

Cardiovascular: normal capillary refill, normal 

heart sounds, regular rate

rhythm

Respiratory: aerating well

Abdomen: non-tender, normal bowel sounds, soft

Genitourinary: no bladder distention, no flank 

pain, no urinary catheter

Extremities: no calf tenderness, no clubbing, no 

cyanosis

Musculoskeletal: no CVA tenderness, no muscle 

spasm

Neuro/CNS: alert, oriented X 3, CNII-XII intact



Results

Findings/Data:

Laboratory Tests



0840

Chemistry

Sodium (134 - 147 mEq/L) 136

Potassium (3.4 - 5.0 mEq/L) 5.1 H

Chloride (100 - 108 mEq/L) 96 L

Carbon Dioxide (21 - 33 mEq/l) 34 H

Anion Gap (0 - 20)  11

BUN (7 - 25 mg/dL) 22

Creatinine (0.6 - 1.3 mg/dL) 1.0

Glomerular Filtr Rate (70 - 80) 81.0 H

Glucose (77 - 141 mg/dL) 234 H

Calcium (8.0 - 10.5 mg/dL) 9.3

Magnesium (1.6 - 2.6 mg/dL) 2.12



Laboratory Tests



0841

Hematology

WBC (4.5 - 11.0 x10 3/uL) 9.6

RBC (4.00 - 5.60 x10 6/uL) 4.86

Hgb (12.5 - 16.9 g/dL)  14.6

Hct (37.5 - 50.7 %) 46.8

MCV (81.0 - 99.0 fL) 96.3

MCH (27.0 - 33.0 pg) 30.0

MCHC (33.0 - 37.0 g/dL) 31.2 L

RDW (11.5 - 14.5 %) 14.7 H

Plt Count (150 - 400 x10 3/uL) 173

MPV (7.0 - 9.0 fL) 10.7 H

Neut % (Auto) (56.0 - 77.0 %) 89.5 H

Lymph % (Auto) (14.0 - 32.0 %) 6.8 L

Mono % (Auto) (4.8 - 9.0 %) 3.1 L

Eos % (Auto) (0.3 - 3.7 %)  0.0 L

Baso % (Auto) (0.0 - 2.0 %) 0.1

Neut # (Auto) (2.0 - 7.6 x10 3/uL) 8.62 H

Lymph # (Auto) (1.0 - 3.8 x10 3/uL) 0.66 L

Mono # (Auto) (0.1 - 0.8 x10 3/uL) 0.30

Eos # (Auto) (0.0 - 0.2 x10 3/uL) 0.00

Baso # (Auto) (0.0 - 0.2 x10 3/uL) 0.01

Abs Immat Gran (auto) (0.00 - 0.03 x10 3/uL) 0.05 

H

Immature Gran % (0.0 - 2.0 %) 0.5

Nucleated RBC % (0 - 0 %) 0.0

Nucleated RBCs # (Man) (0.0 - 0.1 x10 3/uL) 0.00





Results: labs reviewed, vital signs reviewed, 

vital signs stable, current med

profile rev'd



Treatment Prophylaxis



Treatment Prophylaxis

Oxygen: room air



Diagnosis, Assessment Plan

Hospital course to date:

Assessment and plan:



Multivessel CAD.

Acute COPD excerbation.

SOB due to CAD.

Hyperkalemia.

HX of COPD and PNA.



Plan:



CVN1.

Will go for PCI with Impella on wednesday.

Pulmonary for COPD.

Monitor respirartory status, breathing tx, o2 

support.

IS.

Pain meds.

Antiemetics.

Cough meds.

Follow labs and replace as needed.

Continue home meds.

Monitor.





Code status: full code

Plan discussed with: patient, admitting 

physician, consultants, nurse



Electronically Signed by Jesu Hartman NP on 

25 at 1102

Electronically Signed by Amelia Justice MD on 

25 at 0803



RPT #:[id number]

***END OF REPORT***                                 [facility]

 

                                        2025 10:53:00 

[facility])

Pulmonology Progress Note

REPORT#:[id number] REPORT STATUS: Signed

REPORT INITIALIZATION DATE:25 TIME: 1053



PATIENT: EPIFANIO QUINONES UNIT #: [id number]

ACCOUNT#: [id number] ROOM/BED: [place] [address]

: 53 AGE: 69 SEX: M ATTEND: 

Bong Corey MD

ADM DT: 25 AUTHOR: Lena Henry MD

REPT SERVICE DT/TIME: 25 1053

* ALL edits or amendments must be made on the 

electronic/computer document *





Subjective

Chief complaint:

SOB

HPI:

70 yo M with h/o COPD, chronic hypoxia on home 

oxygen who presented for OHS in

[place] with worsening SOB and productive 

cough. She was treated there for

COPD exacerbation and pneumonia. She had LHC 

which showed mvd. She was

transfered to [facility] for further care. She has had 

productive cough with yellow

sputum. She is a chronic smoker - 1 pack per day. 

Pt has required BIPAP during

hospital course.

Comments:

respiratory status improving

SOB/wheezing improving

pt anxious to have LHC performed





ROS

All systems rev neg: except as marked



Review of Systems



ROS

All systems rev neg: except as marked



Objective



General

VS/I O:

Last Documented:

Result Date Time

Pulse Ox 93  0818

O2 Delivery Room air  0818

B/P 163/116  0710

B/P Mean 0.0  0710

Temp 98.1  0710

Pulse 90  0710

Resp 14  0710

O2 Flow Rate 3  2200

FiO2 21  0830



24 hour I O ending at 0700:

 0700  1900

Intake Total 765

Output Total 960

Balance -195



Intake, Oral 765

Number 1

Bowel

Movements

Output, Urine  960

Patient 67 kg

Weight

Weight Standing scale

Measurement

Method



PATIENT WEIGHT:



Weight (lb): 147

Weight (oz): 11.36

Weight (kg): 67.000



Medications:

Active Meds + DC'd Last 24 Hrs

Methylprednisolone Sodium Succinate (Solu-Medrol 

40 MG Vial) 40 MG Q12H IV



Clopidogrel Bisulfate (Plavix) 75 MG DAILY PO

Metoprolol Succinate (TOPROL XL) 50 MG DAILY  PO

Amlodipine Besylate (NORVASC) 5 MG BEDTIME PO

Hydralazine HCl (APRESOLINE) 10 MG Q6H PRN PRN IV

Valsartan (DIOVAN 160MG TAB) 160 MG DAILY PO

Heparin Sodium (HEPARIN 5000 UNITS/ML) 5,000 UNIT 

Q12HR SUBQ

Fluticasone Propionate (Flonase Nasal [place]) 2 

SPRAY DAILY NASAL (CKD)



Cefepime HCl (MAXIPIME) 1 GM Q6H IV

Sodium Chloride (SODIUM CHLORIDE) 10 ML

Mupirocin (BACTROBAN 2% 22 GM OINTMENT) 1 APPLIC 

BID NASAL

Atorvastatin Calcium (LIPITOR) 40 MG 2100 PO

Levalbuterol HCl (Levalbuterol 1.25 mg/3 mL NEB) 

1.25 MG RTQ6H INH

Sterile Water (WATER FOR INJECTION) 1 ML ASDIR 

PRN IV

Ipratropium Bromide (ATROVENT) 500 MCG RTQ6H INH

Levalbuterol HCl (Levalbuterol 1.25 mg/3 mL NEB) 

1.25 MG RTQ4H PRN PRN INH



Sterile Water (WATER FOR INJECTION) 1 ML ASDIR 

PRN IV

Budesonide (PULMICORT RESPULES) 0.5 MG RTBID INH

Aspirin (ASPIRIN) 81 MG DAILY PO







Physical Exam

Head/eyes: atraumatic, normocephalic

ENT:

ENT: normal dentition, normal nose

Neck: full range of motion, non-tender, no 

lymphadenopathy

Cardiovascular: normal heart sounds, normal 

S1/S2, no murmur

Respiratory/chest: on oxygen, wheezing, aerating 

well, clear to auscultation,

symmetric expansion

Abdomen: soft, non-tender, normal bowel sounds

Genitourinary: no bladder distention, no flank 

pain

Extremities: moves all, normal capillary refill

Musculoskeletal: full range of motion, normal 

inspection, painless range of

motion

Neuro/CNS: alert, oriented X 3, CNII-XII intact

Skin: dry, intact





Diagnosis, Assessment Plan

Free Text A P:

Assessment:

Acute on chronic hypoxic and hypercarbic 

respiratory failure

COPD with acute exacerbation

Acute bronchitis - Pseudomonas infection

Multivessel CAD

Heavy tobacco smoker

Marijuana use



Work-up/Imaging - personally reviewed

CT Chest, 25 - centrilobular emphysema with 

apical predominance, SOILA

scarring, bronchial wall thickening

CXR, 25 - hyperinflation, prominent vascular 

markings

BNP elevated - 174

COVID and Flu negative



Recommendations:

Conitnue steroids, weaned to IV Solumedrol 40mg 

q12h; still having expiratory

wheezing on exam and intermittent SOB

Bronchodilators scheduled, Atrovent and 

Levalbuterol

As needed bronchodilators

Inhaled corticosteroid

Conitnue antibiotics - Cefepime x 7d, s/p 

azithromycin x 3d

Sputum culture- Psuedomonas

Continue supplemental oxygen, pt on HFNC 4-5L; 

wean to maintain O2 sat 90-94%

BIPAP while sleeping and as needed

Smoking cessation coounseling provided

Pt undergoing CABG evaluation; recommend 

improvement in respiratory status prior

to surgical intervention; pt deemed high risks 

surgical risk; plan for PCI

DVT ppx - heparin



Thank you for this consult. Will continue to 

follow patient.



Lena Henry MD

Pulmonary Medicine



Electronically Signed by Lena Henry MD on 

25 at 1505



RPT #:[id number]

***END OF REPORT***                                 [facility]

 

                                        2025 01:17:00 

[facility] ([facility])

Cardiology Progress Note

REPORT#:[id number] REPORT STATUS: Signed

REPORT INITIALIZATION DATE:25 TIME: 117



PATIENT: EPIFANIO QUINONES UNIT #: [id number]

ACCOUNT#: [id number] ROOM/BED: [place] [address]

: 53 AGE: 69 SEX: M ATTEND: 

Bong Corey MD

ADM DT: 25 AUTHOR: Marcos Buck MD

REPT SERVICE DT/TIME: 25

* ALL edits or amendments must be made on the 

electronic/computer document *





Subjective



Free Text Subj Notes

Free Text Subj Notes:

Doing okay



Objective



General

VS/I O:

24 hour I O ending at 0700:

 0700  1900

Intake Total 765

Output Total 960

Balance  -195



Intake, Oral 765

Number 1

Bowel

Movements

Output, Urine 960



Vital Signs:

Date Time Temp Pulse Resp B/P B/P Pulse O2 O2 

Flow FiO2

Mean Ox Delivery Rate

 0049 98.1 75 16 145/71 0.0 97 Nasal

cannula

03/02 2200 Nasal 3

cannula

/ 2001 98 Nasal 3

cannula

03/02 1921 98.4 76 20 126/76 0.0 97 Nasal

cannula

03/02 1700 77 25 98

03/02 1600 78 25 95

03/02 1540 98.6 20 20 158/84 0.0 95

03/02 1538  83 22 158/84 115 95

03/02 1500 74 23 97

03/02 1400 68 21 98

03/02 1247 95 49  92

03/02 1228 98.2 23 23 147/75 0.0 93

03/02 1227 84 24 147/75 103 91

03/02 1200 80 24 81

03/02 1100  69 16 98

03/02 1000 94 39 91

03/02 0930 Nasal 3

cannula

03/02 0842 77 23 178/86 123 99

03/02 0830 96 Room air 21

03/02 0345 97.2  79 19 148/68 0.0 97 Room air



PATIENT WEIGHT:



Weight (lb): 148

Weight (oz): 2.41

Weight (kg): 67.200



Medications:

Active Meds + DC'd Last 24 Hrs

Methylprednisolone Sodium Succinate (Solu-Medrol 

40 MG Vial) 40 MG Q12H IV



Clopidogrel Bisulfate (Plavix) 75 MG DAILY PO

Metoprolol Succinate (TOPROL XL) 50 MG DAILY PO

Amlodipine Besylate (NORVASC) 5 MG BEDTIME PO

Hydralazine HCl (APRESOLINE) 10 MG Q6H PRN PRN IV

Valsartan (DIOVAN 160MG TAB) 160 MG DAILY PO

Heparin Sodium (HEPARIN 5000 UNITS/ML) 5,000 UNIT 

Q12HR SUBQ

Fluticasone Propionate (Flonase Nasal [place]) 2 

SPRAY DAILY NASAL (CKD)



Cefepime HCl (MAXIPIME) 1 GM Q6H IV

Sodium Chloride (SODIUM CHLORIDE) 10 ML

Mupirocin (BACTROBAN 2% 22 GM OINTMENT) 1 APPLIC 

BID NASAL

Atorvastatin Calcium (LIPITOR) 40 MG 2100 PO

Levalbuterol HCl (Levalbuterol 1.25 mg/3 mL NEB) 

1.25 MG RTQ6H INH

Methylprednisolone Sodium Succinate (Solu-Medrol 

40 MG Vial) 40 MG Q8H IV

(DC)

Sterile Water (WATER FOR INJECTION) 1 ML ASDIR 

PRN IV

Ipratropium Bromide (ATROVENT) 500 MCG RTQ6H INH

Levalbuterol HCl (Levalbuterol 1.25 mg/3 mL NEB) 

1.25 MG RTQ4H PRN PRN INH



Sterile Water (WATER FOR INJECTION) 1 ML ASDIR 

PRN IV

Budesonide (PULMICORT RESPULES) 0.5 MG RTBID INH

Aspirin (ASPIRIN) 81 MG DAILY PO







Physical Exam

General appearance: alert, awake, oriented

Neck: non-tender, no JVD

Cardiovascular:

CV assessment: regular rate and rhythm

Respiratory: on oxygen, shortness of breath, 

wheezing

Abdomen: soft, non-tender, normal bowel sounds, 

no distention

Genitourinary: no urinary catheter

Lower extremity:

LE assessment: no calf tenderness, no edema

Musculoskeletal: normal inspection

Neuro/CNS: alert, oriented X 3, normal speech

Skin: dry, intact, normal color

Psychiatry: normal affect, normal mood



Results

Findings/Data:

Laboratory Tests



0426

Chemistry

Sodium (134 - 147 mEq/L) 138

Potassium (3.4 - 5.0 mEq/L) 4.9

Chloride (100 - 108 mEq/L) 100

Carbon Dioxide (21 - 33 mEq/l) 34 H

Anion Gap (0 - 20) 9

BUN (7 - 25 mg/dL) 20

Creatinine (0.6 - 1.3 mg/dL) 0.8

Glomerular Filtr Rate (70 - 80) 95.2 H

Glucose (77 - 141 mg/dL) 127

Calcium (8.0 - 10.5 mg/dL) 9.2

Magnesium (1.6 - 2.6 mg/dL) 2.18

Total Bilirubin (0.0 - 1.0 mg/dL) 0.50

AST (8 - 34 IUnit/L) 32

ALT (10 - 49 IUnit/L) 59 H

Total Alk Phosphatase (20 - 125 IUnit/L) 93

Total Protein (5.7 - 8.2 g/dL) 6.7

Albumin (3.4 - 5.0 g/dL)  3.20 L



Laboratory Tests



0425

Hematology

WBC (4.5 - 11.0 x10 3/uL) 9.7

RBC (4.00 - 5.60 x10 6/uL) 4.85

Hgb (12.5 - 16.9 g/dL)  14.9

Hct (37.5 - 50.7 %) 45.4

MCV (81.0 - 99.0 fL) 93.6

MCH (27.0 - 33.0 pg) 30.7

MCHC (33.0 - 37.0 g/dL) 32.8 L

RDW (11.5 - 14.5 %) 14.5

Plt Count (150 - 400 x10 3/uL) 188

MPV (7.0 - 9.0 fL) 10.5 H

Neut % (Auto) (56.0 - 77.0 %) 89.8 H

Lymph % (Auto) (14.0 - 32.0 %) 4.3 L

Mono % (Auto) (4.8 - 9.0 %) 5.3

Eos % (Auto) (0.3 - 3.7 %)  0.0 L

Baso % (Auto) (0.0 - 2.0 %) 0.1

Neut # (Auto) (2.0 - 7.6 x10 3/uL) 8.72 H

Lymph # (Auto) (1.0 - 3.8 x10 3/uL) 0.42 L

Mono # (Auto) (0.1 - 0.8 x10 3/uL) 0.51

Eos # (Auto) (0.0 - 0.2 x10 3/uL) 0.00

Baso # (Auto) (0.0 - 0.2 x10 3/uL) 0.01

Abs Immat Gran (auto) (0.00 - 0.03 x10 3/uL) 0.05 

H

Immature Gran % (0.0 - 2.0 %) 0.5

Nucleated RBC % (0 - 0 %) 0.0

Nucleated RBCs # (Man) (0.0 - 0.1 x10 3/uL) 0.00



Laboratory Tests



0426

Chemistry

Magnesium (1.6 - 2.6 mg/dL) 2.18









Diagnosis, Assessment Plan



Free Text DxA P Notes

Free Text DxA P Notes:

68 YO male with MH of COPD, lifelong heavy smoker 

who initially presented at [facility] with shortness of breath attributed to 

COPD exacerbation and

pneumonia. He was eventually discharged but 

presented back to the hospital with

persistent shortness of breath which prompted 

coronary angiogram which revealed

multivessel CAD. The patient is transferred to 

our facility for surgical

revascularization evaluation.



1. Multivessel CAD

* continue ASA and statin

* echo EF 40-44%, G1DD, hypokinesis of the mid 

anteroseptal, apical septal wall

and the apex

* case presented at CV conference, STS 5.6. But 

in light of advanced COPD and O2

dependence he is considered high surgical risk so 

the collective decision is to

proceed with PCI. WIll schedule him for 

Impella-assisted PCI LM-LAD on . Loaded with Plavix on Friday, 

2025, and has been on Plavix

75 mg daily



2. COPD exacerbation

lifelong tobacco abuse

recently treated for pneumonia

* pulmonary following



3. Hyperkalemia

* K 5.7->5.4->5.5->4.7

* given Lokelma 10 mg



4. Acute systolic and diastolic HF/Ischemic 

cardiomyopathy

* echo:EF 40-44%, G1DD, hypokinesis of the mid 

anteroseptal, apical septal wall

and the apex

* GDMT as tolerated: metoprolol succinate 50 mg 

daily, Valsartan 160 mg daily



5. Hypertension

* BP trending up

* cotinue metoprolol XL 50 mg daily

* Valsartan 160 mg daily







Electronically Signed by Marcos Buck MD on 

25 at 2356



RPT #:[id number]

***END OF REPORT***                                 [facility]

 

                                        2025 10:42:00 

[facility] ([facility])

Pulmonology Progress Note

REPORT#:[id number] REPORT STATUS: Signed

REPORT INITIALIZATION DATE:25 TIME: 104



PATIENT: EPIFANIO QUINONES UNIT #: [id number]

ACCOUNT#: [id number] ROOM/BED: [place] [address]

: 53 AGE: 69 SEX: M ATTEND: 

Bong Corey MD

ADM DT: 25 AUTHOR: Lena Henry MD

REPT SERVICE DT/TIME: 25 1042

* ALL edits or amendments must be made on the 

electronic/computer document *





Subjective

Chief complaint:

SOB

HPI:

70 yo M with h/o COPD, chronic hypoxia on home 

oxygen who presented for [facility] in

[place] with worsening SOB and productive 

cough. She was treated there for

COPD exacerbation and pneumonia. She had LHC 

which showed mvd. She was

transfered to [facility] for further care. She has had 

productive cough with yellow

sputum. She is a chronic smoker - 1 pack per day. 

Pt has required BIPAP during

hospital course.

Comments:

Pt denies SOB, chest pain

Wheezing resolved

VSS

on O2 via NC



Review of Systems



ROS

All systems rev neg: except as marked



Objective



General

VS/I O:

Last Documented:

Result Date Time

Pulse Ox 96  0830

FiO2 21  0830

O2 Delivery Room air  0830

B/P 148/68  0345

B/P Mean 0.0  0345

Temp 97.2  0345

Pulse 79  0345

Resp 19  0345

O2 Flow Rate  3  2100



24 hour I O ending at 0700:

 0700  1900

Intake Total 700 650

Output Total 620

Balance 700 30



Intake, Oral 700 650

Number 1

Bowel

Movements

Number Voids 2

Output, Urine 620

Patient 67.2 kg

Weight

Weight Standing scale

Measurement

Method



PATIENT WEIGHT:



Weight (lb): 148

Weight (oz): 2.41

Weight (kg): 67.200



Medications:

Active Meds + DC'd Last 24 Hrs

Clopidogrel Bisulfate (Plavix) 75 MG DAILY PO

Metoprolol Succinate (TOPROL XL) 50 MG DAILY PO

Amlodipine Besylate (NORVASC) 5 MG BEDTIME PO

Hydralazine HCl (APRESOLINE) 10 MG Q6H PRN PRN IV

Valsartan (DIOVAN 160MG TAB) 160 MG DAILY PO

Heparin Sodium (HEPARIN 5000 UNITS/ML) 5,000 UNIT 

Q12HR SUBQ

Fluticasone Propionate (Flonase Nasal [place]) 2 

SPRAY DAILY NASAL (CKD)



Cefepime HCl (MAXIPIME) 1 GM Q6H IV

Sodium Chloride (SODIUM CHLORIDE) 10 ML

Mupirocin (BACTROBAN 2% 22 GM OINTMENT) 1 APPLIC 

BID NASAL

Atorvastatin Calcium (LIPITOR) 40 MG 2100 PO

Levalbuterol HCl (Levalbuterol 1.25 mg/3 mL NEB) 

1.25 MG RTQ6H INH

Methylprednisolone Sodium Succinate (Solu-Medrol 

40 MG Vial) 40 MG Q8H IV



Sterile Water (WATER FOR INJECTION) 1 ML ASDIR 

PRN IV

Ipratropium Bromide (ATROVENT) 500 MCG RTQ6H INH

Levalbuterol HCl (Levalbuterol 1.25 mg/3 mL NEB) 

1.25 MG RTQ4H PRN PRN INH



Sterile Water (WATER FOR INJECTION) 1 ML ASDIR 

PRN IV

Budesonide (PULMICORT RESPULES) 0.5 MG RTBID INH

Aspirin (ASPIRIN) 81 MG DAILY PO







Physical Exam

Head/eyes: atraumatic, normocephalic

ENT:

ENT: normal dentition, normal nose

Neck: full range of motion, non-tender, no 

lymphadenopathy

Cardiovascular: normal heart sounds, normal 

S1/S2, no murmur

Respiratory/chest: on oxygen, wheezing, aerating 

well, clear to auscultation,

symmetric expansion

Abdomen: soft, non-tender, normal bowel sounds

Genitourinary: no bladder distention, no flank 

pain

Extremities: moves all, normal capillary refill

Musculoskeletal: full range of motion, normal 

inspection, painless range of

motion

Neuro/CNS: alert, oriented X 3, CNII-XII intact

Skin: dry, intact





Diagnosis, Assessment Plan

Free Text A P:

Assessment:

Acute on chronic hypoxic and hypercarbic 

respiratory failure

COPD with acute exacerbation

Acute bronchitis - Pseudomonas infection

Multivessel CAD

Heavy tobacco smoker

Marijuana use



Work-up/Imaging - personally reviewed

CT Chest, 25 - centrilobular emphysema with 

apical predominance, SOILA

scarring, bronchial wall thickening

CXR, 25 - hyperinflation, prominent vascular 

markings

BNP elevated - 174

COVID and Flu negative



Recommendations:

Conitnue steroids IV Solumedrol 40mg, change to 

q12h, as SOB/wheezing improved

Bronchodilators scheduled, Atrovent and 

Levalbuterol

As needed bronchodilators

Inhaled corticosteroid

Conitnue antibiotics - Cefepime, s/p azithromycin 

x 3d

Sputum culture- Psueomonas

Continue supplemental oxygen, pt on HFNC 4-5L; 

wean to maintain O2 sat 90-94%

BIPAP while sleeping and as needed

Smoking cessation coounseling provided

Pt undergoing CABG evaluation; recommend 

improvement in respiratory status prior

to surgical intervention; pt deemed high risks 

surgical risk; plan for PCI

DVT ppx - heparin



Thank you for this consult. Will continue to 

follow patient.



Lena Henry MD

Pulmonary Medicine



Electronically Signed by Lena Henry MD on 

25 at 1143



RPT #:[id number]

***END OF REPORT***                                 [facility]

 

                                        2025 10:09:00 

[facility]

Hospitalist Progress Note

REPORT#:[id number] REPORT STATUS: Signed

REPORT INITIALIZATION DATE:25 TIME: 100



PATIENT: EPIFANIO QUINONES  UNIT #: [id number]

ACCOUNT#: [id number] ROOM/BED: [place] [address]

: 53 AGE: 69 SEX: M ATTEND: 

Bong Corey MD

ADM DT: 25  AUTHOR: Jesu Hartman NP

REPT SERVICE DT/TIME: 25 1009

* ALL edits or amendments must be made on the 

electronic/computer document *





Subjective

Chief complaint:

He is waiting for PCI.

No CP, fever, chills.

NO N/v/d.

BP and HR stable.



Review of Systems

Constitutional:

Reports: fatigue, generalized weakness.

Allergy/Immun:

Denies: anaphylaxis, hives, itching, rhinorrhea.

Respiratory:

Denies: CARRERO (dyspnea on exertion), parox 

nocturnal dyspnea, pleuritic pain,

pneumonia, SOB.

Cardiovascular:

Denies: CARRERO (dyspnea on exertion), palpitations, 

parox nocturnal dyspnea.

GI:

Denies: anorexia, dysphagia, hematemesis, nausea.

:

Denies: flank pain, hematuria, nocturia, penile 

discharge, testicular pain.

Musculoskeletal:

Denies: extremity pain, joint pain, joint 

swelling, lumbar pain, neck pain.

Endocrine:

Denies: heat intolerance, polyphagia, polyuria.

Neuro:

Denies: bowel dysfunction, dizziness, gait 

problem, headache, lightheaded.



Objective



General

VS/I O:

Vital Signs:

Date Time Temp Pulse Resp B/P B/P Pulse O2 O2 

Flow FiO2

Mean Ox Delivery Rate

 0830 96 Room air 21

 0345 36.2 79 19 148/68 0.0 97 Room air

 2337 36.6 86 24 159/76 0.0 91 Nasal

cannula

 2100 Nasal 3

cannula

 2028 95 Nasal  2

cannula

 1839 36.7 83 22 137/64 0.0 93 Nasal

cannula

 1828 73 21 96

 1622 94 52 95

 1535 36.8 96 16 148/78 0.0 93 Room air

 1534  96 0 148/78 107 95

 1500 85 96

 1351 90 34 172/99 128 91

 1258 99 25 160/85 115 88

 1111 36.9 92 16 160/83 0.0 97 Room air

 1109 90 22 160/83 115 96

 1100 84 30 98



24 hour I O ending at 0700:

 0700  1900

Intake Total 700 650

Output Total  620

Balance 700 30



Intake, Oral 700 650

Number  1

Bowel

Movements

Number Voids 2

Output, Urine  620

Patient 67.2 kg

Weight

Weight Standing scale

Measurement

Method



PATIENT WEIGHT:



Weight (lb): 148

Weight (oz): 2.41

Weight (kg): 67.200



Medications:

Active Meds + DC'd Last 24 Hrs

Clopidogrel Bisulfate (Plavix) 75 MG DAILY PO

Metoprolol Succinate (TOPROL XL) 50 MG DAILY PO

Amlodipine Besylate (NORVASC) 5 MG BEDTIME PO

Hydralazine HCl (APRESOLINE) 10 MG Q6H PRN PRN IV

Valsartan (DIOVAN 160MG TAB) 160 MG DAILY PO

Heparin Sodium (HEPARIN 5000 UNITS/ML) 5,000 UNIT 

Q12HR SUBQ

Fluticasone Propionate (Flonase Nasal [place]) 2 

SPRAY DAILY NASAL (CKD)



Cefepime HCl (MAXIPIME) 1 GM Q6H IV

Sodium Chloride (SODIUM CHLORIDE) 10 ML

Mupirocin (BACTROBAN 2% 22 GM OINTMENT)  1 APPLIC 

BID NASAL

Atorvastatin Calcium (LIPITOR) 40 MG 2100 PO

Levalbuterol HCl (Levalbuterol 1.25 mg/3 mL NEB) 

1.25 MG RTQ6H INH

Methylprednisolone Sodium Succinate (Solu-Medrol 

40 MG Vial) 40 MG Q8H IV



Sterile Water (WATER FOR INJECTION) 1 ML ASDIR 

PRN IV

Ipratropium Bromide (ATROVENT) 500 MCG RTQ6H INH

Levalbuterol HCl (Levalbuterol 1.25 mg/3 mL NEB) 

1.25 MG RTQ4H PRN PRN INH



Sterile Water (WATER FOR INJECTION) 1 ML ASDIR 

PRN IV

Budesonide (PULMICORT RESPULES) 0.5 MG RTBID INH

Aspirin (ASPIRIN) 81 MG DAILY PO





Dietitian nutrition assessment

The data set between the solid lines has been 

imported from the dietitian's

assessment.

_________________________________________________

________________



BMI Calculated: 22.4

Nutrition related diagnosis:

Nutrition diagnosis details:

Nutrition problem:

Nutrition etiology:

Nutrition signs and symptoms:

Nutrition prescription:

Dietitian name:

Assessment completed:

_________________________________________________

________________







Physical Exam

General appearance: alert, awake, oriented

Head/Eyes: atraumatic, normocephalic, PERRLA

Neck: full range of motion, supple/no 

meningismus, no bruit/NL carotids, no JVD

Cardiovascular: normal capillary refill, normal 

heart sounds, regular rate

rhythm

Respiratory: aerating well

Abdomen: non-tender, normal bowel sounds, soft

Genitourinary: no bladder distention, no flank 

pain, no urinary catheter

Extremities: no calf tenderness, no clubbing, no 

cyanosis

Musculoskeletal: no CVA tenderness, no muscle 

spasm

Neuro/CNS: alert, oriented X 3, CNII-XII intact



Results

Findings/Data:

Laboratory Tests

426

Chemistry

Sodium (134 - 147 mEq/L) 138

Potassium (3.4 - 5.0 mEq/L) 4.9

Chloride (100 - 108 mEq/L) 100

Carbon Dioxide (21 - 33 mEq/l) 34 H

Anion Gap (0 - 20) 9

BUN (7 - 25 mg/dL) 20

Creatinine (0.6 - 1.3 mg/dL) 0.8

Glomerular Filtr Rate (70 - 80) 95.2 H

Glucose (77 - 141 mg/dL) 127

Calcium (8.0 - 10.5 mg/dL) 9.2

Magnesium (1.6 - 2.6 mg/dL) 2.18

Total Bilirubin (0.0 - 1.0 mg/dL) 0.50

AST (8 - 34 IUnit/L)  32

ALT (10 - 49 IUnit/L) 59 H

Total Alk Phosphatase (20 - 125 IUnit/L) 93

Total Protein (5.7 - 8.2 g/dL) 6.7

Albumin (3.4 - 5.0 g/dL) 3.20 L



Laboratory Tests

425

Hematology

WBC (4.5 - 11.0 x10 3/uL) 9.7

RBC (4.00 - 5.60 x10 6/uL) 4.85

Hgb (12.5 - 16.9 g/dL) 14.9

Hct (37.5 - 50.7 %) 45.4

MCV (81.0 - 99.0 fL) 93.6

MCH (27.0 - 33.0 pg) 30.7

MCHC (33.0 - 37.0 g/dL) 32.8 L

RDW (11.5 - 14.5 %) 14.5

Plt Count (150 - 400 x10 3/uL) 188

MPV (7.0 - 9.0 fL)  10.5 H

Neut % (Auto) (56.0 - 77.0 %) 89.8 H

Lymph % (Auto) (14.0 - 32.0 %) 4.3 L

Mono % (Auto) (4.8 - 9.0 %) 5.3

Eos % (Auto) (0.3 - 3.7 %) 0.0 L

Baso % (Auto) (0.0 - 2.0 %) 0.1

Neut # (Auto) (2.0 - 7.6 x10 3/uL) 8.72 H

Lymph # (Auto) (1.0 - 3.8 x10 3/uL) 0.42 L

Mono # (Auto) (0.1 - 0.8 x10 3/uL) 0.51

Eos # (Auto) (0.0 - 0.2 x10 3/uL) 0.00

Baso # (Auto) (0.0 - 0.2 x10 3/uL) 0.01

Abs Immat Gran (auto) (0.00 - 0.03 x10 3/uL) 0.05 

H

Immature Gran % (0.0 - 2.0 %) 0.5

Nucleated RBC % (0 - 0 %) 0.0

Nucleated RBCs # (Man) (0.0 - 0.1 x10 3/uL) 0.00





Results: labs reviewed, vital signs reviewed, 

vital signs stable, current med

profile rev'd



Treatment Prophylaxis



Treatment Prophylaxis

Oxygen: room air



Diagnosis, Assessment Plan

Hospital course to date:

Assessment and plan:



Multivessel CAD.

Acute COPD excerbation.

SOB due to CAD.

HX of COPD and PNA.



Plan:



CVN1.

Will go for PCI with Impella.

Pulmonary for COPD.

Monitor respirartory status, breathing tx, o2 

support.

IS.

Pain meds.

Antiemetics.

Cough meds.

Follow labs and replace as needed.

Continue home meds.

Monitor.





Code status: full code

Plan discussed with: patient, admitting 

physician, consultants, nurse



Electronically Signed by Jesu Hartman NP on 

25 at 1011

Electronically Signed by Amelia Justice MD on 

25 at 1142



RPT #:[id number]

***END OF REPORT***                                 [facility]

 

                                        2025 14:00:00 

[facility]

Cardiology Progress Note

REPORT#:[id number] REPORT STATUS: Signed

REPORT INITIALIZATION DATE:25 TIME: 1400



PATIENT: EPIFANIO QUINONES UNIT #: [id number]

ACCOUNT#: [id number] ROOM/BED: [place] [address]

: 53 AGE: 69 SEX: M ATTEND: 

Bong Corey MD

ADM DT: 25 AUTHOR: Marcos Buck MD

REPT SERVICE DT/TIME: 25 1400

* ALL edits or amendments must be made on the 

electronic/computer document *





Subjective



Free Text Subj Notes

Free Text Subj Notes:

Doing okay, breathing improved



Objective



General

VS/I O:

24 hour I O ending at 0700:

 0700  1900

Intake Total 400

Output Total

Balance 400



Intake, Oral 400

Number Voids 2



Vital Signs:

Date Time Temp Pulse Resp B/P B/P Pulse O2 O2 

Flow FiO2

Mean Ox Delivery Rate

 1111 98.4 92 16 160/83 0.0 97 Room air

 0930 Nasal 3

cannula

 0822  97 Room air 21

 0659 97.7 76 14 149/74 0.0 98 Nasal

cannula

 0409 98.2 107 14 147/77 99.9 93 Room air

 0033 97.5 86 12 142/72 0.0 97 Nasal

cannula

 0031 85 22 142/72 97 98

 2100 Nasal 3

cannula

2021 99 Room air

 1839 97.9 86 14 167/88 114.1 92 Room air

 1716 98.8 83 20 175/81 0.0 100

 1628 85 28 92

 1600 79 21 94

 1500 92 34 91



PATIENT WEIGHT:



Weight (lb): 147

Weight (oz): 7.83

Weight (kg): 66.900



Medications:

Active Meds + DC'd Last 24 Hrs

Clopidogrel Bisulfate (Plavix) 75 MG DAILY PO

Metoprolol Succinate (TOPROL XL) 50 MG DAILY PO

Amlodipine Besylate (NORVASC) 5 MG BEDTIME PO

Hydralazine HCl (APRESOLINE) 10 MG Q6H PRN PRN IV

Valsartan (DIOVAN 160MG TAB) 160 MG DAILY PO

Heparin Sodium (HEPARIN 5000 UNITS/ML) 5,000 UNIT 

Q12HR SUBQ

Fluticasone Propionate (Flonase Nasal [place]) 2 

SPRAY DAILY NASAL (CKD)



Cefepime HCl (MAXIPIME) 1 GM Q6H IV

Sodium Chloride (SODIUM CHLORIDE) 10 ML

Mupirocin (BACTROBAN 2% 22 GM OINTMENT) 1 APPLIC 

BID NASAL

Atorvastatin Calcium (LIPITOR) 40 MG 2100 PO

Levalbuterol HCl (Levalbuterol 1.25 mg/3 mL NEB) 

1.25 MG RTQ6H INH

Methylprednisolone Sodium Succinate (Solu-Medrol 

40 MG Vial) 40 MG Q8H IV



Sterile Water (WATER FOR INJECTION) 1 ML ASDIR 

PRN IV

Ipratropium Bromide (ATROVENT) 500 MCG RTQ6H INH

Levalbuterol HCl (Levalbuterol 1.25 mg/3 mL NEB) 

1.25 MG RTQ4H PRN PRN INH



Sterile Water (WATER FOR INJECTION) 1 ML ASDIR 

PRN IV

Budesonide (PULMICORT RESPULES) 0.5 MG RTBID INH

Aspirin (ASPIRIN) 81 MG DAILY PO







Physical Exam

General appearance: alert, awake, oriented

Neck: non-tender, no JVD

Cardiovascular:

CV assessment: regular rate and rhythm

Respiratory: on oxygen, shortness of breath, 

wheezing

Abdomen: soft, non-tender, normal bowel sounds, 

no distention

Genitourinary: no urinary catheter

Lower extremity:

LE assessment: no calf tenderness, no edema

Musculoskeletal: normal inspection

Neuro/CNS: alert, oriented X 3, normal speech

Skin: dry, intact, normal color

Psychiatry: normal affect, normal mood



Results

Findings/Data:

Laboratory Tests



0451

Chemistry

Sodium (134 - 147 mEq/L) 138

Potassium (3.4 - 5.0 mEq/L) 4.3

Chloride (100 - 108 mEq/L) 99 L

Carbon Dioxide (21 - 33 mEq/l) 33

Anion Gap (0 - 20) 10

BUN (7 - 25 mg/dL) 18

Creatinine (0.6 - 1.3 mg/dL) 0.9

Glomerular Filtr Rate (70 - 80) 91.9 H

Glucose (77 - 141 mg/dL) 108

Calcium (8.0 - 10.5 mg/dL) 9.0

Magnesium (1.6 - 2.6 mg/dL)  2.28

Total Bilirubin (0.0 - 1.0 mg/dL) 0.60

AST (8 - 34 IUnit/L) 39 H

ALT (10 - 49 IUnit/L)  65 H

Total Alk Phosphatase (20 - 125 IUnit/L) 95

Total Protein (5.7 - 8.2 g/dL) 7.1

Albumin (3.4 - 5.0 g/dL) 3.50



Laboratory Tests



0451

Hematology

WBC (4.5 - 11.0 x10 3/uL) 9.0

RBC (4.00 - 5.60 x10 6/uL) 4.93

Hgb (12.5 - 16.9 g/dL) 14.9

Hct (37.5 - 50.7 %) 46.5

MCV (81.0 - 99.0 fL) 94.3

MCH (27.0 - 33.0 pg) 30.2

MCHC (33.0 - 37.0 g/dL) 32.0 L

RDW (11.5 - 14.5 %)  14.3

Plt Count (150 - 400 x10 3/uL) 195

MPV (7.0 - 9.0 fL) 10.8 H

Neut % (Auto) (56.0 - 77.0 %) 88.9 H

Lymph % (Auto) (14.0 - 32.0 %) 4.9 L

Mono % (Auto) (4.8 - 9.0 %) 5.5

Eos % (Auto) (0.3 - 3.7 %) 0.0 L

Baso % (Auto) (0.0 - 2.0 %) 0.0

Neut # (Auto) (2.0 - 7.6 x10 3/uL) 8.03 H

Lymph # (Auto) (1.0 - 3.8 x10 3/uL) 0.44 L

Mono # (Auto) (0.1 - 0.8 x10 3/uL) 0.50

Eos # (Auto) (0.0 - 0.2 x10 3/uL) 0.00

Baso # (Auto) (0.0 - 0.2 x10 3/uL) 0.00

Abs Immat Gran (auto) (0.00 - 0.03 x10 3/uL) 0.06 

H

Immature Gran % (0.0 - 2.0 %)  0.7

Nucleated RBC % (0 - 0 %) 0.0

Nucleated RBCs # (Man) (0.0 - 0.1 x10 3/uL) 0.00



Laboratory Tests



0451

Chemistry

Magnesium (1.6 - 2.6 mg/dL) 2.28









Diagnosis, Assessment Plan



Free Text DxA P Notes

Free Text DxA P Notes:

68 YO male with MH of COPD, lifelong heavy smoker 

who initially presented at [facility] with shortness of breath attributed to 

COPD exacerbation and

pneumonia. He was eventually discharged but 

presented back to the hospital with

persistent shortness of breath which prompted 

coronary angiogram which revealed

multivessel CAD. The patient is transferred to 

our facility for surgical

revascularization evaluation.



1. Multivessel CAD

* continue ASA and statin

* echo EF 40-44%, G1DD, hypokinesis of the mid 

anteroseptal, apical septal wall

and the apex

* case presented at CV conference, STS 5.6. But 

in light of advanced COPD and O2

dependence he is considered high surgical risk so 

the collective decision is to

proceed with PCI. WIll schedule him for 

Impella-assisted PCI LM-LAD on . loaded him with Plavix 300 mg on 

Friday, 2025, and has been

on Plavix 75 mg daily.



2. COPD exacerbation

lifelong tobacco abuse

recently treated for pneumonia

* pulmonary following



3. Hyperkalemia

* K 5.7->5.4->5.5->4.7

* given Lokelma 10 mg



4. Acute systolic and diastolic HF/Ischemic 

cardiomyopathy

* echo:EF 40-44%, G1DD, hypokinesis of the mid 

anteroseptal, apical septal wall

and the apex

* GDMT as tolerated: metoprolol succinate 50 mg 

daily, Valsartan 160 mg daily



5. Hypertension

* BP trending up

* cotinue metoprolol XL 50 mg daily

* Valsartan 160 mg daily







Electronically Signed by Marcos Buck MD on 

25 at 2355



RPT #:[id number]

***END OF REPORT***                                 [facility]

 

                                        2025 13:26:00 

[facility])

Cardiothoracic Surgery Prog

REPORT#:[id number] REPORT STATUS: Signed

REPORT INITIALIZATION DATE:25 TIME: 1326



PATIENT: EPIFANIO QUINONES UNIT #: [id number]

ACCOUNT#: [id number]  [address] AGE: 69 SEX: M ATTEND: 

Bong Corey MD

ADM DT: 25 AUTHOR: Tanisha Flores Physic

REPT SERVICE DT/TIME: 25 7493

* ALL edits or amendments must be made on the 

electronic/computer document *





Subjective

Chief complaint:

SOB,

Multivessel CAD



Review of Systems

Constitutional:

Reports: fatigue.

Allergy/Immun:

Denies: anaphylaxis, hives, itching.

Eyes:

Denies: diplopia, eye pain.

Respiratory:

Reports: CARRERO (dyspnea on exertion), SOB.

Cardiovascular:

Reports: CARRERO (dyspnea on exertion). Denies: chest 

pain.

GI:

Denies: constipation, diarrhea.

:

Denies: dysuria, hematuria.

Musculoskeletal:

Denies: joint pain, joint swelling.

Heme:

Denies: bleeding, bruising.

All systems rev neg: except as marked



Objective



Physical Exam

HEENT: anicteric, mucosal membranes moist

Neck: full range of motion, non-tender

Cardiovascular: tachycardia

Respiratory: crackles, decreased breath sounds

Abdomen: soft, non-tender

Extremities: dry, moves all

Neuro/CNS: alert, oriented X 3

Skin: dry





Diagnosis, Assessment Plan

Free Text A P:

This is a 69-year-old gentleman with a past 

medical history of COPD, on home

oxygen, 4 L who presented to the hospital at [place] for complaints of

worsening shortness of breath productive cough,. 

Patient was treated for COPD

exacerbation and pneumonia with antibiotics. 

Patient then underwent heart

catheterization at [facility] 

multivessel coronary artery disease.



The patient does admit to productive cough with 

yellow sputum. Patient denies

any history of PE and DVT. Denies any nausea 

vomiting diarrhea.



Patient was transferred to [facility] for 

further workup and evaluation for

coronary bypass graft surgery



Patient reports he continues to smoke 1 pack/day.

Patient denies any alcohol use.



Patient transferred to [facility] for COPD 

exasperation and workup for

multivessel coronary disease



Assessment/plan

1. Coronary disease

2. COPD exacerbation

With recent history of pneumonia

3. Current everyday smoker





Patient will be admitted. Pulmonary consulted for 

COPD. Will order CT of the

chest. Echocardiogram ordered. Consult 

cardiology. Workup underway for

Multivessel CAD.

Further recommendations to follow.



25

Patient resting

Required BiPAP overnight.

Denies chest pains

Workup underway.

Cardiology consulted

Echocardiogram completed. Pending result

CT of the chest

Carotid Doppler shows 50 to 70% stenosis of the 

left carotid. Right carotid

shows less than 50% stenosis

Vein mapping completed

Pulmonary consulted, appreciate input.

Started on azithromycin

Patient seen and examined at [facility]. Workup 

underway. Plan of care discussed

with multidisciplinary team

Pulmonary for COPD exacerbation.

Cardiology consult.

Further recs to follow.





25

Patient resting

Denies chest pains

4L NC, BiPAP while sleeping, encourage I-S and 

deep breathing, started on IV

Solu-Medrol 40 mg Q8 bronchodilators scheduled 

Atrovent, levalbuterol

On IV antibiotics Rocephin and azithromycin

Sputum culture pending

Sinus rhythm

Echocardiogram completed. EF 40-44%, grade 1 

diastolic dysfunction, no AV

regurgitation, trace MR, trace TR.

Carotid Doppler shows 50 to 70% stenosis of the 

left carotid. Right carotid

shows less than 50% stenosis

Vein mapping completed

Patient seen and examined at [facility]. Workup 

underway. Plan of care discussed

with multidisciplinary team

Patient will need to be discussed in the high 

risk clinical conference on

Friday. Further recommendations to follow. 

Patient seen and examined at

[facility]. Plan of care discussed with 

multidisciplinary team



3/1/25

Patient resting comfortable

Denies complaint

On room air

Patient was discussed in the high risk conference 

on Friday. Since this was to

consider PCI next week.

CV surgery will be available if need



Electronically Signed by Tanisha Flores 

on 25 at 1327



RPT #:[id number]

***END OF REPORT***                                 [facility]

 

                                        2025 11:20:00 

[facility] ([facility]

Hospitalist Progress Note

REPORT#:[id number] REPORT STATUS: Signed

REPORT INITIALIZATION DATE:25 TIME: 112



PATIENT: EPIFANIO QUINONES UNIT #: [id number]

ACCOUNT#: [id number] ROOM/BED: [place] [address]

: 53 AGE: 69 SEX: M ATTEND: 

Bong Corey MD

ADM DT: 25 AUTHOR: Jesu Hartman NP

REPT SERVICE DT/TIME: 25 1120

* ALL edits or amendments must be made on the 

electronic/computer document *





Subjective

Chief complaint:

His breathing is stable.

Not in distress.

Right hand pain and swelling better.

No CP, fever, chills.

NO N/v/d.

BP and HR stable.



Review of Systems

Constitutional:

Reports: fatigue, generalized weakness.

Allergy/Immun:

Denies: anaphylaxis, rhinorrhea, sneezing.

Respiratory:

Denies: hemoptysis, pleurisy, pleuritic pain, 

SOB.

Cardiovascular:

Denies: CARRERO (dyspnea on exertion), orthopnea, 

palpitations.

GI:

Denies: anorexia, dysphagia, hematochezia, hiatal 

hernia.

:

Denies: frequency, nocturia, penile lesion.

Heme:

Denies: adenopathy, bleeding, bruising.

Neuro:

Denies: bowel dysfunction, focal weakness, 

headache, numbness, spinning

sensation.



Objective



General

VS/I O:

Vital Signs:

Date Time Temp Pulse Resp B/P B/P Pulse O2 O2 

Flow FiO2

Mean  Ox Delivery Rate

 1111 36.9 92 16 160/83 0.0 97 Room air

 0659 36.5 76 14 149/74 0.0 98 Nasal

cannula

 0409 36.8 107 14 147/77 99.9 93 Room air

 0033 36.4 86 12 142/72 0.0 97 Nasal

cannula

 0031 85 22 142/72 97 98

 2100 Nasal 3

cannula

2021 99 Room air

 1839 36.6 86 14 167/88 114.1 92 Room air

 1716 37.1 83 20 175/81 0.0 100

 1628 85 28 92

 1600 79 21 94

 1500 92 34 91

 1400 75 33 90

 1300 77 29 93

 1200 66 26 97

 1128 36.7 87  20 160/78 105.1 98



24 hour I O ending at 0700:

 0700  1900

Intake Total 400

Output Total

Balance 400



Intake, Oral 400

Number Voids 2



PATIENT WEIGHT:



Weight (lb): 147

Weight (oz): 7.83

Weight (kg): 66.900



Medications:

Active Meds + DC'd Last 24 Hrs

Clopidogrel Bisulfate (Plavix) 75 MG DAILY PO

Metoprolol Succinate (TOPROL XL) 50 MG DAILY PO

Amlodipine Besylate (NORVASC) 5 MG BEDTIME PO

Hydralazine HCl (APRESOLINE) 10 MG Q6H PRN PRN IV

Valsartan (DIOVAN 160MG TAB) 160 MG DAILY PO

Heparin Sodium (HEPARIN 5000 UNITS/ML) 5,000 UNIT 

Q12HR SUBQ

Fluticasone Propionate (Flonase Nasal [place]) 2 

SPRAY DAILY NASAL (CKD)



Cefepime HCl (MAXIPIME) 1 GM Q6H IV

Sodium Chloride (SODIUM CHLORIDE) 10 ML

Mupirocin (BACTROBAN 2% 22 GM OINTMENT) 1 APPLIC 

BID NASAL

Atorvastatin Calcium (LIPITOR) 40 MG 2100 PO

Levalbuterol HCl (Levalbuterol 1.25 mg/3 mL NEB) 

1.25 MG RTQ6H INH

Methylprednisolone Sodium Succinate (Solu-Medrol 

40 MG Vial) 40 MG Q8H IV



Sterile Water (WATER FOR INJECTION) 1 ML ASDIR 

PRN IV

Ipratropium Bromide (ATROVENT) 500 MCG RTQ6H INH

Levalbuterol HCl (Levalbuterol 1.25 mg/3 mL NEB) 

1.25 MG RTQ4H PRN PRN INH



Sterile Water (WATER FOR INJECTION) 1 ML ASDIR 

PRN IV

Budesonide (PULMICORT RESPULES) 0.5 MG RTBID INH

Aspirin (ASPIRIN) 81 MG DAILY PO





Dietitian nutrition assessment

The data set between the solid lines has been 

imported from the dietitian's

assessment.

_________________________________________________

________________



BMI Calculated: 22.4

Nutrition related diagnosis:

Nutrition diagnosis details:

Nutrition problem:

Nutrition etiology:

Nutrition signs and symptoms:

Nutrition prescription:

Dietitian name:

Assessment completed:

_________________________________________________

________________







Physical Exam

General appearance: alert, awake, oriented

Head/Eyes: atraumatic, normocephalic, PERRLA

Neck: full range of motion, supple/no 

meningismus, no bruit/NL carotids, no JVD

Cardiovascular: normal capillary refill, normal 

heart sounds, regular rate

rhythm

Respiratory: aerating well

Abdomen: non-tender, normal bowel sounds, soft

Genitourinary: no bladder distention, no flank 

pain, no urinary catheter

Extremities: no calf tenderness, no clubbing, no 

cyanosis

Musculoskeletal: no CVA tenderness, no muscle 

spasm

Neuro/CNS: alert, oriented X 3, CNII-XII intact



Results

Findings/Data:

Laboratory Tests

451

Chemistry

Sodium (134 - 147 mEq/L) 138

Potassium (3.4 - 5.0 mEq/L)  4.3

Chloride (100 - 108 mEq/L) 99 L

Carbon Dioxide (21 - 33 mEq/l) 33

Anion Gap (0 - 20)  10

BUN (7 - 25 mg/dL) 18

Creatinine (0.6 - 1.3 mg/dL) 0.9

Glomerular Filtr Rate (70 - 80) 91.9 H

Glucose (77 - 141 mg/dL) 108

Calcium (8.0 - 10.5 mg/dL) 9.0

Magnesium (1.6 - 2.6 mg/dL) 2.28

Total Bilirubin (0.0 - 1.0 mg/dL) 0.60

AST (8 - 34 IUnit/L) 39 H

ALT (10 - 49 IUnit/L) 65 H

Total Alk Phosphatase (20 - 125 IUnit/L) 95

Total Protein (5.7 - 8.2 g/dL) 7.1

Albumin (3.4 - 5.0 g/dL) 3.50



Laboratory Tests

451

Hematology

WBC (4.5 - 11.0 x10 3/uL) 9.0

RBC (4.00 - 5.60 x10 6/uL) 4.93

Hgb (12.5 - 16.9 g/dL) 14.9

Hct (37.5 - 50.7 %) 46.5

MCV (81.0 - 99.0 fL)  94.3

MCH (27.0 - 33.0 pg) 30.2

MCHC (33.0 - 37.0 g/dL) 32.0 L

RDW (11.5 - 14.5 %) 14.3

Plt Count (150 - 400 x10 3/uL) 195

MPV (7.0 - 9.0 fL) 10.8 H

Neut % (Auto) (56.0 - 77.0 %) 88.9 H

Lymph % (Auto) (14.0 - 32.0 %) 4.9 L

Mono % (Auto) (4.8 - 9.0 %) 5.5

Eos % (Auto) (0.3 - 3.7 %) 0.0 L

Baso % (Auto) (0.0 - 2.0 %) 0.0

Neut # (Auto) (2.0 - 7.6 x10 3/uL)  8.03 H

Lymph # (Auto) (1.0 - 3.8 x10 3/uL) 0.44 L

Mono # (Auto) (0.1 - 0.8 x10 3/uL) 0.50

Eos # (Auto) (0.0 - 0.2 x10 3/uL) 0.00

Baso # (Auto) (0.0 - 0.2 x10 3/uL) 0.00

Abs Immat Gran (auto) (0.00 - 0.03 x10 3/uL) 0.06 

H

Immature Gran % (0.0 - 2.0 %) 0.7

Nucleated RBC % (0 - 0 %) 0.0

Nucleated RBCs # (Man) (0.0 - 0.1 x10 3/uL) 0.00





Results: labs reviewed, vital signs reviewed, 

vital signs stable, current med

profile rev'd



Treatment Prophylaxis



Treatment Prophylaxis

Oxygen: room air



Diagnosis, Assessment Plan

Hospital course to date:

Assessment and plan:



Multivessel CAD.

Acute COPD excerbation.

SOB due to CAD.

HX of COPD and PNA.



Plan:



CVN1.

PCI vs CABG.

Pulmonary for COPD.

Monitor respirartory status, breathing tx, o2 

support.

IS.

Pain meds.

Antiemetics.

Cough meds.

Follow labs and replace as needed.

Continue home meds.

Monitor.





Code status: full code

Plan discussed with: patient, admitting 

physician, consultants, nurse



Electronically Signed by Jesu Hartman NP on 

25 at 1121

Electronically Signed by Amelia Justice MD on 

25 at 1142



RPT #:[id number]

***END OF REPORT***                                 [facility]

 

                                        2025 10:57:00 

[facility] ([facility])

Pulmonology Progress Note

REPORT#:[id number] REPORT STATUS: Signed

REPORT INITIALIZATION DATE:25 TIME: 



PATIENT: EPIFANIO QUINONES UNIT #: [id number]

ACCOUNT#: [id number] ROOM/BED: 3356-1

: 54 AGE: 69 SEX: M  ATTEND: 

Bong Corey MD

ADM DT: 25 AUTHOR: Lena Henry MD

REPT SERVICE DT/TIME: 25 105

* ALL edits or amendments must be made on the 

electronic/computer document *





Subjective

Chief complaint:

SOB

HPI:

70 yo M with h/o COPD, chronic hypoxia on home 

oxygen who presented for [facility] in

[place] with worsening SOB and productive 

cough. She was treated there for

COPD exacerbation and pneumonia. She had LHC 

which showed mvd. She was

transfered to [facility] for further care. She has had 

productive cough with yellow

sputum. She is a chronic smoker - 1 pack per day. 

Pt has required BIPAP during

hospital course.

Comments:

no adverse events overnight

SOB improving

still having some wheezing on exam

no current complaints



Review of Systems



ROS

All systems rev neg: except as marked



Objective



General

VS/I O:

Last Documented:

Result Date Time

Pulse Ox 98  0659

B/P 149/74  0659

B/P Mean 0.0  0659

O2 Delivery Nasal cannula  0659

Temp 97.7  0659

Pulse 76  0659

Resp 14  0659

O2 Flow Rate 3  2100

FiO2 40  0008



24 hour I O ending at 0700:

 0700  1900

Intake Total 400

Output Total

Balance 400



Intake, Oral 400

Number Voids 2



PATIENT WEIGHT:



Weight (lb): 147

Weight (oz): 7.83

Weight (kg): 66.900



Medications:

Active Meds + DC'd Last 24 Hrs

Clopidogrel Bisulfate (Plavix) 75 MG DAILY PO

Metoprolol Succinate (TOPROL XL) 50 MG DAILY PO

Amlodipine Besylate (NORVASC) 5 MG BEDTIME PO

Hydralazine HCl (APRESOLINE) 10 MG Q6H PRN PRN IV

Valsartan (DIOVAN 160MG TAB) 160 MG DAILY PO

Heparin Sodium (HEPARIN 5000 UNITS/ML) 5,000 UNIT 

Q12HR SUBQ

Fluticasone Propionate (Flonase Nasal [place]) 2 

SPRAY DAILY NASAL (CKD)



Cefepime HCl (MAXIPIME) 1 GM Q6H IV

Sodium Chloride (SODIUM CHLORIDE) 10 ML

Mupirocin (BACTROBAN 2% 22 GM OINTMENT) 1 APPLIC 

BID NASAL

Atorvastatin Calcium (LIPITOR) 40 MG 2100 PO

Levalbuterol HCl (Levalbuterol 1.25 mg/3 mL NEB) 

1.25 MG RTQ6H INH

Methylprednisolone Sodium Succinate (Solu-Medrol 

40 MG Vial) 40 MG Q8H IV



Sterile Water (WATER FOR INJECTION) 1 ML ASDIR 

PRN IV

Ipratropium Bromide (ATROVENT) 500 MCG RTQ6H INH

Levalbuterol HCl (Levalbuterol 1.25 mg/3 mL NEB) 

1.25 MG RTQ4H PRN PRN INH



Sterile Water (WATER FOR INJECTION) 1 ML ASDIR 

PRN IV

Budesonide (PULMICORT RESPULES) 0.5 MG RTBID INH

Aspirin (ASPIRIN) 81 MG DAILY PO







Physical Exam

Head/eyes: atraumatic, normocephalic

ENT:

ENT: normal dentition, normal nose

Neck: full range of motion, non-tender, no 

lymphadenopathy

Cardiovascular: normal heart sounds, normal 

S1/S2, no murmur

Respiratory/chest: on oxygen, wheezing, aerating 

well, clear to auscultation,

symmetric expansion

Abdomen: soft, non-tender, normal bowel sounds

Genitourinary: no bladder distention, no flank 

pain

Extremities: moves all, normal capillary refill

Musculoskeletal: full range of motion, normal 

inspection, painless range of

motion

Neuro/CNS: alert, oriented X 3, CNII-XII intact

Skin: dry, intact





Diagnosis, Assessment  Plan

Free Text A P:

Assessment:

Acute on chronic hypoxic and hypercarbic 

respiratory failure

COPD with acute exacerbation

Acute bronchitis - Pseudomonas infection

Multivessel CAD

Heavy tobacco smoker

Marijuana use



Work-up/Imaging - personally reviewed

CT Chest, 25 - centrilobular emphysema with 

apical predominance, SOILA

scarring, bronchial wall thickening

CXR, 25 - hyperinflation, prominent vascular 

markings

BNP elevated - 174

COVID and Flu negative



Recommendations:

Conitnue steroids - IV Solumedrol 40mg PO q8h

Bronchodilators scheduled, Atrovent and 

Levalbuterol

As needed bronchodilators

Inhaled corticosteroid

Conitnue antibiotics - Cefepime and azithromycin

Sputum culture- Psueomonas

Check VBG

Continue supplemental oxygen, pt on HFNC 4-5L; 

wean to maintain O2 sat 90-94%

BIPAP while sleeping and as needed

Smoking cessation coounseling provided

Pt undergoing CABG evaluation; recommend 

improvement in respiratory status prior

to surgical intervention; pt deemed high risks 

surgical risk; plan for PCI next



DVT ppx - heparin



Thank you for this consult. Will continue to 

follow patient.



Lena Henry MD

Pulmonary Medicine



Electronically Signed by Lena Henry MD on 

25 at 1016



RPT #:[id number]

***END OF REPORT***                                 [facility]

 

                                        2025 09:47:00 

[facility]

Hospitalist Progress Note

REPORT#:[id number] REPORT STATUS: Signed

REPORT INITIALIZATION DATE:25 TIME: 947



PATIENT: EPIFANIO QUINONES UNIT #: [id number]

ACCOUNT#: [id number] ROOM/BED: [place] [address]

: [birth year] AGE: 69 SEX: M ATTEND: 

Bong Corey MD

ADM DT: 25 AUTHOR: Jesu Hartman NP

REPT SERVICE DT/TIME: 25 0947

* ALL edits or amendments must be made on the 

electronic/computer document *





Subjective

Chief complaint:

He is doing better.

No CP, fever, chills.

NO N/v/d.

BP and HR stable.



Review of Systems

Constitutional:

Reports: fatigue, generalized weakness.

Allergy/Immun:

Denies: anaphylaxis, hives, itching, rhinorrhea.

Eyes:

Denies: discharge, diplopia, eye pain.

Respiratory:

Denies: hemoptysis, parox nocturnal dyspnea, 

pleurisy, pleuritic pain.

Cardiovascular:

Denies: CARRERO (dyspnea on exertion), orthopnea, 

palpitations.

GI:

Denies: anorexia, diarrhea, GERD, hematochezia, 

hiatal hernia.

:

Denies: frequency, hematuria, penile discharge, 

testicular swelling.

Musculoskeletal:

Denies: extremity pain, joint swelling, myalgias.

Neuro:

Denies: bowel dysfunction, dizziness, gait 

problem.



Objective



General

VS/I O:

Vital Signs:

Date Time Temp Pulse Resp B/P B/P Pulse O2 O2 

Flow FiO2

Mean Ox Delivery Rate

 0905 37.1 104 20 170/91 0.0 98

 0734 92 Room air

 0600 80 131/68 91 88

 0500 93 22 141/74 101 88

 0400 31

 0400 Room air

 0400 36.4 84 28 139/75 90 Room air

 0400 84 139/75 100 91

 0319 25

 0319 99

 0300 77 21 169/80 115 88

 0204  85 131/70 93 90

 0200 87 30 90

 0130 86 28 88

 0107 18

 0100 75 28 125/59 85 89

 0000 High flow 3

nasal

cannula

 0000 36.4 69 22 130/66 89 Room air

 0000 69 18 130/66 91 91

 2300  74 26 128/68 92 100

 2230 68 18 99

 2215 24

 2215 96

 2200  87 156/83 114

 2145 97

 2133 92 Nasal 3

cannula

 2130 93 48 89

 2100 86 27 141/66 95 97

2000  High flow 3

nasal

cannula

2000 37.0 88 23 144/70  98 High flow 3

nasal

cannula

2000 88 23 144/70 100 98

 1939 90 22 97

 1930 93 99

 1915 98 91

 1900 86 141/76 102 98

 1600 36.8

 1600 High flow 2

nasal

cannula

 1200 37.0

 1200 High flow 2

nasal

cannula



24 hour I O ending at 0700:

 0700  1900

Intake Total 1045.00 750.00

Output Total 600 

Balance 445.00 -1275.00



Intake, IV 45.00 50.00

Intake, Oral 1000 700

Number  1

Bowel

Movements

Number Voids 3

Output, Urine 600 

Patient 66.9 kg

Weight

Weight Standing scale

Measurement

Method



PATIENT WEIGHT:



Weight (lb): 147

Weight (oz): 7.83

Weight (kg): 66.900



Medications:

Active Meds + DC'd Last 24 Hrs

Heparin Sodium (HEPARIN 5000 UNITS/ML) 5,000 UNIT 

Q12HR SUBQ

Azithromycin (ZITHROMAX) 500 MG ONCE ONE PO (DC)

Fluticasone Propionate (Flonase Nasal [place]) 2 

SPRAY DAILY NASAL (CKD)



Cefepime HCl (MAXIPIME) 1 GM Q6H IV

Sodium Chloride (SODIUM CHLORIDE) 10 ML

Mupirocin (BACTROBAN 2% 22 GM OINTMENT) 1 APPLIC 

BID NASAL

Metoprolol Tartrate (LOPRESSOR) 25 MG Q12HR PO

Magnesium Sulfate (MAGNESIUM SULFATE 2GM/SWFI 

50ML) 50 ML ONCE ONE IV (

DC)

Atorvastatin Calcium (LIPITOR) 40 MG 2100 PO

Levalbuterol HCl (Levalbuterol 1.25 mg/3 mL NEB) 

1.25 MG RTQ6H INH

Methylprednisolone Sodium Succinate (Solu-Medrol 

40 MG Vial) 40 MG Q8H IV



Sterile Water (WATER FOR INJECTION) 1 ML ASDIR 

PRN IV

Ipratropium Bromide (ATROVENT) 500 MCG RTQ6H INH

Ceftriaxone Sodium (ROCEPHIN 1000MG VIAL) 1,000 

MG Q24H IV (DC)

Sodium Chloride (SODIUM CHLORIDE) 10 ML

Levalbuterol HCl (Levalbuterol 1.25 mg/3 mL NEB) 

1.25 MG RTQ4H PRN PRN INH



Sterile Water (WATER FOR INJECTION) 1 ML ASDIR 

PRN IV

Budesonide (PULMICORT RESPULES) 0.5 MG RTBID INH

Aspirin (ASPIRIN) 81 MG DAILY PO

Azithromycin (ZITHROMAX) 500 MG Q24H IV (DC)

Sodium Chloride (SODIUM CHLORIDE 0.9%) 250 ML





Dietitian nutrition assessment

The data set between the solid lines has been 

imported from the dietitian's

assessment.

_________________________________________________

________________



BMI Calculated: 22.4

Nutrition related diagnosis:

Nutrition diagnosis details:

Nutrition problem:

Nutrition etiology:

Nutrition signs and symptoms:

Nutrition prescription:

Dietitian name:

Assessment completed:

_________________________________________________

________________







Physical Exam

General appearance: alert, awake, oriented

Head/Eyes: atraumatic, normocephalic, PERRLA

Neck: full range of motion, supple/no 

meningismus, no bruit/NL carotids, no JVD

Cardiovascular: normal capillary refill, normal 

heart sounds, regular rate

rhythm

Respiratory: aerating well

Abdomen: non-tender, normal bowel sounds, soft

Genitourinary: no bladder distention, no flank 

pain, no urinary catheter

Extremities: no calf tenderness, no clubbing, no 

cyanosis

Musculoskeletal: no CVA tenderness, no muscle 

spasm

Neuro/CNS: alert, oriented X 3, CNII-XII intact



Results

Findings/Data:

Laboratory Tests

353

Chemistry

Sodium (134 - 147 mEq/L) 137

Potassium (3.4 - 5.0 mEq/L) 4.3

Chloride (100 - 108 mEq/L) 97 L

Carbon Dioxide (21 - 33 mEq/l) 38 H

Anion Gap (0 - 20) 6

BUN (7 - 25 mg/dL) 23

Creatinine (0.6 - 1.3 mg/dL) 0.9

Glomerular Filtr Rate (70 - 80) 91.9 H

Glucose (77 - 141 mg/dL) 116

Calcium (8.0 - 10.5 mg/dL) 8.7

Ionized Calcium Barrett (1.09 - 1.30 MMOL/L) 1.14

Phosphorus (2.5 - 4.9 MG/DL) 2.7

Magnesium (1.6 - 2.6 mg/dL) 2.33

Total Bilirubin (0.0 - 1.0 mg/dL) 0.50

AST (8 - 34 IUnit/L) 50 H

ALT (10 - 49 IUnit/L) 65 H

Total Alk Phosphatase (20 - 125 IUnit/L) 85

Total Protein (5.7 - 8.2 g/dL) 6.5

Albumin (3.4 - 5.0 g/dL) 3.20 L



Laboratory Tests

353

Hematology

WBC (4.5 - 11.0 x10 3/uL) 9.5

RBC (4.00 - 5.60 x10 6/uL) 4.44

Hgb (12.5 - 16.9 g/dL) 13.9

Hct (37.5 - 50.7 %) 42.0

MCV (81.0 - 99.0 fL) 94.6

MCH (27.0 - 33.0 pg)  31.3

MCHC (33.0 - 37.0 g/dL) 33.1

RDW (11.5 - 14.5 %) 14.4

Plt Count (150 - 400 x10 3/uL) 190

MPV (7.0 - 9.0 fL) 10.5 H

Neut % (Auto) (56.0 - 77.0 %) 88.2 H

Lymph % (Auto) (14.0 - 32.0 %) 5.0 L

Mono % (Auto) (4.8 - 9.0 %) 5.9

Eos % (Auto) (0.3 - 3.7 %) 0.0 L

Baso % (Auto) (0.0 - 2.0 %) 0.1

Neut # (Auto) (2.0 - 7.6 x10 3/uL) 8.36 H

Lymph # (Auto) (1.0 - 3.8 x10 3/uL) 0.47 L

Mono # (Auto) (0.1 - 0.8 x10 3/uL) 0.56

Eos # (Auto) (0.0 - 0.2 x10 3/uL) 0.00

Baso # (Auto) (0.0 - 0.2 x10 3/uL)  0.01

Abs Immat Gran (auto) (0.00 - 0.03 x10 3/uL) 0.08 

H

Immature Gran % (0.0 - 2.0 %) 0.8

Nucleated RBC % (0 - 0 %) 0.0

Nucleated RBCs # (Man) (0.0 - 0.1 x10 3/uL) 0.00





Results: labs reviewed, vital signs reviewed, 

vital signs stable, current med

profile rev'd



Treatment Prophylaxis



Treatment Prophylaxis

Oxygen: room air



Diagnosis, Assessment Plan

Hospital course to date:

Assessment and plan:



Multivessel CAD.

Acute COPD excerbation.

SOB due to CAD.

HX of COPD and PNA.



Plan:



CVN1.

PCI vs CABG.

Pulmonary for COPD.

Monitor respirartory status, breathing tx, o2 

support.

IS.

Pain meds.

Antiemetics.

Cough meds.

Follow labs and replace as needed.

Continue home meds.

Monitor.





Code status: full code

Plan discussed with: patient, admitting 

physician, consultants, nurse



Electronically Signed by Jesu Hartman NP on 

25 at 0950

Electronically Signed by Amelia Justice MD on 

25 at 1344



RPT #:[id number]

***END OF REPORT***                                 [facility]

 

                                        2025 09:39:00 

[facility] ([facility])

Pulmonology Progress Note

REPORT#:[id number] REPORT STATUS: Signed

REPORT INITIALIZATION DATE:25 TIME: 939



PATIENT: EPIFANIO QUINONES UNIT #: [id number]

ACCOUNT#: [id number] ROOM/BED: [place] [address]

: [birth year] AGE: 69 SEX: M ATTEND: 

Bong Corey MD

ADM DT: 25 AUTHOR: Lena Henry MD

REPT SERVICE DT/TIME: 25 0939

* ALL edits or amendments must be made on the 

electronic/computer document *





Subjective

Chief complaint:

SOB

HPI:

70 yo M with h/o COPD, chronic hypoxia on home 

oxygen who presented for [facility] in

[place] with worsening SOB and productive 

cough. She was treated there for

COPD exacerbation and pneumonia. She had LHC 

which showed mvd. She was

transfered to [facility] for further care. She has had 

productive cough with yellow

sputum. She is a chronic smoker - 1 pack per day. 

Pt has required BIPAP during

hospital course.

Comments:

no adverse events reported overnight

pt on NC

SOB/wheezing improving



ROS

All systems rev neg: except as marked



Review of Systems



ROS

All systems rev neg: except as marked



Objective



General

VS/I O:

Last Documented:

Result  Date Time

Pulse Ox 98 905

B/P 170/91 905

B/P Mean 0.0 905

Temp 98.8 905

Pulse 104 905

Resp 20 905

O2 Delivery Room air  0734

O2 Flow Rate 3  0000

FiO2 40  0008



24 hour I O ending at 0700:

 0700  1900

Intake Total 1045.00 750.00

Output Total 600 

Balance 445.00 -1275.00



Intake, IV 45.00 50.00

Intake, Oral 1000 700

Number 1

Bowel

Movements

Number Voids 3

Output, Urine 600 

Patient 66.9 kg

Weight

Weight Standing scale

Measurement

Method



PATIENT WEIGHT:



Weight (lb): 147

Weight (oz): 7.83

Weight (kg): 66.900



Medications:

Active Meds + DC'd Last 24 Hrs

Heparin Sodium (HEPARIN 5000 UNITS/ML) 5,000 UNIT 

Q12HR SUBQ

Azithromycin (ZITHROMAX) 500 MG ONCE ONE PO (DC)

Fluticasone Propionate (Flonase Nasal [place]) 2 

SPRAY DAILY NASAL (CKD)



Cefepime HCl (MAXIPIME) 1 GM Q6H IV

Sodium Chloride (SODIUM CHLORIDE) 10 ML

Mupirocin (BACTROBAN 2% 22 GM OINTMENT) 1 APPLIC 

BID NASAL

Metoprolol Tartrate (LOPRESSOR) 25 MG Q12HR PO

Magnesium Sulfate (MAGNESIUM SULFATE 2GM/SWFI 

50ML) 50 ML ONCE ONE IV (

DC)

Atorvastatin Calcium (LIPITOR) 40 MG 2100 PO

Levalbuterol HCl (Levalbuterol 1.25 mg/3 mL NEB) 

1.25 MG RTQ6H INH

Methylprednisolone Sodium Succinate (Solu-Medrol 

40 MG Vial)  40 MG Q8H IV



Sterile Water (WATER FOR INJECTION) 1 ML ASDIR 

PRN IV

Ipratropium Bromide (ATROVENT) 500 MCG RTQ6H INH

Ceftriaxone Sodium (ROCEPHIN 1000MG VIAL) 1,000 

MG Q24H IV (DC)

Sodium Chloride (SODIUM CHLORIDE) 10 ML

Levalbuterol HCl (Levalbuterol 1.25 mg/3 mL NEB) 

1.25 MG RTQ4H PRN PRN INH



Sterile Water (WATER FOR INJECTION) 1 ML ASDIR 

PRN IV

Budesonide (PULMICORT RESPULES) 0.5 MG RTBID INH

Aspirin (ASPIRIN) 81 MG DAILY PO

Azithromycin (ZITHROMAX) 500 MG Q24H IV (DC)

Sodium Chloride (SODIUM CHLORIDE 0.9%) 250 ML







Physical Exam

Head/eyes: atraumatic, normocephalic

ENT:

ENT: normal dentition, normal nose

Neck: full range of motion, non-tender, no 

lymphadenopathy

Cardiovascular: normal heart sounds, normal 

S1/S2, no murmur

Respiratory/chest: on oxygen, wheezing, aerating 

well, clear to auscultation,

symmetric expansion

Abdomen: soft, non-tender, normal bowel sounds

Genitourinary: no bladder distention, no flank 

pain

Extremities: moves all, normal capillary refill

Musculoskeletal: full range of motion, normal 

inspection, painless range of

motion

Neuro/CNS: alert, oriented X 3, CNII-XII intact

Skin: dry, intact





Diagnosis, Assessment Plan

Free Text A P:

Assessment:

Acute on chronic hypoxic and hypercarbic 

respiratory failure

COPD with acute exacerbation

Acute bronchitis - Pseudomonas infection

Multivessel CAD

Heavy tobacco smoker

Marijuana use



Work-up/Imaging - personally reviewed

CT Chest, 25 - centrilobular emphysema with 

apical predominance, SOILA

scarring, bronchial wall thickening

CXR, 25 - hyperinflation, prominent vascular 

markings

BNP elevated - 174

COVID and Flu negative



Recommendations:

Pt currently in acute COPD exacerbation; still 

has wheezing on exam, but SOB

improving

conitnue steroids - IV Solumedrol 40mg PO q8h

Bronchodilators scheduled, Atrovent and 

Levalbuterol

As needed bronchodilators

Inhaled corticosteroid

Conitnue antibiotics - Cefepime and azithromycin

Sputum culture- Psueomonas

Check VBG

Continue supplemental oxygen, pt on HFNC 4-5L; 

wean to maintain O2 sat 90-94%

BIPAP while sleeping and as needed

Smoking cessation coounseling provided

Pt undergoing CABG evaluation; recommend 

improvement in respiratory status prior

to surgical intervention



Thank you for this consult. Will continue to 

follow patient.



Lena Henry MD

Pulmonary Medicine



Electronically Signed by Lena Henry MD on 

25 at 1651



[facility] #:[id number]

***END OF REPORT***                                 [facility]

 

                                        2025 08:27:00 

[facility] ([facility])

Cardiology Progress Note

REPORT#:[id number] REPORT STATUS: Signed

REPORT INITIALIZATION DATE:25 TIME: 827



PATIENT: EPIFANIO QUINONES UNIT #: [id number]

ACCOUNT#: [id number] [facility]

: 54 AGE: 69 SEX: M ATTEND: 

Bong Corey MD

ADM DT: 25 AUTHOR: Lena Fritz 

AGACNP

REPT SERVICE DT/TIME: 25

* ALL edits or amendments must be made on the 

electronic/computer document *





Subjective

Patient reports:

Yes: fatigue, shortness of breath.



Objective



General

VS/I O:

24 hour I O ending at 0700:

 0700  1900

Intake Total 1045.00 750.00

Output Total 600 

Balance 445.00 -1275.00



Intake, IV 45.00 50.00

Intake, Oral 1000 700

Number 1

Bowel

Movements

Number Voids 3

Output, Urine 600 

Patient  66.9 kg

Weight

Weight Standing scale

Measurement

Method

Vital Signs

Date  Temp Pulse Resp B/P B/P Mean Pulse Ox FiO2

- 36.4-37.1  18-48 125-170/59-91 

0.0-115 





PATIENT WEIGHT:



Weight (lb): 147

Weight (oz): 7.83

Weight (kg): 66.900



Medications:

Active Meds + DC'd Last 24 Hrs

Heparin Sodium (HEPARIN 5000 UNITS/ML) 5,000 UNIT 

Q12HR SUBQ

Azithromycin (ZITHROMAX) 500 MG ONCE ONE PO (DC)

Fluticasone Propionate (Flonase Nasal [place]) 2 

SPRAY DAILY NASAL (CKD)



Cefepime HCl (MAXIPIME) 1 GM Q6H IV

Sodium Chloride (SODIUM CHLORIDE) 10 ML

Mupirocin (BACTROBAN 2% 22 GM OINTMENT) 1 APPLIC 

BID NASAL

Metoprolol Tartrate (LOPRESSOR) 25 MG Q12HR PO

Furosemide (LASIX 20MG INJ) 20 MG ONCE ONE IV 

(DC)

Magnesium Sulfate (MAGNESIUM SULFATE 2GM/SWFI 

50ML) 50 ML ONCE ONE IV (

DC)

Atorvastatin Calcium (LIPITOR) 40 MG 2100 PO

Levalbuterol HCl (Levalbuterol 1.25 mg/3 mL NEB) 

1.25 MG RTQ6H INH

Methylprednisolone Sodium Succinate (Solu-Medrol 

40 MG Vial) 40 MG Q8H IV



Sterile Water (WATER FOR INJECTION) 1 ML ASDIR 

PRN IV

Ipratropium Bromide (ATROVENT) 500 MCG RTQ6H INH

Ceftriaxone Sodium (ROCEPHIN 1000MG VIAL) 1,000 

MG Q24H IV (DC)

Sodium Chloride (SODIUM CHLORIDE) 10 ML

Levalbuterol HCl (Levalbuterol 1.25 mg/3 mL NEB) 

1.25 MG RTQ4H PRN PRN INH



Metoprolol Tartrate (LOPRESSOR) 12.5 MG Q12HR PO 

(DC)

Sterile Water (WATER FOR INJECTION) 1 ML ASDIR 

PRN IV

Budesonide (PULMICORT RESPULES) 0.5 MG RTBID INH

Aspirin (ASPIRIN) 81 MG DAILY PO

Azithromycin (ZITHROMAX) 500 MG Q24H IV (DC)

Sodium Chloride (SODIUM CHLORIDE 0.9%) 250 ML







Physical Exam

General appearance: chronically ill appearing, 

frail, alert, awake, oriented

Neck: non-tender, no JVD

Cardiovascular:

CV assessment: regular rate and rhythm

Respiratory: on oxygen, shortness of breath, 

wheezing

Abdomen: soft, non-tender, normal bowel sounds, 

no distention

Genitourinary: no urinary catheter

Lower extremity:

LE assessment: no calf tenderness, no edema

Musculoskeletal: normal inspection

Neuro/CNS: alert, oriented X 3, normal speech

Skin: dry, intact, normal color

Psychiatry: normal affect, normal mood



Results

Findings/Data:

Laboratory Tests



0353

Chemistry

Sodium (134 - 147 mEq/L) 137

Potassium (3.4 - 5.0 mEq/L) 4.3

Chloride (100 - 108 mEq/L) 97 L

Carbon Dioxide (21 - 33 mEq/l) 38 H

Anion Gap (0 - 20) 6

BUN (7 - 25 mg/dL) 23

Creatinine (0.6 - 1.3 mg/dL) 0.9

Glomerular Filtr Rate (70 - 80) 91.9 H

Glucose (77 - 141 mg/dL) 116

Calcium (8.0 - 10.5 mg/dL) 8.7

Ionized Calcium Barrett (1.09 - 1.30 MMOL/L) 1.14

Phosphorus (2.5 - 4.9 MG/DL) 2.7

Magnesium (1.6 - 2.6 mg/dL) 2.33

Total Bilirubin (0.0 - 1.0 mg/dL) 0.50

AST (8 - 34 IUnit/L)  50 H

ALT (10 - 49 IUnit/L) 65 H

Total Alk Phosphatase (20 - 125 IUnit/L) 85

Total Protein (5.7 - 8.2 g/dL)  6.5

Albumin (3.4 - 5.0 g/dL) 3.20 L



Laboratory Tests



0353

Hematology

WBC (4.5 - 11.0 x10 3/uL) 9.5

RBC (4.00 - 5.60 x10 6/uL) 4.44

Hgb (12.5 - 16.9 g/dL) 13.9

Hct (37.5 - 50.7 %) 42.0

MCV (81.0 - 99.0 fL) 94.6

MCH (27.0 - 33.0 pg) 31.3

MCHC (33.0 - 37.0 g/dL) 33.1

RDW (11.5 - 14.5 %) 14.4

Plt Count (150 - 400 x10 3/uL) 190

MPV (7.0 - 9.0 fL)  10.5 H

Neut % (Auto) (56.0 - 77.0 %) 88.2 H

Lymph % (Auto) (14.0 - 32.0 %) 5.0 L

Mono % (Auto) (4.8 - 9.0 %)  5.9

Eos % (Auto) (0.3 - 3.7 %) 0.0 L

Baso % (Auto) (0.0 - 2.0 %) 0.1

Neut # (Auto) (2.0 - 7.6 x10 3/uL) 8.36 H

Lymph # (Auto) (1.0 - 3.8 x10 3/uL) 0.47 L

Mono # (Auto) (0.1 - 0.8 x10 3/uL) 0.56

Eos # (Auto) (0.0 - 0.2 x10 3/uL) 0.00

Baso # (Auto) (0.0 - 0.2 x10 3/uL) 0.01

Abs Immat Gran (auto) (0.00 - 0.03 x10 3/uL) 0.08 

H

Immature Gran % (0.0 - 2.0 %) 0.8

Nucleated RBC % (0 - 0 %) 0.0

Nucleated RBCs # (Man) (0.0 - 0.1 x10 3/uL) 0.00



Laboratory Tests



0353

Chemistry

Magnesium (1.6 - 2.6 mg/dL) 2.33





Results: labs reviewed, vital signs reviewed, 

rhythm personally rev'd

Telemetry Interpretation:

NSR





Diagnosis, Assessment Plan

Plan discussed with: patient, collaborating MD, 

nurse



Free Text DxA P Notes

Free Text DxA P Notes:

68 YO male with MH of COPD, lifelong heavy smoker 

who initially presented at [facility] with shortness of breath attributed to 

COPD exacerbation and

pneumonia. He was eventually discharged but 

presented back to the hospital with

persistent shortness of breath which prompted 

coronary angiogram which revealed

multivessel CAD. The patient is transferred to 

our facility for surgical

revascularization evaluation.



1. Multivessel CAD

* continue ASA and statin

* echo EF 40-44%, G1DD, hypokinesis of the mid 

anteroseptal, apical septal wall

and the apex

* case presented at CV conference, STS 5.6. But 

in light of advanced COPD and O2

dependence he is considered high surgical risk so 

the collective decision is to

proceed with PCI. WIll schedule him for 

Impella-assisted PCI LM-LAD on . Will load him with Plavix 300 mg 

today then 75 mg daily.



2. COPD exacerbation

lifelong tobacco abuse

recently treated for pneumonia

* pulmonary following



3. Hyperkalemia

* K 5.7->5.4->5.5->4.7

* given Lokelma 10 mg



4. Acute systolic and diastolic HF/Ischemic 

cardiomyopathy

* echo:EF 40-44%, G1DD, hypokinesis of the mid 

anteroseptal, apical septal wall

and the apex

* GDMT as tolerated: metoprolol succinate 50 mg 

daily, Valsartan 160 mg daily



5. Hypertension

* BP trending up

* cotinue metoprolol XL 50 mg daily

* add Valsartan 160 mg daily



MDM by Dr. Buck.





Electronically Signed by Lena Fritz on 25 at 1029

Electronically Signed by Marcos Buck MD on 

25 at 1838



RPT #:[id number]

***END OF REPORT***                                 [facility]

 

                                        2025 07:07:00 

[facility] ([facility])

Clinical Note

REPORT#:[id number] REPORT STATUS: Signed

REPORT INITIALIZATION DATE:25 TIME: 707



PATIENT: EPIFANIO QUINONES UNIT #: [id number]

ACCOUNT#: [id number] ROOM/BED: [facility]1-1

: 54 AGE: 69 SEX: M ATTEND: 

Bong Corey MD

ADM DT: 25 AUTHOR: Zulema Vallejo

REPT SERVICE DT/TIME: 25 0707

* ALL edits or amendments must be made on the 

electronic/computer document *





Clinical Note

Note:

Procedure Type: Isolated CABG

Perioperative Outcome Estimate %

Operative Mortality 5.16%

Morbidity Mortality 17.9%

Stroke 1.09%

Renal Failure 1.7%

Reoperation 3.56%

Prolonged Ventilation 14.5%

Deep Sternal Wound Infection 0.154%

Long Hospital Stay (>14 days) 18%

Short Hospital Stay (<6 days)* 18%

*higher values reflect a better outcome



Electronically Signed by Zulema Vallejo 

on 25 at 0712

Electronically Signed by Bong Corey MD on 

25 at 1321



RPT #:[id number]

***END OF REPORT***                                 [facility]

 

                                        2025 15:41:00 

[facility]

Pulmonology Progress Note

REPORT#:[id number] REPORT STATUS: Signed

REPORT INITIALIZATION DATE:25 TIME: 154



PATIENT: EPIFANIO QUINONES UNIT #: [id number]

ACCOUNT#: [id number] ROOM/BED: [id number]

: 53 AGE: 69 SEX: M ATTEND: 

Bong Corey MD

ADM DT: 25 AUTHOR: Lena Henry MD

REPT SERVICE DT/TIME: 25 1541

* ALL edits or amendments must be made on the 

electronic/computer document *





Subjective

Chief complaint:

SOB

HPI:

70 yo M with h/o COPD, chronic hypoxia on home 

oxygen who presented for [facility] in

[place] with worsening SOB and productive 

cough. She was treated there for

COPD exacerbation and pneumonia. She had LHC 

which showed mvd. She was

transfered to [facility] for further care. She has had 

productive cough with yellow

sputum. She is a chronic smoker - 1 pack per day. 

Pt has required BIPAP during

hospital course.

Comments:

pt still wheezing on exam

on 4L NC; saturating low 90's

pt reports feeling some improvement

still has some SOB; also reports nasal drainage



Review of Systems



ROS

All systems rev neg: except as marked



Objective



General

VS/I O:

Last Documented:

Result Date Time

Temp 98.6  1200

O2 Delivery High flow nasal cannula  1200

O2 Flow Rate 2  1200

Pulse Ox 89  0835

FiO2 40  0008

Pulse 69  0008

Resp 47  1800

B/P 143/81  1348

B/P Mean  106  1348



24 hour I O ending at 0700:

 0700  1900

Intake Total 750.00 1560.00

Output Total 650 785

Balance 100.00 775.00



Intake, IV 30.00 260.00

Intake, Oral 720 1300

Intake, Oral 0

Supplement

Number  0

Bowel

Movements

Number 1

Incontinent

Voids

Number Voids 0

Output, Stool 0

Output, Urine 650 785

Patient 67 kg

Weight

Weight Standing scale

Measurement

Method



PATIENT WEIGHT:



Weight (lb): 147

Weight (oz): 11.36

Weight (kg): 67.000



Medications:

Active Meds + DC'd Last 24 Hrs

Heparin Sodium (HEPARIN 5000 UNITS/ML) 5,000 UNIT 

Q12HR SUBQ

Azithromycin (ZITHROMAX) 500 MG ONCE ONE PO

Cefepime HCl (MAXIPIME) 1 GM Q6H IV

Sodium Chloride (SODIUM CHLORIDE)  10 ML

Mupirocin (BACTROBAN 2% 22 GM OINTMENT) 1 APPLIC 

BID NASAL

Metoprolol Tartrate (LOPRESSOR) 25 MG Q12HR PO

Furosemide (LASIX 20MG INJ) 20 MG ONCE ONE IV 

(DC)

Magnesium Sulfate (MAGNESIUM SULFATE 2GM/SWFI 

50ML) 50 ML ONCE ONE IV (

DC)

Atorvastatin Calcium (LIPITOR) 40 MG 2100 PO

Levalbuterol HCl (Levalbuterol 1.25 mg/3 mL NEB) 

1.25 MG RTQ6H INH

Methylprednisolone Sodium Succinate (Solu-Medrol 

40 MG Vial) 40 MG Q8H IV



Sterile Water (WATER FOR INJECTION) 1 ML ASDIR 

PRN IV

Ipratropium Bromide (ATROVENT) 500 MCG RTQ6H INH

Ceftriaxone Sodium (ROCEPHIN 1000MG VIAL) 1,000 

MG Q24H IV (DC)

Sodium Chloride (SODIUM CHLORIDE) 10 ML

Levalbuterol HCl (Levalbuterol 1.25 mg/3 mL NEB) 

1.25 MG RTQ4H PRN PRN INH



Metoprolol Tartrate (LOPRESSOR) 12.5 MG Q12HR PO 

(DC)

Sterile Water (WATER FOR INJECTION) 1 ML ASDIR 

PRN IV

Budesonide (PULMICORT RESPULES) 0.5 MG RTBID INH

Aspirin (ASPIRIN) 81 MG DAILY PO

Azithromycin (ZITHROMAX) 500 MG Q24H IV (DC)

Sodium Chloride (SODIUM CHLORIDE 0.9%) 250 ML







Physical Exam

Head/eyes: atraumatic, normocephalic

ENT:

ENT: normal dentition, normal nose

Neck: full range of motion, non-tender, no 

lymphadenopathy

Cardiovascular: normal heart sounds, normal 

S1/S2, no murmur

Respiratory/chest: on oxygen, wheezing, aerating 

well, clear to auscultation,

symmetric expansion

Abdomen: soft, non-tender, normal bowel sounds

Genitourinary: no bladder distention, no flank 

pain

Extremities: moves all, normal capillary refill

Musculoskeletal: full range of motion, normal 

inspection, painless range of

motion

Neuro/CNS: alert, oriented X 3, CNII-XII intact

Skin: dry, intact





Diagnosis, Assessment Plan

Free Text A P:

Assessment:

Acute on chronic hypoxic and hypercarbic 

respiratory failure

COPD with acute exacerbation

Acute bronchitis

Multivessel CAD

Heavy tobacco smoker

Marijuana use



Work-up/Imaging - personally reviewed

CT Chest, 25 - centrilobular emphysema with 

apical predominance, SOILA

scarring, bronchial wall thickening

CXR, 25 - hyperinflation, prominent vascular 

markings

BNP elevated - 174

COVID and Flu negative



Recommendations:

Pt currently in acute COPD exacerbation; still 

has wheezing on exam.

conitnue steroids - IV Solumedrol 40mg PO q8h

Bronchodilators scheduled, Atrovent and 

Levalbuterol

As needed bronchodilators

Inhaled corticosteroid

IV antibiotics - Cefepime and azithromycin

Sputum culture- GNRs

Check VBG

Continue supplemental oxygen, pt on HFNC 4-5L; 

wean to maintain O2 sat 90-94%

BIPAP while sleeping and as needed

Smoking cessation coounseling provided

Pt undergoing CABG evaluation; recommend 

improvement in respiratory status prior

to surgical interventionfluticas



Thank you for this consult. Will continue to 

follow patient.

Plan discussed with pt, pt's family and ICU team.



Lena Henry MD

Pulmonary Medicine



Electronically Signed by Lena Henry MD on 

25 at 1544



[facility] #:[id number]

***END OF REPORT***                                 [facility]

 

                                        2025 12:07:00 

[facility] ([facility]

Critical Care Progress Note

REPORT#:[id number] REPORT STATUS: Signed

REPORT INITIALIZATION DATE:25 TIME: 1207



PATIENT: EPIFANIO QUINONES UNIT #: [id number]

ACCOUNT#: [id number] ROOM/BED: [place] [address]

: [birth year] AGE: 69 SEX: M ATTEND: 

Bong Corey MD

ADM DT: 25 AUTHOR: Delphine Stock MD

REPT SERVICE DT/TIME: 25 1207

* ALL edits or amendments must be made on the 

electronic/computer document *





Subjective

Chief complaint:

cough, sob, fatigue

HPI:

Patient is a 69-year-old male with an extensive 

history of chronic hypoxic

respiratory failure, COPD on home oxygen 2 to 4 

L. Patient also is an everyday

smoker of 1 pack/day.  Patient was admitted at 

outside hospital  and

treated for COPD exacerbation and pneumonia with 

antibiotics, breathing

treatments, steroids. He left the hospital after 

4 days. He Nahed presented to

the hospital  with yellow sputum 

production, cough, shortness of

breath, fatigue. Again being treated for COPD 

exacerbation. He underwent

coronary angiography this morning and was found 

to have multivessel coronary

artery disease. He was transferred here for 

evaluation by CT surgery for

possible CABG evaluation. At the time he is 

hemodynamically stable although

hypoxic on 5 L nasal cannula. He is not having 

any chest pain. He does have a

productive cough with yellow sputum. He is 

slightly tachycardic in the low 100s

after receiving breathing treatments and with any 

exertion or transferring in

bed. He denies fever, lower extremity swelling, 

history of PE or DVT,

hemoptysis, recent travel, nausea, vomiting, 

diarrhea, abdominal pain, rash,

sick contacts. He had a negative influenza and 

COVID swab outside hospital. He

has a workup pending for CABG evaluation and is 

currently being treated for COPD

exacerbation. Will discuss further with 

pulmonology and CT surgery about

steroid plan.

Comments:

Interval history-



Patient slightly improved from the COPD 

standpoint.  Heart rate is still

elevated. Will increase it to 25 twice daily. 

Will give 1 dose of Lasix 20 IV.



Objective



General

VS/I O

Last Documented:

Result Date Time

Pulse Ox 89  0835

O2 Delivery Room air  0835

Temp  36.6  0400

FiO2 40  0008

Pulse 69  0008

O2 Flow Rate 6  1941

Resp 47  1800

B/P 143/81  1348

B/P Mean 106  1348



24 hour I O ending at 0700:

 0700  1900

Intake Total 750.00 1560.00

Output Total  650 785

Balance 100.00 775.00



Intake, IV 30.00 260.00

Intake, Oral 720 1300

Intake, Oral 0

Supplement

Number 0

Bowel

Movements

Number 1

Incontinent

Voids

Number Voids 0

Output, Stool 0

Output, Urine 650 785

Patient 67 kg

Weight

Weight Standing scale

Measurement

Method



PATIENT WEIGHT:



Weight (lb): 147

Weight (oz): 11.36

Weight (kg): 67.000



Medications:

Active Meds + DC'd Last 24 Hrs

Heparin Sodium (HEPARIN 5000 UNITS/ML) 5,000 UNIT 

 Q12HR SUBQ

Azithromycin (ZITHROMAX) 500 MG ONCE ONE PO

Mupirocin (BACTROBAN 2% 22 GM OINTMENT) 1 APPLIC 

BID NASAL

Metoprolol Tartrate (LOPRESSOR) 25 MG  Q12HR PO

Furosemide (LASIX 20MG INJ) 20 MG ONCE ONE IV 

(DC)

Magnesium Sulfate (MAGNESIUM SULFATE 2GM/SWFI 

50ML) 50 ML ONCE ONE IV (

DC)

Atorvastatin Calcium (LIPITOR) 40 MG 2100 PO

Levalbuterol HCl (Levalbuterol 1.25 mg/3 mL NEB) 

1.25 MG RTQ6H INH

Methylprednisolone Sodium Succinate (Solu-Medrol 

40 MG Vial) 40 MG Q8H IV



Sterile Water (WATER FOR INJECTION) 1 ML ASDIR 

PRN IV

Ipratropium Bromide (ATROVENT) 500 MCG RTQ6H INH

Ceftriaxone Sodium (ROCEPHIN 1000MG VIAL) 1,000 

MG Q24H IV

Sodium Chloride (SODIUM CHLORIDE) 10 ML

Levalbuterol HCl (Levalbuterol 1.25 mg/3 mL NEB) 

1.25 MG RTQ4H PRN PRN INH



Metoprolol Tartrate (LOPRESSOR) 12.5 MG Q12HR PO 

(DC)

Sterile Water (WATER FOR INJECTION) 1 ML ASDIR 

PRN IV

Budesonide (PULMICORT RESPULES) 0.5 MG RTBID INH

Aspirin (ASPIRIN)  81 MG DAILY PO

Azithromycin (ZITHROMAX) 500 MG Q24H IV (DC)

Sodium Chloride (SODIUM CHLORIDE 0.9%) 250 ML







Results

Findings/data:

Laboratory Tests



0509

Chemistry

Sodium (134 - 147 mEq/L) 141

Potassium (3.4 - 5.0 mEq/L) 4.7

Chloride (100 - 108 mEq/L) 107

Carbon Dioxide (21 - 33 mEq/l) 31

Anion Gap (0 - 20) 8

BUN (7 - 25 mg/dL) 23

Creatinine (0.6 - 1.3 mg/dL) 0.8

Glomerular Filtr Rate (70 - 80) 95.2 H

Glucose (77 - 141 mg/dL) 128

Calcium (8.0 - 10.5 mg/dL) 6.9 L

Magnesium (1.6 - 2.6 mg/dL) 1.87



Laboratory Tests



0405

Hematology

WBC (4.5 - 11.0 x10 3/uL) 8.5

RBC (4.00 - 5.60 x10 6/uL) 3.53 L

Hgb (12.5 - 16.9 g/dL) 10.6 L

Hct (37.5 - 50.7 %) 34.1 L

MCV (81.0 - 99.0 fL) 96.6

MCH (27.0 - 33.0 pg) 30.0

MCHC (33.0 - 37.0 g/dL) 31.1 L

RDW (11.5 - 14.5 %) 14.7 H

Plt Count (150 - 400 x10 3/uL) 155

MPV (7.0 - 9.0 fL)  10.2 H

Neut % (Auto) (56.0 - 77.0 %) 89.0 H

Lymph % (Auto) (14.0 - 32.0 %) 3.5 L

Mono % (Auto) (4.8 - 9.0 %) 6.7

Eos % (Auto) (0.3 - 3.7 %) 0.0 L

Baso % (Auto) (0.0 - 2.0 %) 0.0

Neut # (Auto) (2.0 - 7.6 x10 3/uL) 7.57

Lymph # (Auto) (1.0 - 3.8 x10 3/uL) 0.30 L

Mono # (Auto) (0.1 - 0.8 x10 3/uL) 0.57

Eos # (Auto) (0.0 - 0.2 x10 3/uL) 0.00

Baso # (Auto) (0.0 - 0.2 x10 3/uL) 0.00

Abs Immat Gran (auto) (0.00 - 0.03 x10 3/uL) 0.07 

H

Immature Gran % (0.0 - 2.0 %) 0.8

Nucleated RBC % (0 - 0 %) 0.0

Nucleated RBCs # (Man) (0.0 - 0.1 x10 3/uL) 0.00



Laboratory Tests



25 0509:

[Embedded Image Not Available]





25 0405:

[Embedded Image Not Available]

Microbiology:

 0935 SPUTUM: Sputum Culture - RES

GRAM NEGATIVE PRIMO

GRAM NEGATIVE PRIMO#2

 0935 SPUTUM: Gram Stain - RES

 0219 NASAL: MRSA DNA Surveillance Screen - 

COMP

 1740 NASAL: MSSA Surveillance Screen - COMP

 1740 NASAL: MRSA DNA Surveillance Screen - 

COMP

 1740 NASAL: Influenza Virus Type B Antigen 

- COMP

 1740 NASAL: Influenza Virus Type A Antigen 

- COMP





Radiology data

Recent Impressions:

CAT SCAN - CT CHEST W/O CONTRAST  1400

*** Report Impression - Status: SIGNED Entered: 

2025 1620



IMPRESSION:

Emphysema and scar in both lungs. There are areas 

of coalescent

bandlike scarring in the left upper lung 

surrounding a cavitary area.

Peripheral high density change at this area may 

reflect areas of

calcification are represents sequela of prior 

surgery. In the absence

of previous surgery in this area, further 

characterization with PET/CT

may be of benefit.



Please see detailed descriptions and other 

findings as above



Impression By: Victor Hugo - Karly Chen M.D.







Free Text Obj Notes

Free Text Obj Notes:

GEN: Appears tachypneic and winded with activity

HEENT: Atraumatic, normocephalic, dry mucous 

membranes

NECK: Supple, good range of motion, no 

tenderness, no JVD

LUNGS: Symmetrical air entry, tachypneic, 

retractions, accessory muscle use,

diminished throughout with fine wheezing

CV: S tachycardic, warm and well perfused

GI: Abdomen is soft, not tender or distended

EXT/Musc: No edema or cyanosis. Pedal pulses 

present. Compartments soft

Skin: SWarm to touch

NEURO: Awake, alert and oriented x3. No facial 

droop or focal deficit





Diagnosis, Assessment Plan

Free text A P:

Patient is a 69-year-old male with history of 

tobacco abuse and COPD in the

CVICU for evaluation of coronary artery bypass 

graft surgery for multivessel

coronary artery disease



Acute on chronic hypoxic respiratory failure

Acute exacerbation of COPD on home oxygen 2 to 4 

L

Tobacco abuse

Multivessel coronary artery disease



-DuoNebs, Zithromax, pulmonary consult. Sputum 

cultures growing gram-negative

rods. Patient still feels short of breath. Will 

change antibiotics to

cefepime. Will complete Zithromax today.

-Started on budesonide nebs and ipratropium nebs.

-SpO2 goal of 88%, wean supplemental oxygen as 

tolerated, incentive spirometry,

pulmonary hygiene

-Duplexes of lower extremities

-Influenza and COVID swab negative

-Preop labs and workup per CT surgery, patient 

will be discussed in high risk

surgery conference tomorrow

-Will use BiPAP as needed

-Beta-blocker increased to 25 twice daily. Will 

give 1 dose of Lasix 20 IV

Repeat BMP

SCDs for DVT prophylaxis

Full code



Critical care time 33 minutes spent with patient 

excluding bedside procedures

and teach



Electronically Signed by Delphine Stock MD on 

25 at 1210



RPT #:[id number]

***END OF REPORT***                                 [facility]

 

                                        2025 07:50:00 

[facility] ([facility])

Hospitalist Progress Note

REPORT#:[id number] REPORT STATUS: Signed

REPORT INITIALIZATION DATE:25 TIME: 075



PATIENT: EPIFANIO QUINONES UNIT #: [id number]

ACCOUNT#: [id number] ROOM/BED: [address]

: 54 AGE: 69 SEX: M ATTEND: 

Bong Corey MD

ADM DT: 25 AUTHOR: Jesu Hartman NP

REPT SERVICE DT/TIME: 25 0750

* ALL edits or amendments must be made on the 

electronic/computer document *





Subjective

Chief complaint:

He is c/o right hand pain.

Waiting for PCI.

Walked with PT.

No CP, fever, chills.

NO N/v/d.



Review of Systems

Constitutional:

Reports: fatigue, generalized weakness.

Allergy/Immun:

Denies: anaphylaxis, hives, rhinorrhea.

Respiratory:

Denies: CARRERO (dyspnea on exertion), non productive 

cough, productive cough (

sputum), wheezing.

Cardiovascular:

Reports: chest pain. Denies: edema, orthopnea, 

parox nocturnal dyspnea.

GI:

Denies: anorexia, dysphagia, hematochezia, rectal 

pain.

:

Denies: dysuria, hematuria, penile discharge, 

testicular pain.

Musculoskeletal:

Denies: extremity pain, extremity swelling, joint 

swelling, myalgias.

Neuro:

Denies: bowel dysfunction, dizziness, headache, 

lightheaded, slurred speech.



Objective



General

VS/I O:

Vital Signs:

Date Time Temp Pulse Resp B/P B/P Pulse O2 O2 

Flow FiO2

Mean Ox Delivery Rate

 0835  89 Room air

 0400 36.6

 0008 69 96 40

 2000 37.6

 1957 102  99 40

 1941 96 Ventilator 6 40

 1800 113 47 95

 1705  96 Nasal 4

cannula

 1700 118 39 96

 1619  88 Room air

 1600 36.9

 1600 Nasal 4

cannula

 1348 101 27 143/81 106

 1300 101 30 129/69 90 93

 1200 Nasal 2

cannula

 1200 94 26 143/72 98 93



24 hour I O ending at 0700:

 0700  1900

Intake Total 750.00 1560.00

Output Total 650 785

Balance 100.00 775.00



Intake, IV 30.00 260.00

Intake, Oral 720 1300

Intake, Oral  0

Supplement

Number 0

Bowel

Movements

Number  1

Incontinent

Voids

Number Voids 0

Output, Stool  0

Output, Urine 650 785

Patient 67 kg

Weight

Weight Standing scale

Measurement

Method



PATIENT WEIGHT:



Weight (lb): 147

Weight (oz): 11.36

Weight (kg): 67.000



Medications:

Active Meds + DC'd Last 24 Hrs

Heparin Sodium (HEPARIN 5000 UNITS/ML) 5,000 UNIT 

Q12HR SUBQ

Azithromycin (ZITHROMAX) 500 MG ONCE ONE PO

Mupirocin (BACTROBAN 2% 22 GM OINTMENT) 1 APPLIC 

BID NASAL

Metoprolol Tartrate (LOPRESSOR) 25 MG Q12HR PO

Furosemide (LASIX 20MG INJ) 20 MG ONCE ONE IV 

(DC)

Magnesium Sulfate (MAGNESIUM SULFATE 2GM/SWFI 

50ML) 50 ML ONCE ONE IV (

DC)

Atorvastatin Calcium (LIPITOR) 40 MG 2100 PO

Levalbuterol HCl (Levalbuterol 1.25 mg/3 mL NEB) 

1.25 MG RTQ6H INH

Methylprednisolone Sodium Succinate (Solu-Medrol 

40 MG Vial) 40 MG Q8H IV



Sterile Water (WATER FOR INJECTION) 1 ML ASDIR 

PRN IV

Ipratropium Bromide (ATROVENT) 500 MCG RTQ6H INH

Ceftriaxone Sodium (ROCEPHIN 1000MG VIAL) 1,000 

MG Q24H IV

Sodium Chloride (SODIUM CHLORIDE) 10 ML

Levalbuterol HCl (Levalbuterol 1.25 mg/3 mL NEB) 

1.25 MG RTQ4H PRN PRN INH



Metoprolol Tartrate (LOPRESSOR) 12.5 MG Q12HR PO 

(DC)

Sterile Water (WATER FOR INJECTION) 1 ML ASDIR 

PRN IV

Budesonide (PULMICORT RESPULES) 0.5 MG RTBID INH

Aspirin (ASPIRIN) 81 MG DAILY PO

Azithromycin (ZITHROMAX) 500 MG Q24H IV (DC)

Sodium Chloride (SODIUM CHLORIDE 0.9%) 250 ML





Dietitian nutrition assessment

The data set between the solid lines has been 

imported from the dietitian's

assessment.

_________________________________________________

________________



BMI Calculated: 22.5

Nutrition related diagnosis:

Nutrition diagnosis details:

Nutrition problem:

Nutrition etiology:

Nutrition signs and symptoms:

Nutrition prescription:

Dietitian name:

Assessment completed:

_________________________________________________

________________







Physical Exam

General appearance: alert, awake, oriented

Head/Eyes: atraumatic, normocephalic, PERRLA

Neck: full range of motion, supple/no 

meningismus, no bruit/NL carotids, no JVD

Cardiovascular: normal capillary refill, normal 

heart sounds, regular rate

rhythm

Respiratory: aerating well

Abdomen: non-tender, normal bowel sounds, soft

Genitourinary: no bladder distention, no flank 

pain, no urinary catheter

Extremities: no calf tenderness, no clubbing, no 

cyanosis

Musculoskeletal: no CVA tenderness, no muscle 

spasm

Neuro/CNS: alert, oriented X 3, CNII-XII intact



Results

Findings/Data:

Laboratory Tests



0509

Chemistry

Sodium (134 - 147 mEq/L) 141

Potassium (3.4 - 5.0 mEq/L)  4.7

Chloride (100 - 108 mEq/L) 107

Carbon Dioxide (21 - 33 mEq/l) 31

Anion Gap (0 - 20)  8

BUN (7 - 25 mg/dL) 23

Creatinine (0.6 - 1.3 mg/dL) 0.8

Glomerular Filtr Rate (70 - 80) 95.2 H

Glucose (77 - 141 mg/dL) 128

Calcium (8.0 - 10.5 mg/dL) 6.9 L

Magnesium (1.6 - 2.6 mg/dL) 1.87



Laboratory Tests



0405

Hematology

WBC (4.5 - 11.0 x10 3/uL) 8.5

RBC (4.00 - 5.60 x10 6/uL) 3.53 L

Hgb (12.5 - 16.9 g/dL)  10.6 L

Hct (37.5 - 50.7 %) 34.1 L

MCV (81.0 - 99.0 fL) 96.6

MCH (27.0 - 33.0 pg)  30.0

MCHC (33.0 - 37.0 g/dL) 31.1 L

RDW (11.5 - 14.5 %) 14.7 H

Plt Count (150 - 400 x10 3/uL) 155

MPV (7.0 - 9.0 fL) 10.2 H

Neut % (Auto) (56.0 - 77.0 %) 89.0 H

Lymph % (Auto) (14.0 - 32.0 %) 3.5 L

Mono % (Auto) (4.8 - 9.0 %) 6.7

Eos % (Auto) (0.3 - 3.7 %) 0.0 L

Baso % (Auto) (0.0 - 2.0 %) 0.0

Neut # (Auto) (2.0 - 7.6 x10 3/uL) 7.57

Lymph # (Auto) (1.0 - 3.8 x10 3/uL) 0.30 L

Mono # (Auto) (0.1 - 0.8 x10 3/uL) 0.57

Eos # (Auto) (0.0 - 0.2 x10 3/uL) 0.00

Baso # (Auto) (0.0 - 0.2 x10 3/uL) 0.00

Abs Immat Gran (auto) (0.00 - 0.03 x10 3/uL) 0.07 

H

Immature Gran % (0.0 - 2.0 %) 0.8

Nucleated RBC % (0 - 0 %) 0.0

Nucleated RBCs # (Man) (0.0 - 0.1 x10 3/uL) 0.00





Radiology data:

Recent Impressions:

CAT SCAN - CT CHEST W/O CONTRAST  1400

*** Report Impression - Status: SIGNED Entered: 

2025 1780



IMPRESSION:

Emphysema and scar in both lungs. There are areas 

of coalescent

bandlike scarring in the left upper lung 

surrounding a cavitary area.

Peripheral high density change at this area may 

reflect areas of

calcification are represents sequela of prior 

surgery. In the absence

of previous surgery in this area, further 

characterization with PET/CT

may be of benefit.



Please see detailed descriptions and other 

findings as above



Impression By: Victor Hugo Chen M.D.





Results: labs reviewed, vital signs reviewed, 

vital signs stable, current med

profile rev'd



Treatment Prophylaxis



Treatment Prophylaxis

Oxygen: room air



Diagnosis, Assessment Plan

Hospital course to date:

Assessment and plan:



Multivessel CAD.

Acute COPD excerbation.

SOB due to CAD.

HX of COPD and PNA.



Plan:



CVICU.

Will go for PCI tomorrow.

Pulmonary for COPD.

Monitor respirartory status, breathing tx, o2 

support.

IS.

Pain meds.

Antiemetics.

Cough meds.

Follow labs and replace as needed.

Continue home meds.

Monitor.







Electronically Signed by Jesu Hartman NP on 

25 at 1130

Electronically Signed by Amelia Justice MD on 

25 at 1427



RPT #:[id number]

***END OF REPORT***                                 [facility]

 

                                        2025 07:42:00 

[facility])

Cardiothoracic Surgery Prog

REPORT#:[id number] REPORT STATUS: Signed

REPORT INITIALIZATION DATE:25 TIME: 742



PATIENT: EPIFANIO QUINONES UNIT #: [id number]

ACCOUNT#: [id number] ROOM/BED: [address]

: 54 AGE: 69 SEX: M ATTEND: 

Bong Corey MD

ADM DT: 25 AUTHOR: Tanisha Flores Physic

REPT SERVICE DT/TIME: 2542

* ALL edits or amendments must be made on the 

electronic/computer document *





Subjective

Chief complaint:

SOB,

Multivessel CAD



Review of Systems

Constitutional:

Reports: fatigue.

Allergy/Immun:

Denies: anaphylaxis, hives, itching.

Eyes:

Denies: diplopia, eye pain.

Respiratory:

Reports: CARRERO (dyspnea on exertion), SOB.

Cardiovascular:

Reports: CARRERO (dyspnea on exertion). Denies: chest 

pain.

GI:

Denies: constipation, diarrhea.

:

Denies: dysuria, hematuria.

Musculoskeletal:

Denies: joint pain, joint swelling.

Heme:

Denies: bleeding, bruising.

All systems rev neg: except as marked



Objective



General

VS/I O

Last Documented:

Result Date Time

Temp  97.9  0400

Pulse Ox 96  0008

FiO2 40  0008

Pulse 69  0008

O2 Delivery Ventilator 1941

O2 Flow Rate 6 1941

Resp 47  1800

B/P 143/81  1348

B/P Mean 106  1348



24 hour I O ending at 0700:

 0700  1900

Intake Total 750.00 1560.00

Output Total 650 785

Balance  100.00 775.00



Intake, IV 30.00 260.00

Intake, Oral 720 1300

Intake, Oral  0

Supplement

Number 0

Bowel

Movements

Number 1

Incontinent

Voids

Number Voids 0

Output, Stool 0

Output, Urine 650 785

Patient 67 kg

Weight

Weight Standing scale

Measurement

Method



PATIENT WEIGHT:



Weight (lb): 147

Weight (oz): 11.36

Weight (kg): 67.000





Physical Exam

General appearance: alert, awake, oriented

HEENT: anicteric, mucosal membranes moist

Neck: full range of motion, non-tender

Cardiovascular: tachycardia

Respiratory: crackles, decreased breath sounds

Abdomen: soft, non-tender

Extremities: dry, moves all

Neuro/CNS: alert, oriented X 3

Skin: dry





Diagnosis, Assessment Plan

Free Text A P:

This is a 69-year-old gentleman with a past 

medical history of COPD, on home

oxygen, 4 L who presented to the hospital at [place] for complaints of

worsening shortness of breath productive cough,. 

Patient was treated for COPD

exacerbation and pneumonia with antibiotics. 

Patient then underwent heart

catheterization at [facility] 

multivessel coronary artery disease.



The patient does admit to productive cough with 

yellow sputum. Patient denies

any history of PE and DVT. Denies any nausea 

vomiting diarrhea.



Patient was transferred to [facility] for 

further workup and evaluation for

coronary bypass graft surgery



Patient reports he continues to smoke 1 pack/day.

Patient denies any alcohol use.



Patient transferred to [facility] for COPD 

exasperation and workup for

multivessel coronary disease



Assessment/plan

1. Coronary disease

2. COPD exacerbation

With recent history of pneumonia

3. Current everyday smoker





Patient will be admitted. Pulmonary consulted for 

COPD. Will order CT of the

chest. Echocardiogram ordered. Consult 

cardiology. Workup underway for

Multivessel CAD.

Further recommendations to follow.



25

Patient resting

Required BiPAP overnight.

Denies chest pains

Workup underway.

Cardiology consulted

Echocardiogram completed. Pending result

CT of the chest

Carotid Doppler shows 50 to 70% stenosis of the 

left carotid.  Right carotid

shows less than 50% stenosis

Vein mapping completed

Pulmonary consulted, appreciate input.

Started on azithromycin

Patient seen and examined at [facility]. Workup 

underway. Plan of care discussed

with multidisciplinary team

Pulmonary for COPD exacerbation.

Cardiology consult.

Further recs to follow.





25

Patient resting

Denies chest pains

4L NC, BiPAP while sleeping, encourage I-S and 

deep breathing, started on IV

Solu-Medrol 40 mg Q8 bronchodilators scheduled 

Atrovent, levalbuterol

On IV antibiotics Rocephin and azithromycin

Sputum culture pending

Sinus rhythm

Echocardiogram completed. EF 40-44%, grade 1 

diastolic dysfunction, no AV

regurgitation, trace MR, trace TR.

Carotid Doppler shows 50 to 70% stenosis of the 

left carotid. Right carotid

shows less than 50% stenosis

Vein mapping completed

Patient seen and examined at [facility]. Workup 

underway. Plan of care discussed

with multidisciplinary team

Patient will need to be discussed in the high 

risk clinical conference on

Friday. Further recommendations to follow. 

Patient seen and examined at

[facility]. Plan of care discussed with 

multidisciplinary team



Electronically Signed by Tanisha Flores 

on 25 at 0746

Electronically Signed by Bong Corey MD on 

25 at 0435



RPT #:[id number]

***END OF REPORT***                                 [facility]

 

                                        2025 07:32:00 

[facility]

Cardiology Progress Note

REPORT#:[id number] REPORT STATUS: Signed

REPORT INITIALIZATION DATE:25 TIME: 732



PATIENT: EPIFANIO QUINONES UNIT #: [id number]

ACCOUNT#: [id number] ROOM[address]BED: [address]

: 54 AGE: 69 SEX: M ATTEND: 

Bong Corey MD

ADM DT: 25 AUTHOR: Lena Fritz 

AGACNP

REPT SERVICE DT/TIME: 25 0732

* ALL edits or amendments must be made on the 

electronic/computer document *





Subjective

Comments:

SOB improved.



Objective



General

VS/I O:

24 hour I O ending at 0700:

 0700  1900

Intake Total  750.00 1560.00

Output Total 650 785

Balance 100.00 775.00



Intake, IV  30.00 260.00

Intake, Oral 720 1300

Intake, Oral 0

Supplement

Number 0

Bowel

Movements

Number 1

Incontinent

Voids

Number Voids 0

Output, Stool 0

Output, Urine 650 785

Patient 67 kg

Weight

Weight Standing scale

Measurement

Method

Vital Signs

Date Temp Pulse Resp B/P B/P Mean Pulse Ox FiO2

- 36.6-37.6  26-59 129-154/69-82 

  40





PATIENT WEIGHT:



Weight (lb): 147

Weight (oz): 11.36

Weight (kg): 67.000



Medications:

Active Meds + DC'd Last 24 Hrs

Atorvastatin Calcium (LIPITOR) 40 MG 2100 PO

Levalbuterol HCl (Levalbuterol 1.25 mg/3 mL NEB) 

1.25 MG RTQ6H INH

Methylprednisolone Sodium Succinate (Solu-Medrol 

40 MG Vial) 40 MG Q8H IV



Sterile Water (WATER FOR INJECTION) 1 ML ASDIR 

PRN IV

Ipratropium Bromide (ATROVENT) 500 MCG RTQ6H INH

Ceftriaxone Sodium (ROCEPHIN 1000MG VIAL) 1,000 

MG Q24H IV

Sodium Chloride (SODIUM CHLORIDE) 10 ML

Levalbuterol HCl (Levalbuterol 1.25 mg/3 mL NEB) 

1.25 MG RTQ4H PRN PRN INH



Metoprolol Tartrate (LOPRESSOR) 12.5 MG Q12HR PO

Sodium Zirconium Cyclosilicate (Lokelma) 10 GM 

ONCE ONE PO (DC)

Sterile Water (WATER FOR INJECTION) 1 ML ASDIR 

PRN IV

Budesonide (PULMICORT RESPULES) 0.5 MG RTBID INH

Methylprednisolone Sodium Succinate (Solu-Medrol 

40 MG Vial) 40 MG ONCE ONE

IV (DC)

Albuterol/Ipratropium (DUONEB) 3 ML RTQ4H PRN PRN 

NEB (DC)

Aspirin (ASPIRIN) 81 MG DAILY PO

Azithromycin (ZITHROMAX) 500 MG Q24H IV

Sodium Chloride (SODIUM CHLORIDE 0.9%) 250 ML







Physical Exam

General appearance: chronically ill appearing, 

frail, alert, awake, oriented

Neck: non-tender, no JVD

Cardiovascular:

CV assessment: regular rate and rhythm

Respiratory: on oxygen, shortness of breath, 

wheezing

Abdomen: soft, non-tender, normal bowel sounds, 

no distention

Genitourinary: no urinary catheter

Lower extremity:

LE assessment: no calf tenderness, no edema

Musculoskeletal: normal inspection

Neuro/CNS: alert, oriented X 3, normal speech

Psychiatry: normal affect, normal mood



Results

Findings/Data:

Laboratory Tests



0509 0935 0935

Chemistry

Sodium (134 - 147 mEq/L) 141 135

Potassium (3.4 - 5.0 mEq/L) 4.7 5.0

Chloride (100 - 108 mEq/L) 107 102

Carbon Dioxide (21 - 33 mEq/l) 31 34 H

Anion Gap (0 - 20) 8  4

BUN (7 - 25 mg/dL) 23 30 H

Creatinine (0.6 - 1.3 mg/dL) 0.8 1.1

Glomerular Filtr Rate (70 - 80) 95.2 H 72.2

Glucose (77 - 141 mg/dL) 128 131

Calcium (8.0 - 10.5 mg/dL) 6.9 L 8.3

Magnesium (1.6 - 2.6 mg/dL) 1.87

B-Natriuretic Peptide (0 - 100 PG/ML) 174.0 H



Laboratory Tests



0405

Hematology

WBC (4.5 - 11.0 x10 3/uL) 8.5

RBC (4.00 - 5.60 x10 6/uL) 3.53 L

Hgb (12.5 - 16.9 g/dL) 10.6 L

Hct (37.5 - 50.7 %) 34.1 L

MCV (81.0 - 99.0 fL) 96.6

MCH (27.0 - 33.0 pg) 30.0

MCHC (33.0 - 37.0 g/dL)  31.1 L

RDW (11.5 - 14.5 %) 14.7 H

Plt Count (150 - 400 x10 3/uL) 155

MPV (7.0 - 9.0 fL) 10.2 H

Neut % (Auto) (56.0 - 77.0 %) 89.0 H

Lymph % (Auto) (14.0 - 32.0 %) 3.5 L

Mono % (Auto) (4.8 - 9.0 %) 6.7

Eos % (Auto) (0.3 - 3.7 %) 0.0 L

Baso % (Auto) (0.0 - 2.0 %) 0.0

Neut # (Auto) (2.0 - 7.6 x10 3/uL) 7.57

Lymph # (Auto) (1.0 - 3.8 x10 3/uL) 0.30 L

Mono # (Auto) (0.1 - 0.8 x10 3/uL)  0.57

Eos # (Auto) (0.0 - 0.2 x10 3/uL) 0.00

Baso # (Auto) (0.0 - 0.2 x10 3/uL) 0.00

Abs Immat Gran (auto) (0.00 - 0.03 x10 3/uL) 0.07 

H

Immature Gran % (0.0 - 2.0 %) 0.8

Nucleated RBC % (0 - 0 %) 0.0

Nucleated RBCs # (Man) (0.0 - 0.1 x10 3/uL) 0.00



Laboratory Tests



0509 0935

Chemistry

Magnesium (1.6 - 2.6 mg/dL) 1.87

B-Natriuretic Peptide (0 - 100 PG/ML) 174.0 H





Radiology data:

Recent Impressions:

CAT SCAN - CT CHEST W/O CONTRAST  1400

*** Report Impression - Status: SIGNED Entered: 

2025 1620



IMPRESSION:

Emphysema and scar in both lungs. There are areas 

of coalescent

bandlike scarring in the left upper lung 

surrounding a cavitary area.

Peripheral high density change at this area may 

reflect areas of

calcification are represents sequela of prior 

surgery. In the absence

of previous surgery in this area, further 

characterization with PET/CT

may be of benefit.



Please see detailed descriptions and other 

findings as above



Impression By: Victor Hugo Chen M.D.





Results: labs reviewed, vital signs reviewed, 

rhythm personally rev'd

Telemetry Interpretation:

NSR





Diagnosis, Assessment Plan

Plan discussed with: patient, collaborating MD, 

consultants (MARTHA RUIZ), nurse



Free Text DxA P Notes

Free Text DxA P Notes:

68 YO male with MH of COPD, lifelong heavy smoker 

who initially presented at [facility] with shortness of breath attributed to 

COPD exacerbation and

pneumonia. He was eventually discharged but 

presented back to the hospital with

persistent shortness of breath which prompted 

coronary angiogram which revealed

multivessel CAD. The patient is transferred to 

our facility for surgical

revascularization evaluation.



1. Multivessel CAD

* CABG evaluation ongoing

* continue ASA and statin

* echocardiogram LVEF 40-44%, G1DD, hypokinesis 

of the mid anteroseptal and

apical septal wall and the apex

* case will be presented to CV conference, he is 

too frail with advanced COPD.

CT showed emphysema and scar both lungs. If he is 

declined for CABG then will do

PCI.



2. COPD exacerbation/Hypoxia

lifelong tobacco abuse

recently treated for pneumonia

* pulmonary following



3. Hyperkalemia

* K 5.7->5.4->5.5->4.7

* given Lokelma 10 mg yesterday



4. Acute systolic and diastolic HF/Ischemic 

cardiomyopathy

* CABG workup ongoing

* echo LVEF 40-44%, G1DD, hypokinesis of the mid 

anteroseptal and apical septal

wall and the apex



MDM by Dr. Buck.





Electronically Signed by Lena Firtz on 25 at 0907

Electronically Signed by Marcos Buck MD on 

25 at 1826



RPT #:[id number]

***END OF REPORT***                                 [facility]

 

                                        2025 18:26:00 [id number] [place] [address]





PATIENT NAME: EPIFANIO QUINONES ADMIT DATE: 

25

ACCOUNT NO: [id number] ROOM NO: [facility]

MEDICAL RECORD NO: [id number] AGE: 69

REPORT TYPE: eECHOCARDIOGRAM REPORT  SEX: M



ADMITTING PHYSICIAN:Bong Corey MD

ATTENDING PHYSICIAN:Bong Corey MD





*[place] [address]

Phone: [phone]

Fax: [phone]

Transthoracic Echocardiogram



Patient: Epifanio Quinones

Study Date: 2025

BP: 111 / 59

URN: [id number]

MRN: [id number]

Account#: [id number]

Location:

Accession#: [id number]

: 1955

Age: 69

Gender: M

Height: 68 in / 172.7 cm

Weight: 147 lb / 66.7 kg

BMI/BSA: 22.4 kg/m 2 / 1.79 m 2

*Ordering Physician: * Tanisha Flores Physic



*Interpreting Physician: * Marcos Buck MD

*Sonographer: * Ev Payne



-------------------------------------------------

-----------------------------

Indications: CARDIAC SURGERY PRE-OP. IF NOT DONE 

THIS ADMISSION.



-------------------------------------------------

-----------------------------

Study data: Transthoracic echocardiogram. 

Procedure: A transthoracic

echocardiogram was performed. Images were 

obtained using a UBIKOD cardiac

ultrasound machine. The study was technically 

limited due to poor acoustic

window availability, poor patient compliance, and 

body habitus. Complete 2D,

complete spectral Doppler, and color Doppler. 

Location: Bedside. Patient

status: Inpatient. Patient room number: 2201. 

Study status: Routine. Heart

rate: 113 bpm.



-------------------------------------------------

-----------------------------

Findings



Left ventricle: The cavity size is normal. Wall 

thickness is normal. Systolic

function is mildly reduced. The estimated 

ejection fraction is 40-44%.

PATIENT NAME: EPIFANIO QUINONES ACCOUNT #: 

[id number]







Regional wall motion: There is hypokinesis of the 

mid anteroseptal and

apical septal walls and the apex. Grade I 

diastolic dysfunction.

Right ventricle: Not well visualized. Systolic 

function is normal.

Left atrium: Not well visualized. The atrium is 

normal in size.

Right atrium: Not well visualized. The atrium is 

normal in size.

Aorta:

Aortic root: The root is normal-sized.

Aortic valve: Not well visualized. The valve is 

structurally normal. The

valve is trileaflet. There is no evidence of 

stenosis. There is no

regurgitation.

Mitral valve: The valve is structurally normal. 

There is no evidence of

stenosis. There is trace regurgitation.

Tricuspid valve: Not well visualized. The valve 

is structurally normal. There

is trace regurgitation.

Pulmonic valve: The valve is structurally normal. 

There is no evidence of

stenosis. There is no regurgitation.

Pericardium: A prominent pericardial fat pad is 

present. There is no

pericardial effusion.

Pulmonary arteries: The main pulmonary artery is 

normal-sized.

Systemic veins:

Inferior vena cava: The IVC is normal-sized.



-------------------------------------------------

-----------------------------

Measurements



Left ventricle Value Ref

HECTOR, LAX 4.1 cm 4.2 - 5.8

ESD, LAX 2.5 cm 2.5 - 4.0

FS, LAX 38 % 25 - 43

HECTOR major ax, A2C 6.9 cm ---------

ESD major ax, A2C 6.4 cm ---------

IVS, ED 1.0 cm 0.6 - 1.0

PW, ED 1.1 cm 0.6 - 1.0

IVS/PW, ED 0.87 ---------

EF 69 % 52 - 72

E', lat gaetano, TDI 7.1 cm/sec >=10.0

E/e', lat gaetano, TDI 12 <=13

E', med gaetano, TDI 6.3 cm/sec >=7.0

E/e', med gaetano, TDI 13 ---------

E', avg, TDI 6.7 cm/sec ---------

E/e', avg, TDI 12 <=14



LVOT Value Ref

Diam, S 2.07 cm ---------

Area 3.4 cm 2 ---------

Peak andreea, S 1.11 m/sec ---------

Mean andreea, S 0.85 m/sec ---------

VTI, S 18.9 cm ---------

Peak grad, S 5 mm Hg ---------

Mean grad, S 3 mm Hg ---------

SV 64 ml ---------

Qs 6.66 L/min ---------

Qs/bsa 3.7 L/(min-m 2) ---------

SV/bsa 36 ml/m 2 ---------

PATIENT NAME: EPIFANIO QUINONES ACCOUNT #: 

[id number]









Right ventricle Value Ref

TAPSE, MM 2.1 cm >=1.7



Left atrium Value Ref

AP dim, ES 2.7 cm 3.0 - 4.0

Vol/bsa, S 15 ml/m 2 16 - 34

Vol/bsa, ES, 1-p A4C 8 ml/m 2 12 - 37

Vol, ES, 2-p 27 ml ---------

Vol/bsa, ES, 2-p 15 ml/m 2 16 - 34

Vol/bsa, ES, A/L 9 ml/m 2 16 - 34



Aortic valve Value Ref

Peak v, S 1.2 m/sec ---------

Mean v, S 0.87 m/sec ---------

VTI, S 20.2 cm ---------

Mean grad, S 3 mm Hg ---------

Peak grad, S 5.4  mm Hg ---------

LVOT/AV, VTI ratio 0.93 ---------

ESTELA, VTI 3.15 cm 2 ---------

LVOT/AV, Vpeak ratio 0.96 ---------

ESTELA, Vmax 3.23 cm 2 ---------



Mitral valve Value Ref

Peak E 0.81 m/sec ---------

Peak A 1.11 m/sec ---------

Decel time 73 ms ---------

Peak grad, D 2.6 mm Hg ---------

Peak E/A ratio 0.73 ---------



Aortic root  Value Ref

Root diam 2.9 cm 2.5 - 4.0



-------------------------------------------------

-----------------------------

Conclusions



Summary:



1. Left ventricle: The cavity size is normal. 

Wall thickness is normal.

Systolic function is mildly reduced. The 

estimated ejection fraction is

40-44%. Grade I diastolic dysfunction.

2. Right ventricle: Not well visualized. The 

tricuspid annular plane systolic

excursion is 2.1 cm.

3. Aortic valve: Not well visualized.

4. Tricuspid valve: Not well visualized.

5. Pericardium, extracardiac: There is no 

pericardial effusion.

6. Inferior vena cava: The IVC is normal-sized.

7. Regional wall motion: There is hypokinesis of 

the mid anteroseptal and

apical septal walls and the apex.

Electronically signed by









PATIENT NAME: EPIFANIO QUINONES ACCOUNT #: 

[id number]







Marcos Buck MD

2025 18:26











Electronically Signed by Marcos Buck MD on 

25 at 1826





























































































PATIENT NAME: EPIFANIO QUINONES ACCOUNT #: 

[id number]                                        [facility]

 

                                        2025 14:36:00 

[facility] ([facility])

Critical Care Progress Note

REPORT#:[id number] REPORT STATUS: Signed

REPORT INITIALIZATION DATE:25 TIME: 



PATIENT: EPIFANIO QUINONES UNIT #: [id number]

ACCOUNT#: [id number] ROOM/BED: [place] [address]

: 54 AGE: 69 SEX: M ATTEND: 

Bong Corey MD

ADM DT: 25  AUTHOR: Delphine Stock MD

REPT SERVICE DT/TIME: 25 1436

* ALL edits or amendments must be made on the 

electronic/computer document *





Subjective

Chief complaint:

cough, sob, fatigue

HPI:

Patient is a 69-year-old male with an extensive 

history of chronic hypoxic

respiratory failure, COPD on home oxygen 2 to 4 

L. Patient also is an everyday

smoker of 1 pack/day. Patient was admitted at 

outside hospital  and

treated for COPD exacerbation and pneumonia with 

antibiotics, breathing

treatments, steroids. He left the hospital after 

4 days. He Nahed presented to

the hospital  with yellow sputum 

production, cough, shortness of

breath, fatigue. Again being treated for COPD 

exacerbation. He underwent

coronary angiography this morning and was found 

to have multivessel coronary

artery disease. He was transferred here for 

evaluation by CT surgery for

possible CABG evaluation. At the time he is 

hemodynamically stable although

hypoxic on 5 L nasal cannula. He is not having 

any chest pain. He does have a

productive cough with yellow sputum. He is 

slightly tachycardic in the low 100s

after receiving breathing treatments and with any 

exertion or transferring in

bed. He denies fever, lower extremity swelling, 

history of PE or DVT,

hemoptysis, recent travel, nausea, vomiting, 

diarrhea, abdominal pain, rash,

sick contacts. He had a negative influenza and 

COVID swab outside hospital. He

has a workup pending for CABG evaluation and is 

currently being treated for COPD

exacerbation. Will discuss further with 

pulmonology and CT surgery about

steroid plan.

Comments:

Interval history



Patient is short of breath. His active wheezing. 

Having sputum production



Objective



General

VS/I O

Last Documented:

Result Date Time

B/P 143/81  1348

B/P Mean 106  1348

Pulse 101  1348

Resp 27  1348

Pulse Ox 93  1300

O2 Delivery Nasal cannula  1200

O2 Flow Rate 2  1200

FiO2 40  0809

Temp 36.8  0800



24 hour I O ending at 0700:

 0700  1900

Intake Total 600.00

Output Total 775

Balance -175.00



Intake, .00

Intake, Oral 300

Output, Urine 775

Patient 64.9 kg 67 kg

Weight

Weight Bed scale Not applicable

Measurement

Method



PATIENT WEIGHT:



Weight (lb): 143

Weight (oz): 1.28

Weight (kg): 64.900



Medications:

Active Meds + DC'd Last 24 Hrs

Levalbuterol HCl (Levalbuterol 1.25 mg/3 mL NEB) 

1.25 MG RTQ6H INH

Methylprednisolone Sodium Succinate (Solu-Medrol 

40 MG Vial) 40 MG Q8H IV



Sterile Water (WATER FOR INJECTION) 1 ML ASDIR 

PRN IV

Ipratropium Bromide (ATROVENT) 500 MCG RTQ6H INH

Ceftriaxone Sodium (ROCEPHIN 1000MG VIAL) 1,000 

MG Q24H IV

Sodium Chloride (SODIUM CHLORIDE) 10 ML

Levalbuterol HCl (Levalbuterol 1.25 mg/3 mL NEB) 

1.25 MG RTQ4H PRN PRN INH



Metoprolol Tartrate (LOPRESSOR) 12.5 MG Q12HR PO

Sodium Zirconium Cyclosilicate (Lokelma) 10 GM 

ONCE ONE PO (DC)

Sterile Water (WATER FOR INJECTION) 1 ML ASDIR 

PRN IV

Budesonide (PULMICORT RESPULES) 0.5 MG RTBID INH

Methylprednisolone Sodium Succinate (Solu-Medrol 

40 MG Vial) 40 MG ONCE ONE

IV (DC)

Albuterol/Ipratropium (DUONEB) 3 ML RTQ4H PRN PRN 

NEB (DC)

Aspirin (ASPIRIN) 81 MG DAILY PO

Azithromycin (ZITHROMAX) 500 MG Q24H IV

Sodium Chloride (SODIUM CHLORIDE 0.9%) 250 ML

Magnesium Sulfate (MAGNESIUM SULFATE 2GM/SWFI 

50ML) 50 ML ONCE ONE IV (

DC)







Results

Findings/data:

Laboratory Tests



0935 0935 0219 2012

Chemistry

Sodium (134 - 147 mEq/L) 135 137 135

Potassium (3.4 - 5.0 mEq/L)  5.0 5.5 H 5.4 H

Chloride (100 - 108 mEq/L) 102 103 103

Carbon Dioxide (21 - 33 mEq/l) 34 H 33 27

Anion Gap (0 - 20) 4 7 10

BUN (7 - 25 mg/dL) 30 H 28 H 24

Creatinine (0.6 - 1.3 mg/dL) 1.1 1.2 1.3

Glomerular Filtr Rate (70 - 80) 72.2 65.1 L 59.1 

L

Glucose (77 - 141 mg/dL)  131 152 H 138

Lactic Acid (0.4 - 1.9 mmol/L) 1.6

Calcium (8.0 - 10.5 mg/dL) 8.3 8.8 9.0

Ionized Calcium Barrett (1.09 - 1.30 1.12

MMOL/L)

Phosphorus (2.5 - 4.9 MG/DL) 3.9

Magnesium (1.6 - 2.6 mg/dL)  2.64 H

Total Bilirubin (0.0 - 1.0 mg/dL) 0.50

AST (8 - 34 IUnit/L) 60 H

ALT (10 - 49 IUnit/L)  48

Total Alk Phosphatase (20 - 125 93

IUnit/L)

B-Natriuretic Peptide (0 - 100 PG/ML) 174.0 H

Total Protein (5.7 - 8.2 g/dL)  6.8

Albumin (3.4 - 5.0 g/dL) 3.40





1806 1805 1805 1805

Chemistry

Sodium (134 - 147 mEq/L) 137

Potassium (3.4 - 5.0 mEq/L) 5.7 H

Chloride (100 - 108 mEq/L) 101

Carbon Dioxide (21 - 33 mEq/l)  31

Anion Gap (0 - 20) 11

BUN (7 - 25 mg/dL) 25

Creatinine (0.6 - 1.3 mg/dL) 1.3

Glomerular Filtr Rate (70 - 80) 59.1 L

Glucose (77 - 141 mg/dL)  146 H

Hemoglobin A1c (4.8 - 6.0 %A1C) 5.5

Calcium (8.0 - 10.5 mg/dL) 9.5

Magnesium (1.6 - 2.6 mg/dL) 2.36

Total Bilirubin (0.0 - 1.0 mg/dL) 0.50

AST (8 - 34 IUnit/L)  39 H

ALT (10 - 49 IUnit/L) 51 H

Total Alk Phosphatase (20 - 125 IUnit/L) 116

Troponin I High Sens (0 - 54 ng/L) 469 *H

C-Reactive Protein (<5.0 MG/L) 13.2 H

B-Natriuretic Peptide (0 - 100 PG/ML) 85.0

Total Protein (5.7 - 8.2 g/dL) 8.1

Albumin (3.4 - 5.0 g/dL) 4.10

Triglycerides (40 - 150 mg/dL) 148

Cholesterol (<200 mg/dL) 270 H

LDL Cholesterol Measurd (0 - 100 mg/dL)  143.0 H

HDL Cholesterol (40 - 60 MG/DL) 109.9 H

Cholesterol/HDL Ratio (3.43 - 4.97 RATIO) 2.46 L



Laboratory Tests

1805

Coagulation

INR (0.8 - 1.2) 1.0

PTT (Clifton) (25.0 - 39.5 Seconds) 21.9 L

PT Patient/Control Mix (9.3 - 12.9 SECONDS) 11.7



Laboratory Tests

 1806

Hematology

WBC (4.5 - 11.0 x10 3/uL) 11.2 H 10.7

RBC (4.00 - 5.60 x10 6/uL) 4.64 5.46

Hgb (12.5 - 16.9 g/dL)  14.5 16.5

Hct (37.5 - 50.7 %) 44.1 51.8 H

MCV (81.0 - 99.0 fL) 95.0  94.9

MCH (27.0 - 33.0 pg) 31.3 30.2

MCHC (33.0 - 37.0 g/dL) 32.9 L 31.9 L

RDW (11.5 - 14.5 %)  14.9 H 15.3 H

Plt Count (150 - 400 x10 3/uL) 243 199

MPV (7.0 - 9.0 fL) 9.7 H  11.1 H

Neut % (Auto) (56.0 - 77.0 %) 91.1 H 92.8 H

Lymph % (Auto) (14.0 - 32.0 %) 3.8 L 2.9 L

Mono % (Auto) (4.8 - 9.0 %) 4.5 L 3.0 L

Eos % (Auto) (0.3 - 3.7 %) 0.0 L 0.2 L

Baso % (Auto) (0.0 - 2.0 %)  0.1 0.2

Neut # (Auto) (2.0 - 7.6 x10 3/uL) 10.17 H 9.94 H

Lymph # (Auto) (1.0 - 3.8 x10 3/uL) 0.42 L 0.31 L

Mono # (Auto) (0.1 - 0.8 x10 3/uL) 0.50 0.32

Eos # (Auto) (0.0 - 0.2 x10 3/uL) 0.00 0.02

Baso # (Auto) (0.0 - 0.2 x10 3/uL)  0.01 0.02

Abs Immat Gran (auto) (0.00 - 0.03 x10 3/uL) 0.06 

H 0.10 H

Immature Gran % (0.0 - 2.0 %) 0.5 0.9

Nucleated RBC % (0 - 0 %) 0.0 0.0

Nucleated RBCs # (Man) (0.0 - 0.1 x10 3/uL) 0.00 

0.00

Immature Plt Fraction (0.9 - 11.2 %) 11.4 H

Plt Morphology Comment AGGREGATES

ESR Westergren (0 - 15 mm/hr) 18 H



Laboratory Tests

1740

Serology

SARS-CoV-2 Ag (Rapid) (Negative) Negative



Laboratory Tests



190

Urines

Urine Color (YEL/STRAW) YELLOW

Urine Appearance (CLEAR) CLEAR

Urine pH (5.0 - 7.0) 5.0

Ur Specific Gravity (1.005 - 1.030) 1.058 H

Urine Protein (NEGATIVE) NEGATIVE

Urine Glucose (UA) (NEGATIVE) NEGATIVE

Urine Ketones (NEGATIVE) NEGATIVE

Urine Blood (NEGATIVE) NEGATIVE

Urine Nitrite (NEGATIVE) NEGATIVE

Urine Bilirubin (NEGATIVE) NEGATIVE

Urine Urobilinogen (0.2 - 1.0 mg/dL) 0.2

Ur Leukocyte Esterase (NEGATIVE) NEGATIVE

Urine RBC (0 - 3 RBC/HPF) 0-3

Urine WBC (0 - 3 WBC/HPF) 0-3

Ur Squamous Epith Cells (NONE SEEN /HPF) NONE 

SEEN

Urine Bacteria (NONE SEEN /HPF) TRACE

Hyaline Casts (NONE SEEN /LPF) 0-2

Urine Mucus (NONE SEEN /LPF) TRACE



Laboratory Tests



25 0935:

[Embedded Image Not Available]





25 0219:

[Embedded Image Not Available]





25 2012:

[Embedded Image Not Available]





25 1806:

[Embedded Image Not Available]





25 1805:

[Embedded Image Not Available]

Microbiology:

935 SPUTUM: Sputum Culture - RES

935 SPUTUM: Gram Stain - RES

219 NASAL: MRSA DNA Surveillance Screen - 

RECD

1740 NASAL: MSSA Surveillance Screen - COMP

1740 NASAL: MRSA DNA Surveillance Screen - 

COMP

1740 NASAL: Influenza Virus Type B Antigen 

- COMP

1740 NASAL: Influenza Virus Type A Antigen 

- COMP





Radiology data

Recent Impressions:

RADIOLOGY - XR CHEST 1 V  1702

*** Report Impression - Status: SIGNED Entered: 

2025 1810



Impression:



1. No radiographic evidence of acute 

cardiopulmonary disease.





Impression By: [email or web address] Juan M Munoz M.D.

ULTRASOUND - DUP EXTRACRANIAL PRAKASH  180

*** Report Impression - Status: SIGNED Entered: 

2025 1833



IMPRESSION:



In the left carotid bifurcation there is high 

velocity through the

internal carotid artery as outlined above with 

stenosis estimate of

approximately 50-69%. On the right carotid 

bifurcation the flow

velocity estimate is less than 50%.



Please see above for follow-up imaging 

suggestions.



Both vertebral arteries exhibit antegrade blood 

flow.

Impression By: [email or web address] - Cecil Pisano M.D.

ULTRASOUND - US EXTREM NON VASC LTD  180

*** Report Impression - Status: SIGNED Entered: 

2025 1823



Impression: Venous mapping as described above.

Impression By: Albert Munoz M.D.

RADIOLOGY - XR CHEST 1 V  0606

*** Report Impression - Status: SIGNED Entered: 

2025 0906



IMPRESSION:



Negative for acute chest process.



Mild biapical pleural parenchymal scarring, left 

greater than right.

Impression By: [email or web address] - Marco Reilly D.O.







Free Text Obj Notes

Free Text Obj Notes:

GEN: Appears tachypneic and winded with 

conversation every 3 or 4 words

HEENT: Atraumatic, normocephalic, dry mucous 

membranes

NECK: Supple, good range of motion, no 

tenderness, no JVD

LUNGS: Symmetrical air entry, tachypneic, 

retractions, accessory muscle use,

diminished throughout with fine wheezing

CV: S tachycardic, warm and well perfused

GI: Abdomen is soft, not tender or distended

EXT/Musc: No edema or cyanosis. Pedal pulses 

present. Compartments soft

Skin: SWarm to touch

NEURO: Awake, alert and oriented x3. No facial 

droop or focal deficit





Diagnosis, Assessment Plan

Free text A P:

Patient is a 69-year-old male with history of 

tobacco abuse and COPD in the

CVICU for evaluation of coronary artery bypass 

graft surgery for multivessel

coronary artery disease



Acute on chronic hypoxic respiratory failure

Acute exacerbation of COPD on home oxygen 2 to 4 

L

Tobacco abuse

Multivessel coronary artery disease



-DuoNebs, Zithromax, pulmonary consult. Will add 

ceftriaxone. Send sputum

cultures.

-Started on budesonide nebs and ipratropium nebs.

-SpO2 goal of 88%, wean supplemental oxygen as 

tolerated, incentive spirometry,

pulmonary hygiene

-Duplexes of lower extremities

-Influenza and COVID swab negative

-Preop labs and workup per CT surgery

-Will use BiPAP as needed

-Started on beta-blocker

Repeat BMP

SCDs for DVT prophylaxis

Full code



Critical care time 35 minutes spent with patient 

excluding bedside procedures

and teach



Electronically Signed by Delphine Stock MD on 

25 at 1611



RPT #:[id number]

***END OF REPORT***                                 [facility]

 

                                        2025 13:24:00 

[facility] ([facility])

Pulmonary Consultation Note

REPORT#:[id number] REPORT STATUS: Signed

REPORT INITIALIZATION DATE:25 TIME: 132



PATIENT: EPIFANIO QUINONES UNIT #: [id number]

ACCOUNT#: [id number] ROOM/BED: [place] [address]

: 54 AGE: 69 SEX: M ATTEND: 

Bong Corey MD

ADM DT: 25 AUTHOR: Lena Henry MD

REPT SERVICE DT/TIME: 25 1324

* ALL edits or amendments must be made on the 

electronic/computer document *





History of Present Illness



HPI

Requesting clinician: CT surgery

Reason for consult:

COPD exacerbation

Chief complaint:

SOB

PCP:

PCP: No Primary or Family Physician



HPI:

70 yo M with h/o COPD, chronic hypoxia on home 

oxygen who presented for OHS in

[place] with worsening SOB and productive 

cough. She was treated there for

COPD exacerbation and pneumonia. She had LHC 

which showed mvd. She was

transfered to [facility] for further care. She has had 

productive cough with yellow

sputum. She is a chronic smoker - 1 pack per day. 

Pt has required BIPAP during

hospital course.

Hx Obtained From Patient, Prior medical records



History - Adult longitudinal

Additional medical history:

COPD and HTN

Alcohol use: Denies EtOH use

Drug use: Denies recreational drugs

Smoking status for patients 13 years old or 

older: Current every day smoker

Date last smoked: 25

Packs per day: 1

Years smoked: 67

Pack years: 67

Allergies:

Coded Allergies:

No Known Allergies (25)





Review of Systems

Constitutional:

Reports: fatigue. Denies: fever.

Skin:

Denies: ecchymosis, itching.

Allergy/Immun:

Denies: itching, rhinorrhea.

Eyes:

Denies: eye pain, photophobia.

ENT:

Denies: nose bleeding, sore throat.

Respiratory:

Reports: CARRERO (dyspnea on exertion), pneumonia, 

productive cough (sputum), SOB,

wheezing.

Cardiovascular:

Denies: chest pain, palpitations.

GI:

Denies: diarrhea, hematemesis, melena.

:

Denies: dysuria, hematuria.

Heme:

Denies: bruising, petechiae.

Endocrine:

Denies: polydipsia, polyphagia.

Neuro:

Denies: numbness, seizure.

Psych:

Denies: anxiety, confusion.



Objective



Physical Exam

Vitals:

Last Documented:

Result Date Time

O2 Delivery Nasal cannula  1200

O2 Flow Rate 2  1200

Pulse Ox 93  1200

B/P  143/72  1200

B/P Mean 98  1200

Pulse 94  1200

Resp  26  1200

FiO2 40  0809

Temp 98.2  0800





General appearance: alert, awake

Head/eyes: atraumatic, normocephalic

ENT:

ENT: normal dentition, normal nose

Neck: full range of motion, non-tender, no 

lymphadenopathy

Cardiovascular: normal heart sounds, normal 

S1/S2, no murmur

Respiratory/chest: on oxygen, wheezing, aerating 

well, clear to auscultation,

symmetric expansion

Abdomen: soft, non-tender, normal bowel sounds

Genitourinary: no bladder distention, no flank 

pain

Extremities: moves all, normal capillary refill

Musculoskeletal: full range of motion, normal 

inspection, painless range of

motion

Neuro/CNS: alert, oriented X 3, CNII-XII intact

Skin: dry, intact





Diagnosis, Assessment Plan



Free Text DxA P Notes

Free Text DxA P Notes:

Assessment:

Acute on chronic hypoxic and hypercarbic 

respiratory failure

COPD with acute exacerbation

Acute bronchitis

Multivessel CAD

Heavy tobacco smoker

Marijuana use



Work-up/Imaging - personally reviewed

CT Chest, 25 - centrilobular emphysema with 

apical predominance, SOILA

scarring, bronchial wall thickening

CXR, 25 - hyperinflation, prominent vascular 

markings

BNP elevated - 174

COVID and Flu negative



Recommendations:

Pt currently in acute COPD exacerbation with 

wheezing and increased sputum

production.

Scheduled steroids - IV Solumedrol 40mg PO q8h

Bronchodilators scheduled, Atrovent and 

Levalbuterol

As needed bronchodilators

Inhaled corticosteroid

IV antibiotics - Rocephin and azithromycin

Sputum culture pending

Check VBG

Continue supplemental oxygen, pt on HFNC 4-5L; 

wean to maintain O2 sat 90-94%

BIPAP while sleeping and as needed

Smoking cessation coounseling provided

Pt undergoing CABG evaluation; recommend 

improvement in respiratory status prior

to surgical intervention



Thank you for this consult. Will continue to 

follow patient.

Plan discussed with pt, pt's family and ICU team.



Lena Henry MD

Pulmonary Medicine



Electronically Signed by Lena Henry MD on 

25 at 1614



RPT #:[id number]

***END OF REPORT***                                 [facility]

 

                                        2025 11:56:00 

[facility]

Adult General Consultation

REPORT#:[id number] REPORT STATUS: Signed

REPORT INITIALIZATION DATE:25 TIME: 115



PATIENT: EPIFANIO QUINONES UNIT #: [id number]

ACCOUNT#: [id number] ROOM/BED: [place] [address]

: 54 AGE: 69 SEX: M ATTEND: 

Bong Corey MD

ADM DT: 25 AUTHOR: Jesu Hartman NP

REPT SERVICE DT/TIME: 25 1156

* ALL edits or amendments must be made on the 

electronic/computer document *





History of Present Illness

Requesting Clinician: Dr. Corey

Reason for consult:

Medical management

Chief complaint:

SOB, CP

PCP:

PCP: No Primary or Family Physician



HPI:

69-year-old gentleman with a past medical history 

of COPD, on home oxygen, 4 L

who presented to the hospital at [place] for 

complaints of worsening

shortness of breath productive cough,. Patient 

was treated for COPD

exacerbation and pneumonia with antibiotics. He 

underwent heart catheterization

at [facility] multivessel coronary 

artery disease. The patient does

admit to productive cough with yellow sputum. 

Patient denies any history of PE

and DVT. Denies any nausea vomiting diarrhea. 

Patient was transferred to [facility] for further workup and evaluation for 

coronary bypass graft surgery



History - Adult longitudinal

Additional medical history:

COPD and HTN

Alcohol use: Denies EtOH use

Drug use: Denies recreational drugs

Smoking status for patients 13 years old or 

older: Current every day smoker

Date last smoked: 25

Packs per day: 1

Years smoked: 67

Pack years: 67

Medications:

Home Medications:

Medication Dose/Rte/Freq Days Qty Entered Last

Max Daily Dose Reviewed

1 PUFF INH DAILY 25

Fluticasone/Umeclidin/Vilan

ter

(TRELEGY ELLIPTA 0901 0903

200-62.5-25)

Strength: 200-62.5

BLST.W.DEV

ALBUTEROL (ACCUNEB) 1.25 MG INH 25

Strength: 1.25 MG/3 ML NEB RTQ4H PRN PRN copd 

902 0903

IPRATROPIUM/ALBUTEROL 3 ML NEB 25

(DUONEB 0.5 MG-3/3ML) RTQ4H PRN PRN copd 903

Strength: 0.5 MG-3 MG (2.5

MG BASE)/3 ML NEB



Current Hospital Medications:

Anti-Infective Agents

Sig/Rossi Start time Last

Medication Dose Route Stop Time Status Admin

Ceftriaxone Sodium 1,000 MG Q24H  1000 AC 



(ROCEPHIN 1000MG IV  0959 1101

VIAL)

Sodium Chloride 10 ML

(SODIUM CHLORIDE)

Azithromycin 500 MG Q24H  1730 AC 

(ZITHROMAX) IV  1729 1835

Sodium Chloride 250 ML

(SODIUM CHLORIDE

0.9%)



Autonomic Drugs

Sig/Rossi Start time Last

Medication Dose Route Stop Time Status Admin

Ipratropium Bromide 500 MCG RTQ6H  1500 AC

(ATROVENT) INH  1459

Levalbuterol HCl 1.25 MG RTQ4H PRN PRN  0945 

AC

(Levalbuterol 1.25 INH  0944

mg/3 mL NEB)

Albuterol/Ipratropium 3 ML RTQ4H PRN PRN  

1730 DC 

(DUONEB) NEB  1729 0809



Cardiovascular Drugs

Sig/Rossi Start time  Last

Medication Dose Route Stop Time Status Admin

Metoprolol Tartrate 12.5 MG Q12HR  0930 AC 



(LOPRESSOR)  PO  0929 0925



Central Nervous System Agents

Sig/Rossi Start time Last

Medication Dose Route Stop Time Status Admin

Aspirin 81 MG DAILY  173 AC 

(ASPIRIN) PO  1729 0904

Magnesium Sulfate 50 ML ONCE ONE  173 DC 



(MAGNESIUM SULFATE IV 1929 1835

2GM/SWFI 50ML)



Electrolytic, Caloric, And Amy

Sig/Rossi Start time Last

Medication Dose Route Stop Time Status Admin

Sodium Zirconium 10 GM ONCE  DC 



Cyclosilicate PO  0931 1101

(Lokelma)



Eye, Ear, Nose And Throat (Een

Sig/Rossi Start time Last

Medication Dose Route Stop Time Status Admin

Budesonide 0.5 MG RTBID 915 AC  

(PULMICORT RESPULES) INH  0914 1005



Hormones And Synthetic Substit

Sig/Rossi Start time Last

Medication Dose Route Stop Time Status Admin

Methylprednisolone 40 MG ONCE  DC 



Sodium Succinate IV   0916 0925

(Solu-Medrol 40 MG

Vial)



Pharmaceutical Aids

Sig/Rossi Start time Last

Medication Dose Route Stop Time Status Admin

Sterile Water 1 ML ASDIR  AC 

(WATER FOR INJECTION) IV  0929 0924





Allergies:

Coded Allergies:

No Known Allergies (25)





Review of Systems

Constitutional:

fatigue, generalized weakness.

Eyes:

Denies: redness, visual loss/blurred, diplopia, 

photophobia.

ENT:

Denies: earache, mouth pain, nose bleeding, 

throat pain, tongue pain.

Respiratory:

Reports: CARRERO (dyspnea on exertion), productive 

cough (sputum), SOB, wheezing.

Cardiovascular:

Denies: CARRERO (dyspnea on exertion), orthopnea, 

palpitations.

GI:

Denies: anorexia, diarrhea, hematochezia.

:

Denies: flank pain, nocturia, penile discharge, 

penile lesion, testicular pain.

Endocrine:

Denies: polydipsia, polyphagia, polyuria.

Neuro:

Denies: bowel dysfunction, dizziness, 

lightheaded, slurred speech.

Psych:

Denies: anxiety, change in mental status, 

delusional, stress.



Objective

VS/I O:

Last Documented:

Result Date Time

Pulse Ox 97  1100

B/P 154/76  1100

B/P Mean 108   1100

Pulse 91  1100

Resp 33  1100

O2 Delivery Nasal cannula  1006

O2 Flow Rate 4  1006

FiO2 40  0809

Temp 36.8  0800



24 hour I O ending at 0700:

 0700  1900

Intake Total 600.00

Output Total 775

Balance -175.00



Intake, .00

Intake, Oral 300

Output, Urine 775

Patient 64.9 kg 67 kg

Weight

Weight Bed scale Not applicable

Measurement

Method



PATIENT WEIGHT:



Weight (lb): 143

Weight (oz): 1.28

Weight (kg): 64.900



General appearance: respiratory support, alert, 

awake

Head/Eyes: atraumatic, normocephalic, PERRLA

Neck: full range of motion, no JVD, no 

lymphadenopathy

Cardiovascular: normal capillary refill, pedal 

pulses present

Respiratory: aerating well, symmetric expansion, 

clear to auscultation

Abdomen: soft

Extremities: moves all

Musculoskeletal: no CVA tenderness, no muscle 

spasm

Neuro/CNS: alert



Results

Findings/Data:

Laboratory Tests:



0935 0935 0219 

Chemistry

Sodium (134 - 147 mEq/L) 135 137

Potassium (3.4 - 5.0 mEq/L) 5.0 5.5 H

Chloride (100 - 108 mEq/L)  102 103

Carbon Dioxide (21 - 33 mEq/l) 34 H 33

Anion Gap (0 - 20) 4 7

BUN (7 - 25 mg/dL) 30 H 28 H

Creatinine (0.6 - 1.3 mg/dL) 1.1 1.2

Glomerular Filtr Rate (70 - 80)  72.2 65.1 L

Glucose (77 - 141 mg/dL) 131 152 H

Lactic Acid (0.4 - 1.9 mmol/L) 1.6

Calcium (8.0 - 10.5 mg/dL) 8.3 8.8

Ionized Calcium Barrett (1.09 - 1.30 MMOL/L) 1.12

Phosphorus (2.5 - 4.9 MG/DL)  3.9

Magnesium (1.6 - 2.6 mg/dL) 2.64 H

Total Bilirubin (0.0 - 1.0 mg/dL) 0.50

AST (8 - 34 IUnit/L) 60 H

ALT (10 - 49 IUnit/L) 48

Total Alk Phosphatase (20 - 125 IUnit/L)  93

B-Natriuretic Peptide (0 - 100 PG/ML) 174.0 H

Total Protein (5.7 - 8.2 g/dL) 6.8

Albumin (3.4 - 5.0 g/dL)  3.40

Hematology

WBC (4.5 - 11.0 x10 3/uL) 11.2 H

RBC (4.00 - 5.60 x10 6/uL) 4.64

Hgb (12.5 - 16.9 g/dL) 14.5

Hct (37.5 - 50.7 %) 44.1

MCV (81.0 - 99.0 fL)  95.0

MCH (27.0 - 33.0 pg) 31.3

MCHC (33.0 - 37.0 g/dL) 32.9 L

RDW (11.5 - 14.5 %) 14.9 H

Plt Count (150 - 400 x10 3/uL) 243

MPV (7.0 - 9.0 fL)  9.7 H

Neut % (Auto) (56.0 - 77.0 %) 91.1 H

Lymph % (Auto) (14.0 - 32.0 %) 3.8 L

Mono % (Auto) (4.8 - 9.0 %) 4.5 L

Eos % (Auto) (0.3 - 3.7 %) 0.0 L

Baso % (Auto) (0.0 - 2.0 %)  0.1

Neut # (Auto) (2.0 - 7.6 x10 3/uL) 10.17 H

Lymph # (Auto) (1.0 - 3.8 x10 3/uL) 0.42 L

Mono # (Auto) (0.1 - 0.8 x10 3/uL) 0.50

Eos # (Auto) (0.0 - 0.2 x10 3/uL) 0.00

Baso # (Auto) (0.0 - 0.2 x10 3/uL)  0.01

Abs Immat Gran (auto) (0.00 - 0.03 x10 3/uL) 0.06 

H

Immature Gran % (0.0 - 2.0 %) 0.5

Nucleated RBC % (0 - 0 %)  0.0

Nucleated RBCs # (Man) (0.0 - 0.1 x10 3/uL) 0.00



 190 1806

Chemistry

Sodium (134 - 147 mEq/L) 135

Potassium (3.4 - 5.0 mEq/L) 5.4 H

Chloride (100 - 108 mEq/L) 103

Carbon Dioxide (21 - 33 mEq/l) 27

Anion Gap (0 - 20)  10

BUN (7 - 25 mg/dL) 24

Creatinine (0.6 - 1.3 mg/dL) 1.3

Glomerular Filtr Rate (70 - 80) 59.1 L

Glucose (77 - 141 mg/dL) 138

Hemoglobin A1c (4.8 - 6.0 %A1C) 5.5

Calcium (8.0 - 10.5 mg/dL) 9.0

Hematology

WBC (4.5 - 11.0 x10 3/uL) 10.7

RBC (4.00 - 5.60 x10 6/uL) 5.46

Hgb (12.5 - 16.9 g/dL) 16.5

Hct (37.5 - 50.7 %) 51.8 H

MCV (81.0 - 99.0 fL)  94.9

MCH (27.0 - 33.0 pg) 30.2

MCHC (33.0 - 37.0 g/dL) 31.9 L

RDW (11.5 - 14.5 %) 15.3 H

Plt Count (150 - 400 x10 3/uL) 199

MPV (7.0 - 9.0 fL) 11.1 H

Neut % (Auto) (56.0 - 77.0 %) 92.8 H

Lymph % (Auto) (14.0 - 32.0 %)  2.9 L

Mono % (Auto) (4.8 - 9.0 %) 3.0 L

Eos % (Auto) (0.3 - 3.7 %) 0.2 L

Baso % (Auto) (0.0 - 2.0 %) 0.2

Neut # (Auto) (2.0 - 7.6 x10 3/uL) 9.94 H

Lymph # (Auto) (1.0 - 3.8 x10 3/uL) 0.31 L

Mono # (Auto) (0.1 - 0.8 x10 3/uL) 0.32

Eos # (Auto) (0.0 - 0.2 x10 3/uL)  0.02

Baso # (Auto) (0.0 - 0.2 x10 3/uL) 0.02

Abs Immat Gran (auto) (0.00 - 0.03 x10 3/uL) 0.10 

H

Immature Gran % (0.0 - 2.0 %) 0.9

Nucleated RBC % (0 - 0 %) 0.0

Nucleated RBCs # (Man) (0.0 - 0.1 x10 3/uL) 0.00

Immature Plt Fraction (0.9 - 11.2 %) 11.4 H

Plt Morphology Comment AGGREGATES

ESR Westergren (0 - 15 mm/hr) 18 H

Urines

Urine Color (YEL/STRAW) YELLOW

Urine Appearance (CLEAR)  CLEAR

Urine pH (5.0 - 7.0) 5.0

Ur Specific Gravity (1.005 - 1.030) 1.058 H

Urine Protein (NEGATIVE)  NEGATIVE

Urine Glucose (UA) (NEGATIVE) NEGATIVE

Urine Ketones (NEGATIVE) NEGATIVE

Urine Blood (NEGATIVE)  NEGATIVE

Urine Nitrite (NEGATIVE) NEGATIVE

Urine Bilirubin (NEGATIVE) NEGATIVE

Urine Urobilinogen (0.2 - 1.0 mg/dL) 0.2

Ur Leukocyte Esterase (NEGATIVE) NEGATIVE

Urine RBC (0 - 3 RBC/HPF) 0-3

Urine WBC (0 - 3 WBC/HPF) 0-3

Ur Squamous Epith Cells (NONE SEEN /HPF) NONE 

SEEN

Urine Bacteria (NONE SEEN /HPF)  TRACE

Hyaline Casts (NONE SEEN /LPF) 0-2

Urine Mucus (NONE SEEN /LPF) TRACE





1805 1805 1805 1740

Chemistry

Sodium (134 - 147 mEq/L)  137

Potassium (3.4 - 5.0 mEq/L) 5.7 H

Chloride (100 - 108 mEq/L) 101

Carbon Dioxide (21 - 33 mEq/l) 31

Anion Gap (0 - 20) 11

BUN (7 - 25 mg/dL) 25

Creatinine (0.6 - 1.3 mg/dL) 1.3

Glomerular Filtr Rate (70 - 80) 59.1 L

Glucose (77 - 141 mg/dL)  146 H

Calcium (8.0 - 10.5 mg/dL) 9.5

Magnesium (1.6 - 2.6 mg/dL) 2.36

Total Bilirubin (0.0 - 1.0 mg/dL) 0.50

AST (8 - 34 IUnit/L) 39 H

ALT (10 - 49 IUnit/L) 51 H

Total Alk Phosphatase (20 - 125 IUnit/L) 116

Troponin I High Sens (0 - 54 ng/L) 469 *H

C-Reactive Protein (<5.0 MG/L) 13.2 H

B-Natriuretic Peptide (0 - 100 PG/ML) 85.0

Total Protein (5.7 - 8.2 g/dL) 8.1

Albumin (3.4 - 5.0 g/dL)  4.10

Triglycerides (40 - 150 mg/dL) 148

Cholesterol (<200 mg/dL) 270 H

LDL Cholesterol Measurd (0 - 100 mg/dL) 143.0 H

HDL Cholesterol (40 - 60 MG/DL) 109.9 H

Cholesterol/HDL Ratio (3.43 - 4.97 RATIO) 2.46 L

Coagulation

INR (0.8 - 1.2) 1.0

PTT ([place]if[place]) (25.0 - 39.5 Seconds) 21.9 L

PT Patient/Control Mix (9.3 - 12.9 11.7

SECONDS)

Serology

SARS-CoV-2 Ag (Rapid) (Negative) Negative



Microbiology:

Date/Time  Procedure - Status

Source Growth

 0935 Sputum Culture - RES

SPUTUM

 0935 Gram Stain - RES

SPUTUM

 0219 MRSA DNA Surveillance Screen - RECD

NASAL

 1740 MSSA Surveillance Screen - COMP

NASAL

 1740 MRSA DNA Surveillance Screen - COMP

NASAL

 1740 Influenza Virus Type B Antigen - COMP

NASAL

 1740 Influenza Virus Type A Antigen - COMP

NASAL



Recent Impressions:

RADIOLOGY - XR CHEST 1 V  1702

*** Report Impression - Status: SIGNED Entered: 

2025 1810



Impression:



1. No radiographic evidence of acute 

cardiopulmonary disease.





Impression By: Albert Munoz M.D.

ULTRASOUND - DUP EXTRACRANIAL PRAKASH  1801

*** Report Impression - Status: SIGNED Entered: 

2025 1833



IMPRESSION:



In the left carotid bifurcation there is high 

velocity through the

internal carotid artery as outlined above with 

stenosis estimate of

approximately 50-69%. On the right carotid 

bifurcation the flow

velocity estimate is less than 50%.



Please see above for follow-up imaging 

suggestions.



Both vertebral arteries exhibit antegrade blood 

flow.

Impression By: [email or web address] - Cecil Pisano M.D.

ULTRASOUND - [facility] 1802

*** Report Impression - Status: SIGNED Entered: 

2025 1823



Impression: Venous mapping as described above.

Impression By: [email or web address] - Heriberto Munoz M.D.

RADIOLOGY - XR CHEST 1 V  0606

*** Report Impression - Status: SIGNED Entered: 

2025 09



IMPRESSION:



Negative for acute chest process.



Mild biapical pleural parenchymal scarring, left 

greater than right.

Impression By: [email or web address] - Marco Reilly D.O.





Results: labs reviewed, vital signs reviewed, 

vital signs stable, x-ray

personally reviewed, current med profile rev'd





Diagnosis, Assessment Plan

Plan discussed with: patient, admitting 

physician, consultants, nurse



Code Status/Resusc. Discussion

Code status: full code



Free Text DxA P Notes

Free Text DxA P Notes:

Assessment and plan:



Multivessel CAD.

Acute COPD excerbation.

SOB due to CAD.

HX of COPD and PNA.



Plan:



CVICU.

Pulmonary for COPD.

CV surgery for CABG eval.

Monitor respirartory status, breathing tx, o2 

support.

IS.

PFTs.

Pain meds.

Antiemetics.

Cough meds.

Follow labs and replace as needed.

Continue home meds.

Monitor.



Electronically Signed by Jesu Hartman NP on 

25 at 1322

Electronically Signed by Amelia Justice MD on 

25 at 1344



RPT #:[id number]

***END OF REPORT***                                 [facility]

 

                                        2025 09:25:00 

[facility] ([facility])

Cardiology Consultation

REPORT#:[id number] REPORT STATUS: Signed

REPORT INITIALIZATION DATE:25 TIME: 925



PATIENT: EPIFANIO QUINONES  UNIT #: [id number]

ACCOUNT#: [id number] [facility]

: 54 AGE: 69 SEX: M ATTEND: 

Bong Corey MD

ADM DT: 25  AUTHOR: Lena Fritz 

AGACNP

REPT SERVICE DT/TIME: 25 6088

* ALL edits or amendments must be made on the 

electronic/computer document *





History of Present Illness



HPI

Requesting Clinician: DR. Corey

Reason for consult:

CAD

Chief complaint:

Shortness of breath

PCP:

PCP: No Primary or Family Physician



HPI:

68 YO male with MH of COPD, lifelong heavy smoker 

who initially presented at [facility] with shortness of breath attributed to 

COPD exacerbation and

pneumonia. He was eventually discharged but 

presented back to the hospital with

persistent shortness of breath which prompted 

coronary angiogram which revealed

multivessel CAD. The patient is transferred to 

our facility for surgical

revascularization evaluation.

Hx Obtained From Patient, Prior medical records



History - Adult longitudinal

Past medical history:

Reports: COPD, Hypertension. Denies: 

Alcoholism/subst abuse, Atrial

fibrillation, Congestive heart failure, Coronary 

artery disease, Kidney disease/

stones.

Additional surgical history:

Denies PSH

Alcohol use: Denies EtOH use

Drug use: Denies recreational drugs

Smoking status for patients 13 years old or 

older: Current every day smoker

Date last smoked: 25

Packs per day: 1

Years smoked: 67

Pack years: 67

Home medications:

Home Medications:

Fluticasone/Umeclidin/Vilanter (TRELEGY ELLIPTA 

200-62.5-25) 1 PUFF INH DAILY

ALBUTEROL (ACCUNEB) 1.25 MG INH RTQ4H PRN PRN 

copd

IPRATROPIUM/ALBUTEROL (DUONEB 0.5 MG-3/3ML) 3 ML 

NEB RTQ4H PRN PRN copd





Allergies:

Coded Allergies:

No Known Allergies (25)



Ambulatory status: Independent



Review of Systems

Constitutional:

fatigue, generalized weakness.

Respiratory:

Reports: pneumonia, productive cough (sputum), 

SOB, wheezing.

Cardiovascular:

Reports: dyspnea on exertion, orthopnea, parox 

noctural dyspnea. Denies: chest

pain, edema, palpitations, unstable angina.

GI:

Denies: abdominal pain, nausea, vomiting.

:

Denies: dysuria.

Musculoskeletal:

Denies: extremity swelling.

Neuro:

weakness. Denies: change in LOC, confusion, 

syncope.



Objective



General

VS/I O:

Vital Signs:

Date Time Temp Pulse Resp B/P B/P Pulse O2 O2 

Flow FiO2

Mean Ox Delivery Rate

 0911  95 High flow 2

nasal

cannula

 0857  98 High flow 5

nasal

cannula

 0809  91 100 40

 0809 100 BiPAP 40

 0600 104 25 142/74 101 99

 0500 90 22 143/73 102 98

 0403 99 23 149/70 101 100

 0400 36.9

 0400 High flow 6

nasal

cannula

 0350 86 98  40

 0306 114 30 163/78 112 97

 0200 94 97/53 67 97

 0101 101 37 144/63 90 99

 0012 103 98 40

 0000 36.8

 0000 BiPAP

 0000 97 20 124/59 84 98

 2300 91 15 117/58 83 98

 2200 93 20 111/59 82 98

 2100 96 25 106/54 75 98

 2045 102 100 40

2024 96 High flow 6

nasal

cannula

2000 36.9 High flow 6

nasal

cannula

2000 High flow 6

nasal

cannula

2000 111 30 144/65 94 99

 1900 115 39 141/70  96 96

 1801 107 23 148/68 98 97

 1748 98 Nasal 5

cannula

 1629 37.0

 1629 High flow 4

nasal

cannula



24 hour I O ending at 0700:

 0700  1900

Intake Total 600.00

Output Total 775

Balance -175.00



Intake, .00

Intake, Oral 300

Output, Urine 775

Patient 64.9 kg 67 kg

Weight

Weight Bed scale Not applicable

Measurement

Method



PATIENT WEIGHT:



Weight (lb): 143

Weight (oz): 1.28

Weight (kg): 64.900



Medications:

Active Meds + DC'd Last 24 Hrs

Ipratropium Bromide (ATROVENT) 500 MCG RTQ6H INH 

(UNV)

Metoprolol Tartrate (LOPRESSOR) 12.5 MG Q12HR PO

Sterile Water (WATER FOR INJECTION) 1 ML ASDIR 

PRN IV

Budesonide (PULMICORT RESPULES) 0.5 MG RTBID INH

Methylprednisolone Sodium Succinate (Solu-Medrol 

40 MG Vial) 40 MG ONCE ONE

IV (DC)

Albuterol/Ipratropium (DUONEB) 3 ML RTQ4H PRN PRN 

NEB

Aspirin (ASPIRIN) 81 MG DAILY PO

Azithromycin (ZITHROMAX) 500 MG Q24H IV

Sodium Chloride (SODIUM CHLORIDE 0.9%) 250 ML

Magnesium Sulfate (MAGNESIUM SULFATE 2GM/SWFI 

50ML) 50 ML ONCE ONE IV (

DC)







Physical Exam

General appearance: chronically ill appearing, 

frail, alert, awake, oriented

Neck: non-tender, no JVD

Cardiovascular:

CV assessment: tachycardia

Respiratory: accessory muscle use, on oxygen, 

shortness of breath, wheezing

Abdomen: soft, non-tender, normal bowel sounds, 

no distention

Genitourinary: no urinary catheter

Lower extremity:

LE assessment: no calf tenderness, no edema

Musculoskeletal: normal inspection

Neuro/CNS: alert, oriented X 3, normal speech

Psychiatry: normal affect, normal mood



Results

Findings/Data:

Laboratory Tests

 180 1805

Chemistry

Sodium (134 - 147 mEq/L)  137 135

Potassium (3.4 - 5.0 mEq/L) 5.5 H 5.4 H

Chloride (100 - 108 mEq/L) 103 103

Carbon Dioxide (21 - 33 mEq/l) 33 27

Anion Gap (0 - 20) 7 10

BUN (7 - 25 mg/dL) 28 H 24

Creatinine (0.6 - 1.3 mg/dL) 1.2 1.3

Glomerular Filtr Rate (70 - 80) 65.1 L 59.1 L

Glucose (77 - 141 mg/dL) 152 H 138

Hemoglobin A1c (4.8 - 6.0 %A1C) 5.5

Lactic Acid (0.4 - 1.9 mmol/L) 1.6

Calcium (8.0 - 10.5 mg/dL) 8.8 9.0

Ionized Calcium Barrett (1.09 - 1.30 1.12

MMOL/L)

Phosphorus (2.5 - 4.9 MG/DL) 3.9

Magnesium (1.6 - 2.6 mg/dL) 2.64 H

Total Bilirubin (0.0 - 1.0 mg/dL) 0.50

AST (8 - 34 IUnit/L) 60 H

ALT (10 - 49 IUnit/L) 48

Total Alk Phosphatase (20 - 125 93

IUnit/L)

C-Reactive Protein (<5.0 MG/L) 13.2 H

Total Protein (5.7 - 8.2 g/dL) 6.8

Albumin (3.4 - 5.0 g/dL) 3.40



 1805

Chemistry

Sodium (134 - 147 mEq/L) 137

Potassium (3.4 - 5.0 mEq/L) 5.7 H

Chloride (100 - 108 mEq/L) 101

Carbon Dioxide (21 - 33 mEq/l) 31

Anion Gap (0 - 20) 11

BUN (7 - 25 mg/dL)  25

Creatinine (0.6 - 1.3 mg/dL) 1.3

Glomerular Filtr Rate (70 - 80) 59.1 L

Glucose (77 - 141 mg/dL) 146 H

Calcium (8.0 - 10.5 mg/dL) 9.5

Magnesium (1.6 - 2.6 mg/dL) 2.36

Total Bilirubin (0.0 - 1.0 mg/dL) 0.50

AST (8 - 34 IUnit/L) 39 H

ALT (10 - 49 IUnit/L)  51 H

Total Alk Phosphatase (20 - 125 IUnit/L) 116

Troponin I High Sens (0 - 54 ng/L) 469 *H

B-Natriuretic Peptide (0 - 100 PG/ML) 85.0

Total Protein (5.7 - 8.2 g/dL) 8.1

Albumin (3.4 - 5.0 g/dL) 4.10

Triglycerides (40 - 150 mg/dL) 148

Cholesterol (<200 mg/dL) 270 H

LDL Cholesterol Measurd (0 - 100 mg/dL) 143.0  H

HDL Cholesterol (40 - 60 MG/DL) 109.9 H

Cholesterol/HDL Ratio (3.43 - 4.97 RATIO) 2.46 L



Laboratory Tests

1805

Coagulation

INR (0.8 - 1.2)  1.0

PTT ([place]if[place]) (25.0 - 39.5 Seconds) 21.9 L

PT Patient/Control Mix (9.3 - 12.9 SECONDS) 11.7



Laboratory Tests

 180

Hematology

WBC (4.5 - 11.0 x10 3/uL) 11.2 H 10.7

RBC (4.00 - 5.60 x10 6/uL) 4.64 5.46

Hgb (12.5 - 16.9 g/dL) 14.5 16.5

Hct (37.5 - 50.7 %) 44.1 51.8 H

MCV (81.0 - 99.0 fL) 95.0 94.9

MCH (27.0 - 33.0 pg) 31.3 30.2

MCHC (33.0 - 37.0 g/dL) 32.9 L 31.9 L

RDW (11.5 - 14.5 %)  14.9 H 15.3 H

Plt Count (150 - 400 x10 3/uL) 243 199

MPV (7.0 - 9.0 fL) 9.7 H 11.1 H

Neut % (Auto) (56.0 - 77.0 %) 91.1 H 92.8 H

Lymph % (Auto) (14.0 - 32.0 %) 3.8 L 2.9 L

Mono % (Auto) (4.8 - 9.0 %)  4.5 L 3.0 L

Eos % (Auto) (0.3 - 3.7 %) 0.0 L 0.2 L

Baso % (Auto) (0.0 - 2.0 %) 0.1  0.2

Neut # (Auto) (2.0 - 7.6 x10 3/uL) 10.17 H 9.94 H

Lymph # (Auto) (1.0 - 3.8 x10 3/uL) 0.42 L 0.31 L

Mono # (Auto) (0.1 - 0.8 x10 3/uL) 0.50 0.32

Eos # (Auto) (0.0 - 0.2 x10 3/uL) 0.00 0.02

Baso # (Auto) (0.0 - 0.2 x10 3/uL) 0.01  0.02

Abs Immat Gran (auto) (0.00 - 0.03 x10 3/uL) 0.06 

H 0.10 H

Immature Gran % (0.0 - 2.0 %) 0.5 0.9

Nucleated RBC % (0 - 0 %) 0.0 0.0

Nucleated RBCs # (Man) (0.0 - 0.1 x10 3/uL) 0.00 

0.00

Immature Plt Fraction (0.9 - 11.2 %)  11.4 H

Plt Morphology Comment AGGREGATES

ESR Westergren (0 - 15 mm/hr) 18 H



Laboratory Tests

1740

Serology

SARS-CoV-2 Ag (Rapid) (Negative) Negative



Laboratory Tests



190

Urines

Urine Color (YEL/STRAW) YELLOW

Urine Appearance (CLEAR) CLEAR

Urine pH (5.0 - 7.0) 5.0

Ur Specific Gravity (1.005 - 1.030) 1.058 H

Urine Protein (NEGATIVE) NEGATIVE

Urine Glucose (UA) (NEGATIVE) NEGATIVE

Urine Ketones (NEGATIVE) NEGATIVE

Urine Blood (NEGATIVE) NEGATIVE

Urine Nitrite (NEGATIVE) NEGATIVE

Urine Bilirubin (NEGATIVE) NEGATIVE

Urine Urobilinogen (0.2 - 1.0 mg/dL) 0.2

Ur Leukocyte Esterase (NEGATIVE) NEGATIVE

Urine RBC (0 - 3 RBC/HPF) 0-3

Urine WBC (0 - 3 WBC/HPF)  0-3

Ur Squamous Epith Cells (NONE SEEN /HPF) NONE 

SEEN

Urine Bacteria (NONE SEEN /HPF) TRACE

Hyaline Casts (NONE SEEN /LPF)  0-2

Urine Mucus (NONE SEEN /LPF) TRACE



Microbiology

Date/Time Procedure - Status

Source Growth

1740 MSSA Surveillance Screen - COMP

NASAL

1740 MRSA DNA Surveillance Screen - COMP

NASAL

1740 Influenza Virus Type B Antigen - COMP

NASAL

1740 Influenza Virus Type A Antigen - COMP

NASAL



Laboratory Tests



0219 1805 1805

Chemistry

Magnesium (1.6 - 2.6 mg/dL) 2.64 H 2.36

Troponin I High Sens (0 - 54 ng/L) 469 *H

B-Natriuretic Peptide (0 - 100 PG/ML) 85.0





Radiology Data:

Recent Impressions:

RADIOLOGY - XR CHEST 1 V  1702

*** Report Impression - Status: SIGNED Entered: 

2025 1810



Impression:



1. No radiographic evidence of acute 

cardiopulmonary disease.





Impression By: Albert Munoz M.D.

ULTRASOUND - DUP EXTRACRANIAL PRAKASH  1801

*** Report Impression - Status: SIGNED Entered: 

2025 1833



IMPRESSION:



In the left carotid bifurcation there is high 

velocity through the

internal carotid artery as outlined above with 

stenosis estimate of

approximately 50-69%. On the right carotid 

bifurcation the flow

velocity estimate is less than 50%.



Please see above for follow-up imaging 

suggestions.



Both vertebral arteries exhibit antegrade blood 

flow.

Impression By: [email or web address] - Cecil Pisano M.D.

ULTRASOUND - [facility]  180

*** Report Impression - Status: SIGNED Entered: 

2025 1823



Impression: Venous mapping as described above.

Impression By: [email or web address] - Heriberto Munoz M.D.

RADIOLOGY - XR CHEST 1 V  0606

*** Report Impression - Status: SIGNED Entered: 

2025 0906



IMPRESSION:



Negative for acute chest process.



Mild biapical pleural parenchymal scarring, left 

greater than right.

Impression By: [email or web address] - Marco Reilly D.O.





Results: labs reviewed, vital signs reviewed





Diagnosis, Assessment Plan

Plan discussed with: patient, collaborating MD, 

nurse



Free Text DxA P Notes

Free Text DxA P Notes:

68 YO male with MH of COPD, lifelong heavy smoker 

who initially presented at [facility] with shortness of breath attributed to 

COPD exacerbation and

pneumonia. He was eventually discharged but 

presented back to the hospital with

persistent shortness of breath which prompted 

coronary angiogram which revealed

multivessel CAD. The patient is transferred to 

our facility for surgical

revascularization evaluation.



1. Multivessel CAD

* CABG evaluation ongoing

* continue ASA

* start atorvastatin 40 mg daily

* get echocardiogram



2. COPD exacerbation/Hypoxia

lifelong tobacco abuse

recently treated for pneumonia

* pulmonary consulted



3. Hyperkalemia

* K 5.7->5.4->5.5

* give Lokelma 10 mg



Appreciate the referral.



MDM by Dr. Buck.





Electronically Signed by Lena Fritz on 25 at 1750

Electronically Signed by Marcos Buck MD on 

25 at 8316



RPT #:[id number]

***END OF REPORT***                                 [facility]

 

                                        2025 07:57:00 

[facility]

Cardiothoracic Surgery Prog

REPORT#:[id number] REPORT STATUS: Signed

REPORT INITIALIZATION DATE:25 TIME: 0757



PATIENT: EPIFANIO QUINONES UNIT #: [id number]

ACCOUNT#: [id number] ROOM/BED: [facility]

: 54 AGE: 69 SEX: M ATTEND: 

Bong Corey MD

ADM DT: 25 AUTHOR: Tanisha Flores Physic

REPT SERVICE DT/TIME: 25 8938

* ALL edits or amendments must be made on the 

electronic/computer document *





Subjective

Chief complaint:

SOB,

Multivessel CAD



Review of Systems

Constitutional:

Reports: fatigue.

Allergy/Immun:

Denies: anaphylaxis, hives, itching.

Eyes:

Denies: diplopia, eye pain.

Respiratory:

Reports: CARRERO (dyspnea on exertion), SOB.

Cardiovascular:

Reports: CARRERO (dyspnea on exertion). Denies: chest 

pain.

GI:

Denies: constipation, diarrhea.

:

Denies: dysuria, hematuria.

Musculoskeletal:

Denies: joint pain, joint swelling.

Heme:

Denies: bleeding, bruising.

All systems rev neg: except as marked



Objective



General

VS/I O

Last Documented:

Result Date Time

Pulse Ox  99  0600

B/P 142/74  0600

B/P Mean 101  0600

Pulse 104  0600

Resp 25  0600

Temp 98.4  0400

O2 Delivery High flow nasal cannula  0400

O2 Flow Rate 6  0400

FiO2 40  0350



24 hour I O ending at 0700:

 0700  1900

Intake Total 600.00

Output Total 775

Balance -175.00



Intake, .00

Intake, Oral 300

Output, Urine 775

Patient 64.9 kg 67 kg

Weight

Weight Bed scale Not applicable

Measurement

Method



PATIENT WEIGHT:



Weight (lb): 143

Weight (oz): 1.28

Weight (kg): 64.900





Physical Exam

General appearance: alert, awake, oriented

HEENT: anicteric, mucosal membranes moist

Neck: full range of motion, non-tender

Cardiovascular: tachycardia

Respiratory: crackles, decreased breath sounds

Abdomen: soft, non-tender

Extremities: dry, moves all

Neuro/CNS: alert, oriented X 3

Skin: dry





Diagnosis, Assessment Plan

Free Text A P:

This is a 69-year-old gentleman with a past 

medical history of COPD, on home

oxygen, 4 L who presented to the hospital at [facility] for complaints of

worsening shortness of breath productive cough,. 

Patient was treated for COPD

exacerbation and pneumonia with antibiotics. 

Patient then underwent heart

catheterization at [facility] 

multivessel coronary artery disease.



The patient does admit to productive cough with 

yellow sputum. Patient denies

any history of PE and DVT. Denies any nausea 

vomiting diarrhea.



Patient was transferred to [facility] for 

further workup and evaluation for

coronary bypass graft surgery



Patient reports he continues to smoke 1 pack/day.

Patient denies any alcohol use.



Patient transferred to [facility] for COPD 

exasperation and workup for

multivessel coronary disease



Assessment/plan

1. Coronary disease

2. COPD exacerbation

With recent history of pneumonia

3. Current everyday smoker





Patient will be admitted. Pulmonary consulted for 

COPD. Will order CT of the

chest. Echocardiogram ordered. Consult 

cardiology. Workup underway for

Multivessel CAD.

Further recommendations to follow.



25

Patient resting

Required BiPAP overnight.

Denies chest pains

Workup underway.

Cardiology consulted

Echocardiogram completed. Pending result

CT of the chest

Carotid Doppler shows 50 to 70% stenosis of the 

left carotid. Right carotid

shows less than 50% stenosis

Vein mapping completed

Pulmonary consulted, appreciate input.

Started on azithromycin

Patient seen and examined at [facility]. Workup 

underway. Plan of care discussed

with multidisciplinary team

Pulmonary for COPD exacerbation.

Cardiology consult.

Further recs to follow.





Electronically Signed by Tanisha Flores 

on 25 at 0810

Electronically Signed by Bong Corey MD on 

25 at 0434



RPT #:[id number]

***END OF REPORT***                                 [facility]

 

                                        2025 17:30:00 

[facility] ([facility])

Critical Care Consult Note

REPORT#:[id number] REPORT STATUS: Signed

REPORT INITIALIZATION DATE:25 TIME: 173



PATIENT: EPIFANIO QUINONES UNIT #: [id number]

ACCOUNT#: [id number] ROOM/BED: [place] [address]

: [birth year] AGE: 69 SEX: M ATTEND: 

Bong Corey MD

ADM DT: 25 AUTHOR: Roberto Snyder DO

REPT SERVICE DT/TIME: 25 1730

* ALL edits or amendments must be made on the 

electronic/computer document *





History of Present Illness



HPI

Requesting clinician: Dr Corey

Reason for consult:

cad copd

Chief complaint:

cough, sob, fatigue

HPI:

Patient is a 69-year-old male with an extensive 

history of chronic hypoxic

respiratory failure, COPD on home oxygen 2 to 4 

L. Patient also is an everyday

smoker of 1 pack/day. Patient was admitted at 

outside hospital  and

treated for COPD exacerbation and pneumonia with 

antibiotics, breathing

treatments, steroids. He left the hospital after 

4 days. He Nahed presented to

the hospital  with yellow sputum 

production, cough, shortness of

breath, fatigue. Again being treated for COPD 

exacerbation. He underwent

coronary angiography this morning and was found 

to have multivessel coronary

artery disease. He was transferred here for 

evaluation by CT surgery for

possible CABG evaluation. At the time he is 

hemodynamically stable although

hypoxic on 5 L nasal cannula. He is not having 

any chest pain. He does have a

productive cough with yellow sputum. He is 

slightly tachycardic in the low 100s

after receiving breathing treatments and with any 

exertion or transferring in

bed. He denies fever, lower extremity swelling, 

history of PE or DVT,

hemoptysis, recent travel, nausea, vomiting, 

diarrhea, abdominal pain, rash,

sick contacts. He had a negative influenza and 

COVID swab outside hospital. He

has a workup pending for CABG evaluation and is 

currently being treated for COPD

exacerbation. Will discuss further with 

pulmonology and CT surgery about

steroid plan.



History - Adult longitudinal

Smoking status for patients 13 years old or 

older: Current every day smoker

Date last smoked: 25

Packs per day: 1

Years smoked: 67

Pack years: 66

Allergies:

Coded Allergies:

No Known Allergies (25)





Objective



Physical Exam

VS/I O:

Last Documented:

Result Date Time

Temp 98.6  1629

O2 Delivery High flow nasal cannula  1629

O2 Flow Rate 4  1629



Patient Weight and BMI



Weight (kg): 67.000 BMI: 22.5





Free Text Obj Notes

Free Text Obj Notes:

GEN: Appears tachypneic and winded with 

conversation every 3 or 4 words

HEENT: Atraumatic, normocephalic, dry mucous 

membranes

NECK: Supple, good range of motion, no 

tenderness, no JVD

LUNGS: Symmetrical air entry, tachypneic, 

retractions, accessory muscle use,

diminished throughout with fine wheezing

CV: S tachycardic, warm and well perfused

GI: Abdomen is soft, not tender or distended

EXT/Musc: No edema or cyanosis. Pedal pulses 

present. Compartments soft

Skin: SWarm to touch

NEURO: Awake, alert and oriented x3. No facial 

droop or focal deficit





Diagnosis, Assessment Plan

Free text DxA P:

Patient is a 69-year-old male with history of 

tobacco abuse and COPD in the

CVICU for evaluation of coronary artery bypass 

graft surgery for multivessel

coronary artery disease



Acute on chronic hypoxic respiratory failure

Acute exacerbation of COPD on home oxygen 2 to 4 

L

Tobacco abuse

Multivessel coronary artery disease



-DuoNebs, Zithromax, pulmonary consult

-SpO2 goal of 88%, wean supplemental oxygen as 

tolerated, incentive spirometry,

pulmonary hygiene

-Duplexes of lower extremities

-Will discuss with CT surgery about DVT 

chemoprophylaxis

-Influenza and COVID swab

-Preop labs and workup per CT surgery

-Will use BiPAP as needed



Critical care time 41 minutes spent with patient 

excluding bedside procedures

and teach



Electronically Signed by Roberto Snyder DO on 

25 at 1734



RPT #:[id number]

***END OF REPORT***                                 [facility]

 

                                        2025 17:21:00 

[facility] ([facility])

History Physical - Adult

REPORT#:[id number] REPORT STATUS: Signed

REPORT INITIALIZATION DATE:25 TIME: 



PATIENT: EPIFANIO QUINONES UNIT #: [id number]

ACCOUNT#: [id number]  [facility]

: 54 AGE: 69 SEX: M ATTEND: 

Bong Corey MD

ADM DT: 25 AUTHOR: Tanisha Flores Physic

REPT SERVICE DT/TIME: 25

* ALL edits or amendments must be made on the 

electronic/computer document *





Tanisha Flores 25 172:

History of Present Illness



HPI

Chief complaint:

Shortness of breath

Coronary artery disease

HPI:

This is a 69-year-old gentleman with a past 

medical history of COPD, on home

oxygen, 4 L who presented to the hospital at [facility] for complaints of

worsening shortness of breath productive cough,. 

Patient was treated for COPD

exacerbation and pneumonia with antibiotics. 

Patient then underwent heart

catheterization at [facility] 

multivessel coronary artery disease.



The patient does admit to productive cough with 

yellow sputum. Patient denies

any history of PE and DVT. Denies any nausea 

vomiting diarrhea.



Patient was transferred to [facility] for 

further workup and evaluation for

coronary bypass graft surgery



Patient reports he continues to smoke 1 pack/day.

Patient denies any alcohol use.



Patient transferred to [facility] for COPD 

exasperation and workup for

multivessel coronary disease











History

Smoking status for patients 13 years old or 

older: Current every day smoker

Date last smoked: 25

Packs per day: 1

Years smoked: 67

Pack years: 67



Medication/Allergy-Vaccine Hx

Allergies:

Coded Allergies:

No Known Allergies (25)





Review of Systems



Free Text ROS Notes

Free Text ROS Notes:

Constitutional: Negative for fever, chills, 

weight loss

Skin: Negative for rash, negative for swelling, 

negative for any laceration

HEENT: Denies hearing loss, denies any ear 

ringing, denies any earache, denies

any sore throat

Respiratory: Dyspnea on exertion

Cardiac: Denies chest pain, denies palpitations, 

denies orthopnea

GI: Denies constipation, denies diarrhea

: Denies hematuria, denies dysuria, denies 

flank pain

Musculoskeletal: Denies any joint pain, denies 

any joint swelling, denies any

myalgia

Hematologic: Denies any easy bruising, denies any 

bleeding

Endocrine: denies any night sweats, denies 

polyuria OR polydipsia

Neurologic: Denies any lightheaded, denies any 

headache, denies any confusion,

denies any dizziness







Physical Exam

VS/I O

Vital Signs:

Date Time Temp Pulse Resp B/P B/P Pulse O2 O2 

Flow FiO2

Mean Ox Delivery Rate

 0600 104 25 142/74 101 99

 0500 90 22 143/73 102 98

 0403 99 23 149/70 101 100

 0400 98.4

 0400 High flow 6

nasal

cannula

 0350 86 98 40

 0306 114 30 163/78 112 97

 0200 94 97/53  67 97

 0101 101 37 144/63 90 99

 0012 103 98 40

 0000 98.2

 0000  BiPAP

 0000 97 20 124/59 84 98

 2300 91 15 117/58 83 98

 2200 93 20 111/59  82 98

 2100 96 25 106/54 75 98

2045 102 100 40

2024  96 High flow 6

nasal

cannula

2000 98.4  High flow 6

nasal

cannula

2000  High flow 6

nasal

cannula

2000 111 30 144/65 94 99

 1900 115 39 141/70 96 96

 1801 107 23 148/68 98 97

 1748 98  Nasal 5

cannula

 1629 98.6

 1629 High flow 4

nasal

cannula



24 hour I O ending at 0700:

 0700  1900

Intake Total 600.00

Output Total 775

Balance -175.00



Intake, .00

Intake, Oral 300

Output, Urine 775

Patient 64.9 kg 67 kg

Weight

Weight Bed scale Not applicable

Measurement

Method



PATIENT WEIGHT:



Weight (lb): 143

Weight (oz): 1.28

Weight (kg): 64.900



General: well nourished, well groomed, no acute 

distress.

HEENT: conjunctiva clear, extraocular movement 

intact, PERRLA,

Neck: no, JVD, trachea midline, no, 

lymphadenopathy, neck supple, normal ROM.

Respiratory: Decreased breath sounds, crackles

Cardiovascular: Tachycardia

Abdomen: Soft, non tender. No rebound. No 

guarding

Extremities: dry, moves all.

Musculoskeletal: Full range of motion, no CVA 

tenderness, no muscle spasm

Skin: warm, dry, no, lesions, rash.

Neurologic: Alert and oriented x3.

Psychiatric: affect and demeanor normal







Diagnosis, Assessment Plan



Free Text DxA P Notes

Free Text DxA P Notes:

This is a 69-year-old gentleman with a past 

medical history of COPD, on home

oxygen, 4 L who presented to the hospital at [facility] for complaints of

worsening shortness of breath productive cough,. 

Patient was treated for COPD

exacerbation and pneumonia with antibiotics. 

Patient then underwent heart

catheterization at [facility] 

multivessel coronary artery disease.



The patient does admit to productive cough with 

yellow sputum. Patient denies

any history of PE and DVT. Denies any nausea 

vomiting diarrhea.



Patient was transferred to [facility] for 

further workup and evaluation for

coronary bypass graft surgery



Patient reports he continues to smoke 1 pack/day.

Patient denies any alcohol use.



Patient transferred to [facility] for COPD 

exasperation and workup for

multivessel coronary disease



Assessment/plan

1. Coronary disease

2. COPD exacerbation

With recent history of pneumonia

3. Current everyday smoker





Patient will be admitted. Pulmonary consulted for 

COPD. Will order CT of the

chest. Echocardiogram ordered. Consult 

cardiology. Workup underway for

Multivessel CAD.

Further recommendations to follow.





Bong Corey 25 1303:

Attestations



Physician Attestation

Agree w/findings plan:

I have seen and examined Mr. Covington. I agree 

with the findings and plan as

documented by SARA Patel.



Briefly, 69-year-old male with severe coronary 

artery disease and COPD. Patient

will benefit from intervention on the coronary 

arteries. He will be worked up

and presented in the high risk cardiac surgery 

conference. I have explained the

plan to the patient.



Electronically Signed by Tanisha Flores 

on 25 at 0756

Electronically Signed by Bong Corey MD on 

25 at 3300



RPT #:[id number]

***END OF REPORT***                                 [facility]

## 2025-03-25 NOTE — RAD REPORT
EXAMINATION: ONE VIEW CHEST XR



CLINICAL INDICATION: Male, 69 years old.,DYSPNEA



TECHNIQUE: Frontal chest projection is submitted. Examination is limited by patient positioning and t
echnique.



COMPARISON: 2/24/2025



FINDINGS:

The lungs are diffusely emphysematous but grossly clear.  No pneumothorax. Bilateral hemidiaphragmati
c flattening versus small pleural effusions, stable.. The heart is normal in size. Mediastinal

contours are unremarkable.



IMPRESSION: 



No acute intrathoracic abnormalities. Stable findings as above.







Reported By: Camacho Avelar 

Electronically Signed:  3/25/2025 2:40 PM

## 2025-03-25 NOTE — EDPHYS
Physician Documentation                                                                           

 [facility]                                                                 

Name: Epifanio Quinones                                                                            

Age: 69 yrs                                                                                       

Sex: Male                                                                                         

: 1955                                                                                   

MRN: [id number]                                                                                   

Arrival Date: 2025                                                                          

Time: 12:07                                                                                       

Account#: [id number]                                                                            

Bed 7                                                                                             

Private MD:                                                                                       

ED Physician Carl Perry                                                                        

HPI:                                                                                              

                                                                                             

12:21 This 69 yrs old  Male presents to ER via EMS with complaints of Breathing      ec2 

      Difficulty.                                                                                 

12:21 Patient with recent stenting arrives today for progressive shortness of breath. No      ec2 

      history of heart failure, EMS was called, they report that they had noted hypoxia with      

      saturations in the upper 80s. Patient is not on a diuretic. Reports dyspnea on              

      exertion..                                                                                  

                                                                                                  

Historical:                                                                                       

- Allergies:                                                                                      

12:12 No Known Allergies;                                                                     kc6 

- PMHx:                                                                                           

12:12 Hypertension; COPD; Myocardial infarction;                                              kc6 

- PSHx:                                                                                           

12:12 Stented artery;                                                                         kc6 

                                                                                                  

- Immunization history:: Adult Immunizations not up to date.                                      

- Infectious Disease History:: Denies.                                                            

- Social history:: Smoking status: Patient reports the use of cigarette tobacco                   

  products, smokes one pack cigarettes per day.                                                   

                                                                                                  

                                                                                                  

ROS:                                                                                              

12:22 Constitutional: as per hpi                                                              ec2 

                                                                                                  

Exam:                                                                                             

12:22 Constitutional:  GEN: NAD                                                                   

Head: atraumatic                                                                                  

Eyes: EOMI                                                                                        

Ears: External ears are          ec2                                                              

      normal.                                                                                     

CV tachycardia, trace lower extremity edema                                                       

LUNGS: Tachypnea, scattered wheezes                                                               

      appreciated.                                                                                

ABD: non-distended                                                                                

SKIN: no evidence of rashes                                                                       

MSK: no evidence of trauma                                                                        

                                                                                                  

Vital Signs:                                                                                      

12:10  / 73; Pulse 111; Resp 23 S; Temp 98.8(O); Pulse Ox 95% on 4 lpm NC; Weight 66.68 kc6 

      kg (R); Height 5 ft. 8 in. (R); Pain 0/10;                                                  

14:01  / 67; Pulse 95; Resp 31 S; Pulse Ox 99% on 4 lpm NC;                             kc6 

18:39  / 78; Pulse 99; Resp 24; Pulse Ox 95% on 4 lpm NC;                               cm10

12:10 Body Mass Index 22.35 (66.68 kg, 172.72 cm)                                             kc6 

12:10 Pain Scale: Adult                                                                       kc6 

                                                                                                  

MDM:                                                                                              

12:18 Medical Screening Exam initiated                                                        ec2 

12:22 Data reviewed: vital signs, nurses notes. ED course: Patient arrives today for          ec2 

      progressive shortness of breath in setting of previous cardiac procedure. Examination       

      yields cardiopulmonary findings as above. EKG obtained, independently reviewed and          

      interpreted by me, shows sinus tachycardia, rate 107, no acute ST segment elevations,       

      normals are nonactionable. Will obtain a cardiac workup. Suspect new onset heart            

      failure..                                                                                   

13:49 ED course: BNP elevated at 570, CBC, metabolic profile are otherwise nonactionable.     ec2 

      Patient is clearly edematous, will give the patient Lasix as well as potassium              

      supplementation, will admit for diuresis..                                                  

13:53 ED course: Discussed the case with Dr. Colón as well as hospitalist will admit and    ec2 

      further manage..                                                                            

                                                                                                  

                                                                                             

12:14 Order name: Basic Metabolic Panel; Complete Time: 13:48                                 ec2 

                                                                                             

12:14 Order name: CBC with Diff; Complete Time: 13:05                                         ec2 

                                                                                             

12:14 Order name: NT PRO-BNP; Complete Time: 13:48                                            ec2 

                                                                                             

12:14 Order name: Troponin HS; Complete Time: 13:48                                           ec2 

                                                                                             

12:14 Order name: LFT's; Complete Time: 13:48                                                 ec2 

                                                                                             

14:24 Order name: Basic Metabolic Panel                                                       EDMS

                                                                                             

14:24 Order name: Basic Metabolic Panel                                                       EDMS

                                                                                             

14:24 Order name: Basic Metabolic Panel                                                       EDMS

                                                                                             

14:24 Order name: Basic Metabolic Panel                                                       EDMS

                                                                                             

14:24 Order name: CBC with Automated Diff                                                     EDMS

                                                                                             

14:24 Order name: CBC with Automated Diff                                                     EDMS

                                                                                             

14:24 Order name: CBC with Automated Diff                                                     EDMS

                                                                                             

14:24 Order name: CBC with Automated Diff                                                     EDMS

                                                                                             

14:24 Order name: Magnesium                                                                   EDMS

                                                                                             

14:24 Order name: Magnesium                                                                   EDMS

                                                                                             

14:24 Order name: Magnesium                                                                   EDMS

                                                                                             

14:24 Order name: Magnesium                                                                   EDMS

                                                                                             

14:24 Order name: T4 Free                                                                     EDMS

                                                                                             

14:24 Order name: T4 Free                                                                     EDMS

                                                                                             

14:24 Order name: Thyroid Stimulating Hormone                                                 EDMS

                                                                                             

14:24 Order name: Thyroid Stimulating Hormone                                                 EDMS

                                                                                             

14:24 Order name: Troponin High Sensitivity                                                   EDMS

                                                                                             

14:24 Order name: Troponin High Sensitivity                                                   EDMS

                                                                                             

14:24 Order name: Troponin High Sensitivity                                                   [facility]

                                                                                             

16:28 Order name: ABG Arterial Blood Gas                                                      EDMS

                                                                                             

12:14 Order name: XRAY Chest (1 view); Complete Time: 14:51                                   ec2 

                                                                                             

14:24 Order name: Echo with Doppler                                                           EDMS

                                                                                             

12:14 Order name: Cardiac monitoring; Complete Time: 12:22                                    ec2 

                                                                                             

12:14 Order name: EKG - Nurse/Tech; Complete Time: 12:22                                      ec2 

                                                                                             

12:14 Order name: IV Saline Lock; Complete Time: 13:17                                        ec2 

                                                                                             

12:14 Order name: Labs collected and sent; Complete Time: 12:42                               ec2 

                                                                                             

12:14 Order name: O2 Per Protocol; Complete Time: 12:22                                       ec2 

                                                                                             

12:14 Order name: O2 Sat Monitoring; Complete Time: 12:22                                     ec2 

                                                                                             

13:01 Order name: Labs - recollect needed: recollect green top; Complete Time: 13:17          bd  

                                                                                                  

Administered Medications:                                                                         

14:22 Drug: Furosemide IVP 40 mg IVP once; give over 2 minutes Route: IVP; Site: right upper  kc6 

      arm;                                                                                        

15:51 Follow up: Response: No adverse reaction                                                kc6 

14:22 Drug: Potassium Chloride IV 20 mEq IV at calculated rate once; administer over 1-2      kc6 

      hours Route: IV; Rate: calculated rate; Site: right upper arm;                              

15:51 Follow up: Response: No adverse reaction; IV Status: Completed infusion; IV Intake:     kc6 

      100ml                                                                                       

14:22 Drug: Potassium Chloride PO 40 mEq PO once Route: PO;                                   kc6 

15:52 Follow up: Response: No adverse reaction                                                kc6 

14:22 Drug: DuoNeb Nebulize (2.5 mg - 0.5 mg) 3 ml Nebulizer once Route: Nebulizer;           kc6 

15:52 Follow up: Response: No adverse reaction                                                kc6 

14:22 Drug: Nicoderm CQ Transdermal Patch 21 mg/24 hr 21 mg Transdermal once Route:           kc6 

      Transdermal; Site: affected area;                                                           

15:51 Follow up: Response: No adverse reaction                                                kc6 

14:22 Drug: MethylPrednisoLONE  mg IVP once Route: IVP; Site: right upper arm;         kc6 

15:51 Follow up: Response: No adverse reaction                                                kc 

                                                                                                  

                                                                                                  

Disposition:                                                                                      

13:53 Critical Care:.                                                                         ec2 

                                                                                                  

Disposition Summary:                                                                              

25 13:53                                                                                    

Hospitalization Ordered                                                                           

 Notes:       Hospitalization Status: Inpatient Admission                                           
  ec2

      Provider: Ang Woods                                                                ec2 

      Condition: Stable                                                                       ec2 

      Problem: new                                                                            ec2 

      Symptoms: have improved                                                                 ec2 

      Bed/Room Type: Standard                                                                 ec2 

      Location: Telemetry/MedSurg (Inpatient)(25 17:33)                                 bd  

      Room Assignment: 409(25 17:33)                                                    bd  

      Diagnosis                                                                                   

        - Dyspnea, Hypoxia, Likely new CHF dx                                                 ec2 

      Forms:                                                                                      

        - Medication Reconciliation Form                                                      ec2 

        - SBAR form                                                                           ec2 

        - Leadership Thank You Letter                                                         ec2 

Critical care time excluding procedures:                                                          

13:53 Critical care time: Bedside Care: 30 minutes, Consultation: 5 minutes. Total time: 35   ec2 

      minutes                                                                                     

                                                                                                  

Signatures:                                                                                       

Dispatcher [employer]                           EDSuad Ross Irene, RN                     RN   iw                                                   

Lucio Arrington, FNP-C                      FNP-Cla1                                                  

Shannan Chase RN                   RN   kc6                                                  

Carl Perry MD MD   ec2                                                  

                                                                                                  

Corrections: (The following items were deleted from the chart)                                    

14:48 13:53 Telemetry/MedSurg (Inpatient) ec2                                                 bd  

14:48 13:53 ec2                                                                               bd  

16:21 14:48 [facility] ER HOLD bd                                                                   bd  

16:21 14:48 ERHOLD- bd                                                                        bd  

16:51 16:21 Telemetry/MedSurg (Inpatient) bd                                                  iw  

16:51 16:21 213 bd                                                                            iw  

17:33 16:51 [facility] ER HOLD iw                                                                   bd  

17:33 16:51 ERHOLD- iw                                                                        bd  

                                                                                                  

**************************************************************************************************

## 2025-03-25 NOTE — ER
Nurse's Notes                                                                                     

 [facility]                                                                 

Name: Epifanio Quinones                                                                            

Age: 69 yrs                                                                                       

Sex: Male                                                                                         

: 1955                                                                                   

MRN: [id number]                                                                                   

Arrival Date: 2025                                                                          

Time: 12:07                                                                                       

Account#: [id number]                                                                            

Bed 7                                                                                             

Private MD:                                                                                       

Diagnosis: Dyspnea, Hypoxia, Likely new CHF dx                                                    

                                                                                                  

Presentation:                                                                                     

                                                                                             

12:10 Chief complaint: EMS states: they were toned out for trouble breathing x2 days. pt is   kc6 

      s/p cardiac cath with 4 stents x 3 weeks ago. pt was 88% on RA for EMS. Coronavirus         

      screen: At this time, the client does not indicate any symptoms associated with             

      coronavirus-19. Ebola Screen: No symptoms or risks identified at this time. Initial         

      Sepsis Screen: Does the patient meet any 2 criteria? RR > 20 per min. HR > 90 bpm. Does     

      the patient have a suspected source of infection? No. Patient's initial sepsis screen       

      is negative. Risk Assessment: Do you want to hurt yourself or someone else? Patient         

      reports no desire to harm self or others. Onset of symptoms was 2025. Care        

      prior to arrival: Medication(s) given: Albuterol Neb x 1, Atrovent Neb x 1.                 

12:10 Method Of Arrival: EMS: [facility]                                                    kc6 

12:10 Acuity: AMBER 2                                                                           kc6 

                                                                                                  

Triage Assessment:                                                                                

18:40 Respiratory:                                                                            cm10

                                                                                                  

Historical:                                                                                       

- Allergies:                                                                                      

12:12 No Known Allergies;                                                                     kc6 

- PMHx:                                                                                           

12:12 Hypertension; COPD; Myocardial infarction;                                              kc6 

- PSHx:                                                                                           

12:12 Stented artery;                                                                         kc6 

                                                                                                  

- Immunization history:: Adult Immunizations not up to date.                                      

- Infectious Disease History:: Denies.                                                            

- Social history:: Smoking status: Patient reports the use of cigarette tobacco                   

  products, smokes one pack cigarettes per day.                                                   

                                                                                                  

                                                                                                  

Screenin:12 [facility] ED Fall Risk Assessment (Adult) History of falling in the last 3 months,       kc6 

      including since admission No falls in past 3 months (0 pts) Confusion or Disorientation     

      No (0 pts) Intoxicated or Sedated No (0 pts) Impaired Gait No (0 pts) Mobility Assist       

      Device Used No (0 pt) Altered Elimination No (0 pt) Score/Fall Risk Level 0 - 2 = Low       

      Risk Oriented to surroundings, Maintained a safe environment, Educated pt \T\ family on     

      fall prevention, incl call for assistance when getting out of bed. Abuse screen: Denies     

      threats or abuse. Denies injuries from another. Nutritional screening: No deficits          

      noted. Tuberculosis screening: No symptoms or risk factors identified.                      

                                                                                                  

Assessment:                                                                                       

12:42 General: Appears in no apparent distress. uncomfortable, well groomed, well developed,  kc6 

      Behavior is calm, cooperative, appropriate for age. Pain: Denies pain. Neuro: Level of      

      Consciousness is awake, alert, obeys commands, Oriented to person, place, time,             

      situation, Appropriate for age. Cardiovascular: Denies chest pain, Heart tones S1 S2        

      present Capillary refill < 3 seconds Rhythm is sinus tachycardia. Respiratory: Reports      

      shortness of breath at rest on exertion cough that is productive, Airway is patent          

      Trachea midline Respiratory effort is even, labored, pursed lip, using tripod position,     

      Respiratory pattern is symmetrical, tachypnea Breath sounds with crackles bilaterally.      

      Breath sounds with wheezes bilaterally. GI: No signs and/or symptoms were reported          

      involving the gastrointestinal system. : No signs and/or symptoms were reported           

      regarding the genitourinary system. EENT: No signs and/or symptoms were reported            

      regarding the EENT system. Derm: No signs and/or symptoms reported regarding the            

      dermatologic system. Skin is intact, is healthy with good turgor, Skin is pink, warm \T\    

      dry. Musculoskeletal: No signs and/or symptoms reported regarding the musculoskeletal       

      system. Circulation, motion, and sensation intact. Range of motion: intact in all           

      extremities.                                                                                

13:42 Reassessment: Patient appears in no apparent distress at this time. No changes from     kc6 

      previously documented assessment. Patient and/or family updated on plan of care and         

      expected duration. Pain level reassessed. Patient is alert, oriented x 3, equal             

      unlabored respirations, skin warm/dry/pink.                                                 

14:42 Reassessment: Patient appears in no apparent distress at this time. No changes from     kc6 

      previously documented assessment. Patient and/or family updated on plan of care and         

      expected duration. Pain level reassessed. Patient is alert, oriented x 3, equal             

      unlabored respirations, skin warm/dry/pink.                                                 

15:42 Reassessment: Patient appears in no apparent distress at this time. No changes from     kc6 

      previously documented assessment. Patient and/or family updated on plan of care and         

      expected duration. Pain level reassessed. Patient is alert, oriented x 3, equal             

      unlabored respirations, skin warm/dry/pink.                                                 

                                                                                                  

Vital Signs:                                                                                      

12:10  / 73; Pulse 111; Resp 23 S; Temp 98.8(O); Pulse Ox 95% on 4 lpm NC; Weight 66.68 kc6 

      kg (R); Height 5 ft. 8 in. (R); Pain 0/10;                                                  

14:01  / 67; Pulse 95; Resp 31 S; Pulse Ox 99% on 4 lpm NC;                             kc6 

18:39  / 78; Pulse 99; Resp 24; Pulse Ox 95% on 4 lpm NC;                               cm10

12:10 Body Mass Index 22.35 (66.68 kg, 172.72 cm)                                             kc6 

12:10 Pain Scale: Adult                                                                       [facility] 

                                                                                                  

ED Course:                                                                                        

12:10 Patient arrived in ED.                                                                  6 

12:12 Triage completed.                                                                       kc6 

12:12 Arm band placed on.                                                                     kc6 

12:12 Patient has correct armband on for positive identification. Placed in gown. Bed in low  kc6 

      position. Call light in reach. Side rails up X2. Cardiac monitor on. Pulse ox on. NIBP      

      on. Door closed. Noise minimized. Lights dimmed. Pillow given. Verbal reassurance given.    

12:12 Oxygen administration via nasal cannula \T\ 4L/min.                                       kc6 

12:13 Carl Perry MD is Attending Physician.                                               ec2 

12:19 Shannan Chase, RN is Primary Nurse.                                                 kc6 

12:42 Initial lab(s) drawn, by me, sent to lab. Missed attempt(s): 22 gauge in right hand.    kc6 

13:17 Accessed peripheral vein via ultrasound, utilizing dynamic ultrasound technique Blood   cm10

      collected. Clean \T\ dry. Dressing intact. Good blood return. Flushes easily. 20G RIGHT     

      UPPER ARM.                                                                                  

13:22 XRAY Chest (1 view) In Process Unspecified.                                             EDMS

13:53 Ang Woods MD is Hospitalizing Provider.                                           ec2 

14:00 No provider procedures requiring assistance completed. Patient admitted, IV remains in  kc6 

      place.                                                                                      

18:39 Provided Education on: Need for admit.                                                  cm10

                                                                                                  

Administered Medications:                                                                         

14:22 Drug: Furosemide IVP 40 mg IVP once; give over 2 minutes Route: IVP; Site: right upper  [facility] 

      arm;                                                                                        

15:51 Follow up: Response: No adverse reaction                                                kc6 

14:22 Drug: Potassium Chloride IV 20 mEq IV at calculated rate once; administer over 1-2      kc6 

      hours Route: IV; Rate: calculated rate; Site: right upper arm;                              

15:51 Follow up: Response: No adverse reaction; IV Status: Completed infusion; IV Intake:     kc6 

      100ml                                                                                       

14:22 Drug: Potassium Chloride PO 40 mEq PO once Route: PO;                                   kc6 

15:52 Follow up: Response: No adverse reaction                                                kc6 

14:22 Drug: DuoNeb Nebulize (2.5 mg - 0.5 mg) 3 ml Nebulizer once Route: Nebulizer;           kc6 

15:52 Follow up: Response: No adverse reaction                                                kc6 

14:22 Drug: Nicoderm CQ Transdermal Patch 21 mg/24 hr 21 mg Transdermal once Route:           kc6 

      Transdermal; Site: affected area;                                                           

15:51 Follow up: Response: No adverse reaction                                                kc6 

14:22 Drug: MethylPrednisoLONE  mg IVP once Route: IVP; Site: right upper arm;         kc6 

15:51 Follow up: Response: No adverse reaction                                                kc6 

                                                                                                  

                                                                                                  

Medication:                                                                                       

14:00 VIS not applicable for this client.                                                     kc6 

                                                                                                  

Intake:                                                                                           

15:51 IV: 100ml; Total: 100ml.                                                                kc6 

                                                                                                  

Outcome:                                                                                          

13:53 Decision to Hospitalize by Provider.                                                    ec2 

14:00 Admitted to ER Hold.  Please see [facility] for further documentation.                    kc6 

14:00 Condition: stable                                                                           

14:00 Instructed on the need for admit,                                                           

18:44 Patient left the ED.                                                                    kc6 

                                                                                                  

Signatures:                                                                                       

Dispatcher [employer]                           Shannan Conn RN RN   kc6                                                  

Cindy Bailey RN                  RN   cm10                                                 

Carl Perry MD MD   ec2                                                  

                                                                                                  

**************************************************************************************************

## 2025-03-26 LAB
ANION GAP SERPL CALC-SCNC: 7.7 MEQ/L (ref 5–15)
BLD SMEAR INTERP: (no result)
BUN BLD-MCNC: 28 MG/DL (ref 7–18)
GLUCOSE SERPLBLD-MCNC: 157 MG/DL (ref 74–106)
HCT VFR BLD CALC: 39.1 % (ref 39.6–49)
HGB BLD-MCNC: 13 G/DL (ref 13.6–17.9)
LYMPHOCYTES # SPEC AUTO: 0.6 K/UL (ref 0.7–4.9)
MAGNESIUM SERPL-MCNC: 2.7 MG/DL (ref 1.6–2.4)
MCH RBC QN AUTO: 30.9 PG (ref 27–35)
MCHC RBC AUTO-ENTMCNC: 33.2 G/DL (ref 32–36)
MCV RBC: 92.9 FL (ref 80–100)
MORPHOLOGY BLD-IMP: (no result)
NRBC # BLD: 0 10*3/UL (ref 0–0)
NRBC BLD AUTO-RTO: 0.1 % (ref 0–0)
PMV BLD: 7.5 FL (ref 7.6–11.3)
POTASSIUM SERPL-SCNC: 4.7 MEQ/L (ref 3.5–5.1)
RBC # BLD: 4.2 M/UL (ref 4.33–5.43)
TROPONIN I SERPL HS-MCNC: 14.2 PG/ML (ref ?–58.9)
TSH SERPL DL<=0.05 MIU/L-ACNC: 0.31 UIU/ML (ref 0.36–3.74)
WBC # BLD AUTO: 11.6 THOU/UL (ref 4.3–10.9)

## 2025-03-26 RX ADMIN — ARFORMOTEROL TARTRATE SCH MCG: 15 SOLUTION RESPIRATORY (INHALATION) at 09:00

## 2025-03-26 RX ADMIN — ENOXAPARIN SODIUM SCH MG: 40 INJECTION SUBCUTANEOUS at 09:34

## 2025-03-26 RX ADMIN — PNEUMOCOCCAL VACCINE POLYVALENT ONE ML
25; 25; 25; 25; 25; 25; 25; 25; 25; 25; 25; 25; 25; 25; 25; 25; 25; 25; 25; 25; 25; 25; 25 INJECTION, SOLUTION INTRAMUSCULAR; SUBCUTANEOUS at 09:34

## 2025-03-26 RX ADMIN — PRASUGREL SCH MG: 10 TABLET, FILM COATED ORAL at 13:35

## 2025-03-26 RX ADMIN — ASPIRIN SCH MG: 81 TABLET, COATED ORAL at 09:35

## 2025-03-26 RX ADMIN — BUDESONIDE SCH MG: 0.5 INHALANT ORAL at 20:38

## 2025-03-26 RX ADMIN — FLUTICASONE PROPIONATE SCH SPR: 50 SPRAY, METERED NASAL at 12:17

## 2025-03-26 RX ADMIN — METOPROLOL SUCCINATE SCH: 25 TABLET, EXTENDED RELEASE ORAL at 05:53

## 2025-03-26 RX ADMIN — VALSARTAN SCH: 160 TABLET ORAL at 09:00

## 2025-03-26 RX ADMIN — ROFLUMILAST SCH MCG: 500 TABLET ORAL at 09:35

## 2025-03-26 RX ADMIN — AZITHROMYCIN SCH MG: 250 TABLET, FILM COATED ORAL at 09:35

## 2025-03-26 NOTE — P.CNS
Date of Consult: 03/26/25


Chief Complaint: COPD exacerbation, new onset CHF


History of Present Illness: 





Patient with PMH of CAD recent PCI in [facility], presented with 

worsening SOB, CARRERO, patient also has history of COPD on home oxygen, denies 

having chest pain, no palpitations, no syncope.


Allergies





No Known Drug Allergies Allergy (Verified 03/25/25 19:33)


   Unknown





Home medications list reviewed: Yes


Home Medications: 








Fluticasone/Umeclidin/Vilanter [Trelegy Ellipta 100-62.5-25] 1 each IH DAILY 

02/24/25 


Amlodipine [Norvasc] 5 mg PO DAILY 03/26/25 


Aspirin 81 mg PO DAILY 03/26/25 


Atorvastatin Calcium 40 mg PO BEDTIME 03/26/25 


Metoprolol Succinate [Toprol Xl] 50 mg PO DAILY 03/26/25 


Valsartan [Diovan] 160 mg PO DAILY 03/26/25 


prasugrel HCL [Prasugrel HCl] 1 tab PO DAILY 03/26/25 








- Past Medical/Surgical History


Diabetic: No


-: COPD


-: tobacco abuse


-: HTN


-: dyslipidemia


-: TUBERCULOSIS IN 1980'S


-: CAD


-: cardiac stents x 4


Psychosocial/ Personal History: Lives at home with family





- Family History


  ** Mother


Medical History: Heart disease


Notes: had pacemaker





- Social History


Smoking Status: Current every day smoker


Alcohol use: No


CD- Drugs: No


Caffeine use: Yes


Place of Residence: Home





Review of Systems


10-point ROS is otherwise unremarkable





Physical Examination














Temp Pulse Resp BP Pulse Ox


 


 97 F   91 H  15   103/64   93 


 


 03/26/25 08:00  03/26/25 08:54  03/26/25 08:00  03/26/25 08:00  03/26/25 08:54








General: Alert, In no apparent distress


HEENT: Atraumatic, PERRLA, Mucous membr. moist/pink, EOMI, Sclerae nonicteric


Neck: Supple, 2+ carotid pulse no bruit, No LAD, Without JVD or thyroid abnorm

ality


Respiratory: Clear to auscultation bilaterally, Normal air movement


Cardiovascular: Regular rate/rhythm, Normal S1 S2


Gastrointestinal: Normal bowel sounds, No tenderness


Musculoskeletal: No tenderness


Integumentary: No rashes


Neurological: Normal gait, Normal speech, Normal tone, Normal affect


Lymphatics: No axilla or inguinal lymphadenopathy


Laboratory Data (last 24 hrs)











  03/25/25 03/25/25





  13:15 12:40


 


WBC   11.70 H


 


Hgb   13.8


 


Hct   41.2


 


Plt Count   242


 


Sodium  134 L 


 


Potassium  3.9 


 


BUN  12 


 


Creatinine  0.86 


 


Glucose  107 H 


 


Total Bilirubin  0.6 


 


AST  20 


 


ALT  44 


 


Alkaline Phosphatase  84 











- Problems


(1) Acute on chronic diastolic heart failure


Current Visit: Yes   Status: Acute   


Plan: 


Lasix 40 mg IV BID


monitor input and output and electrolytes











(2) CAD (coronary artery disease)


Current Visit: Yes   Status: Acute   


Plan: 


continue ASA and Effient and statin


get echo

## 2025-03-26 NOTE — P.PN
Date of Service: 03/26/25





Subjective:


Breathing improved some overnight


No other acute events overnight





ROS: 


10 point ROS as noted above, otherwise negative








Physical exam


GEN: Alert, oriented, NAD


HEENT: Normal conjunctiva, sclera anicteric


CV: Regular rate and rhythm, 3+ pitting edema bilateral lower extremities


Pulm: Nonlabored respirations on nasal cannula oxygen


ABD: Soft, nontender, nondistended


MSK: No joint tenderness


Integumentary: No rashes


Neuro: Normal speech, normal affect





Vitals reviewed





Assessment:


Acute on chronic hypoxic respiratory failure


COPD with exacerbation-on chronic home O2


Suspected new onset CHFbilateral lower extremity pitting edema


CAD with recent stents x 4


Hypertension


Hyperlipidemia


Mild hyponatremia








Plan:


Acute on chronic hypoxic respiratory failure


COPD with exacerbation-on chronic home O2


Suspected new onset CHFbilateral lower extremity pitting edema


CAD with recent stents x 4


Cardiology and pulmonology consulted


Continue diuresis with IV Lasix


Continue steroids, as needed nebulizer treatments


BiPAP as needed


ABG without respiratory acidosis


Effient, aspirin, statin continued


echocardiogram ordered


Sputum culture added


Abx added by pulm





Hypertension


Hyperlipidemia


Continue home medications





Mild hyponatremia


Continue diuresis for suspected CHF exacerbation





DVT PPX: Lovenox


Code status: Full


Discharge Plan: Home


Plan to discharge in: Greater than 2 days








Time Spent Managing Pts Care (In Minutes): 35

## 2025-03-26 NOTE — P.CNS
Date of Consult: 03/26/25


Reason for Consult: COPD exacerbation


Chief Complaint: COPD exacerbation


History of Present Illness: 





Patient is 69 years of age with a history of COPD was just recently discharged 

from [facility] had some stents put in apparently had some pneumonia was discharged ho

me only 4 days became worsening short of breath some productive white phlegm and

it appeared in the emergency room BiPAP when I saw him


Allergies





No Known Drug Allergies Allergy (Verified 03/25/25 19:33)


   Unknown





Home Medications: 








Fluticasone/Umeclidin/Vilanter [Trelegy Ellipta 100-62.5-25] 1 each IH DAILY 

02/24/25 


Amlodipine [Norvasc] 5 mg PO DAILY 03/26/25 


Aspirin 81 mg PO DAILY 03/26/25 


Atorvastatin Calcium 40 mg PO BEDTIME 03/26/25 


Metoprolol Succinate [Toprol Xl] 50 mg PO DAILY 03/26/25 


Valsartan [Diovan] 160 mg PO DAILY 03/26/25 


prasugrel HCL [Prasugrel HCl] 1 tab PO DAILY 03/26/25 








- Past Medical/Surgical History


Diabetic: No


-: COPD


-: tobacco abuse


-: HTN


-: dyslipidemia


-: TUBERCULOSIS IN 1980'S


-: CAD


-: cardiac stents x 4


Psychosocial/ Personal History: Lives at home with family





- Family History


  ** Mother


Medical History: Heart disease


Notes: had pacemaker





- Social History


Smoking Status: Current every day smoker


Alcohol use: No


CD- Drugs: No


Caffeine use: Yes


Place of Residence: Home





Review of Systems


10-point ROS is otherwise unremarkable


General: Weakness


Respiratory: Shortness of Breath





Physical Examination














Temp Pulse Resp BP Pulse Ox


 


 98.5 F   99 H  15   111/64   98 


 


 03/26/25 12:00  03/26/25 12:00  03/26/25 12:00  03/26/25 12:00  03/26/25 12:00








General: Alert, In no apparent distress, Moderate distress


Respiratory: Diminished, Expiratory wheezes


Cardiovascular: No edema, Regular rate/rhythm, Normal S1 S2


Gastrointestinal: Normal bowel sounds, Soft and benign, Non-distended


Laboratory Data (last 24 hrs)











  03/25/25 03/25/25





  13:15 12:40


 


WBC   11.70 H


 


Hgb   13.8


 


Hct   41.2


 


Plt Count   242


 


Sodium  134 L 


 


Potassium  3.9 


 


BUN  12 


 


Creatinine  0.86 


 


Glucose  107 H 


 


Total Bilirubin  0.6 


 


AST  20 


 


ALT  44 


 


Alkaline Phosphatase  84 











- Problems


(1) COPD exacerbation


Current Visit: Yes   Status: Acute   


Plan: 


Patient is 69 years of age admitted with recurrent COPD exacerbation recently 

had some stents put in x-ray shows some interstitial changes COPD labs 

chemistries reviewed white count is mildly elevated changed to p.o. prednisone 

add Zithromax Daliresp DC Lasix trial on nasal cannula oxygen I do not think he 

needs BiPAP also added long-acting bronchodilators

## 2025-03-27 LAB
ANION GAP SERPL CALC-SCNC: 7.3 MEQ/L (ref 5–15)
BUN BLD-MCNC: 33 MG/DL (ref 7–18)
GLUCOSE SERPLBLD-MCNC: 168 MG/DL (ref 74–106)
HCT VFR BLD CALC: 34.5 % (ref 39.6–49)
HGB BLD-MCNC: 11.7 G/DL (ref 13.6–17.9)
LYMPHOCYTES # SPEC AUTO: 0.5 K/UL (ref 0.7–4.9)
MAGNESIUM SERPL-MCNC: 2.5 MG/DL (ref 1.6–2.4)
MCH RBC QN AUTO: 30.8 PG (ref 27–35)
MCHC RBC AUTO-ENTMCNC: 33.8 G/DL (ref 32–36)
MCV RBC: 91.1 FL (ref 80–100)
NRBC # BLD: 0 10*3/UL (ref 0–0)
NRBC BLD AUTO-RTO: 0.1 % (ref 0–0)
PMV BLD: 8 FL (ref 7.6–11.3)
POTASSIUM SERPL-SCNC: 4.3 MEQ/L (ref 3.5–5.1)
RBC # BLD: 3.79 M/UL (ref 4.33–5.43)
WBC # BLD AUTO: 11.2 THOU/UL (ref 4.3–10.9)

## 2025-03-27 RX ADMIN — IPRATROPIUM BROMIDE SCH MG: 0.5 SOLUTION RESPIRATORY (INHALATION) at 00:12

## 2025-03-27 RX ADMIN — ALBUTEROL SULFATE SCH MG: 2.5 SOLUTION RESPIRATORY (INHALATION) at 00:12

## 2025-03-27 RX ADMIN — ALBUMIN (HUMAN) SCH MG: 5 SOLUTION INTRAVENOUS at 10:55

## 2025-03-27 NOTE — EKG
Test Date:    2025-03-25               Test Time:    12:18:12

Technician:   SERAFIN                                    

                                                     

MEASUREMENT RESULTS:                                       

Intervals:                                           

Rate:         107                                    

VA:           124                                    

QRSD:         80                                     

QT:           290                                    

QTc:          387                                    

Axis:                                                

P:            86                                     

VA:           124                                    

QRS:          77                                     

T:            69                                     

                                                     

INTERPRETIVE STATEMENTS:                                       

                                                     

Sinus tachycardia with premature atrial complexes

Biatrial enlargement

Nonspecific T wave abnormality

Abnormal ECG

Compared to ECG 03/25/2025 12:16:58

Atrial premature complex(es) now present

Sinus rhythm no longer present

T-wave abnormality still present



Electronically Signed On 03-27-25 08:38:27 CDT by Luis Ulloa

## 2025-03-27 NOTE — P.PN
Date of Service: 03/27/25





Subjective:


Breathing improved some overnight


No other acute events overnight





ROS: 


10 point ROS as noted above, otherwise negative








Physical exam


GEN: Alert, oriented, NAD


HEENT: Normal conjunctiva, sclera anicteric


CV: Regular rate and rhythm, 2+ pitting edema bilateral lower extremities


Pulm: Nonlabored respirations on nasal cannula oxygen


ABD: Soft, nontender, nondistended


MSK: No joint tenderness


Integumentary: No rashes


Neuro: Normal speech, normal affect





Vitals reviewed





Assessment:


Acute on chronic hypoxic respiratory failure


COPD with exacerbation-on chronic home O2


new onset diastolic CHFbilateral lower extremity pitting edema


CAD with recent stents x 4


Hypertension


Hyperlipidemia


Mild hyponatremia








Plan:


Acute on chronic hypoxic respiratory failure


COPD with exacerbation-on chronic home O2


new onset diastolic CHFbilateral lower extremity pitting edema


CAD with recent stents x 4


Cardiology and pulmonology consulted


Continue diuresis with IV Lasix


Continue steroids, as needed nebulizer treatments


Effient, aspirin, statin continued


echocardiogram shows normal EF, diastolic dysfunction


Sputum culture added


Abx added by pulm





Hypertension


Hyperlipidemia


Continue home medications





Mild hyponatremia


Continue diuresis for CHF exacerbation





DVT PPX: Lovenox


Code status: Full


Discharge Plan: Home


Plan to discharge in: Greater than 2 days








Time Spent Managing Pts Care (In Minutes): 35

## 2025-03-27 NOTE — EKG
Test Date:    2025-03-25               Test Time:    12:16:58

Technician:   SERAFIN                                    

                                                     

MEASUREMENT RESULTS:                                       

Intervals:                                           

Rate:         105                                    

MO:           120                                    

QRSD:         82                                     

QT:           292                                    

QTc:          385                                    

Axis:                                                

P:            85                                     

MO:           120                                    

QRS:          77                                     

T:            64                                     

                                                     

INTERPRETIVE STATEMENTS:                                       

                                                     

** * Pediatric ECG analysis * **

Normal sinus rhythm

Right atrial enlargement

Possible Left ventricular hypertrophy

Nonspecific T wave abnormality

Compared to ECG 02/24/2025 13:51:21

T-wave abnormality now present

Sinus tachycardia no longer present

Myocardial infarct finding no longer present



Electronically Signed On 03-27-25 08:38:31 CDT by Luis Ulloa

## 2025-03-27 NOTE — ECHO
HEIGHT: 5 ft 8 in   WEIGHT: 151 lb 3.2 oz   DATE OF STUDY: 03/26/2025   REFER DR: Lucio Arrington NP

2-DIMENSIONAL: YES

     M.MODE: YES

 DOPPLER: YES

COLOR FLOW: YES



                    TDS:  

PORTABLE: YES

 DEFINITY:  

BUBBLE STUDY: 





DIAGNOSIS:  NEW CONGESTIVE HEART FAILURE



CARDIAC HISTORY:  

CATHERIZATION: 

SURGERY: 

PROSTHETIC VALVE: 

PACEMAKER: 





MEASUREMENTS (cm)

    DIASTOLIC (NORMALS)                 SYSTOLIC (NORMALS)

IVSd                 1.1 (0.6-1.2)                    LA Diam 2.3 (1.9-4.0)     LVEF       
  60-65%  

LVIDd               3.2 (3.5-5.7)                        LVIDs      2.2 (2.0-3.5)     %FS  
        33%

LVPWd             1.4 (0.6-1.2)

Ao Diam           3.0 (2.0-3.7)



2 DIMENSIONAL ASSESSMENT:

RIGHT ATRIUM:                   NORMAL

LEFT ATRIUM:       NORMAL



RIGHT VENTRICLE:            NORMAL

LEFT VENTRICLE: NORMAL



TRICUSPID VALVE:             NORMAL

MITRAL VALVE:     NORMAL



PULMONIC VALVE:             NORMAL

AORTIC VALVE:     NORMAL



PERICARDIAL EFFUSION: NONE

AORTIC ROOT:      NORMAL





LEFT VENTRICULAR WALL MOTION:     NORMAL



DOPPLER/COLOR FLOW:     GRADE II DIASTOLIC DYSFUNCTION



COMMENTS:      

  1. NORMAL LEFT VENTRICULAR SYSTOLIC FUNCTION, EJECTION FRACTION 60-65%, 

NORMAL WALL MOTION

  2. GRADE II DIASTOLIC DYSFUNCTION

  3. MILD ELEVATED FILLING PRESSURE (RIGHT ATRIAL PRESSURE 5-10 mmHg)



TECHNOLOGIST:   CHANDRAKANT DEE

## 2025-03-27 NOTE — P.PN
Subjective


Date of Service: 03/27/25


Chief Complaint: COPD exacerbation


Subjective: No new changes, No C/O voiced, Tolerating diet, Ambulating, 

Improving





Review of Systems


10-point ROS is otherwise unremarkable





Physical Examination





- Vital Signs


Temperature: 97.9 F


Blood Pressure: 119/70


Pulse: 85


Respirations: 16


Pulse Ox (%): 100





- Physical Exam


General: Alert, In no apparent distress


HEENT: Atraumatic, PERRLA, EOMI


Neck: Supple, JVD not distended


Respiratory: Clear to auscultation bilaterally, Normal air movement


Cardiovascular: Regular rate/rhythm, Normal S1 S2


Gastrointestinal: Normal bowel sounds, No tenderness


Musculoskeletal: No tenderness


Integumentary: No rashes


Neurological: Normal speech, Normal tone, Normal affect


Lymphatics: No axilla or inguinal lymphadenopathy





- Studies


Medications List Reviewed: Yes





Assessment And Plan





- Current Problems (Diagnosis)


(1) Acute on chronic diastolic heart failure


Current Visit: Yes   Status: Acute   


Plan: 


Lasix 40 mg IV BID


monitor input and output and electrolytes











(2) CAD (coronary artery disease)


Current Visit: Yes   Status: Acute   


Plan: 


continue ASA and Effient and statin


Echo shows normal LV systolic function, no wall motion abnormalities.

## 2025-03-28 LAB
ANION GAP SERPL CALC-SCNC: 6.3 MEQ/L (ref 5–15)
BUN BLD-MCNC: 26 MG/DL (ref 7–18)
GLUCOSE SERPLBLD-MCNC: 166 MG/DL (ref 74–106)
HCT VFR BLD CALC: 37.9 % (ref 39.6–49)
HGB BLD-MCNC: 12.8 G/DL (ref 13.6–17.9)
LYMPHOCYTES # SPEC AUTO: 0.7 K/UL (ref 0.7–4.9)
MAGNESIUM SERPL-MCNC: 2.4 MG/DL (ref 1.6–2.4)
MCH RBC QN AUTO: 31.2 PG (ref 27–35)
MCHC RBC AUTO-ENTMCNC: 33.9 G/DL (ref 32–36)
MCV RBC: 92.2 FL (ref 80–100)
NRBC # BLD: 0 10*3/UL (ref 0–0)
NRBC BLD AUTO-RTO: 0.1 % (ref 0–0)
PMV BLD: 8.2 FL (ref 7.6–11.3)
POTASSIUM SERPL-SCNC: 4.3 MEQ/L (ref 3.5–5.1)
RBC # BLD: 4.11 M/UL (ref 4.33–5.43)
WBC # BLD AUTO: 9.2 THOU/UL (ref 4.3–10.9)

## 2025-03-28 RX ADMIN — ALBUTEROL SULFATE PRN PUFF: 90 AEROSOL, METERED RESPIRATORY (INHALATION) at 16:11

## 2025-03-28 NOTE — P.PN
Date of Service: 03/28/25





Subjective:


Breathing improved some overnight


No other acute events overnight


Feeling better today





ROS: 


10 point ROS as noted above, otherwise negative








Physical exam


GEN: Alert, oriented, NAD


HEENT: Normal conjunctiva, sclera anicteric


CV: Regular rate and rhythm, 2+ pitting edema bilateral lower extremities


Pulm: Nonlabored respirations on nasal cannula oxygen


ABD: Soft, nontender, nondistended


MSK: No joint tenderness


Integumentary: No rashes


Neuro: Normal speech, normal affect





Vitals reviewed





Assessment:


Acute on chronic hypoxic respiratory failure


COPD with exacerbation-on chronic home O2


new onset diastolic CHFbilateral lower extremity pitting edema


CAD with recent stents x 4


Hypertension


Hyperlipidemia


Mild hyponatremia








Plan:


Acute on chronic hypoxic respiratory failure


COPD with exacerbation-on chronic home O2


new onset diastolic CHFbilateral lower extremity pitting edema


CAD with recent stents x 4


Cardiology and pulmonology consulted


Continue diuresis with IV Lasix


Continue steroids, as needed nebulizer treatments


Effient, aspirin, statin continued


echocardiogram shows normal EF, diastolic dysfunction


Sputum culture added


Abx added by pulm


Setting up home 02


DC in 1-2 days





Hypertension


Hyperlipidemia


Continue home medications





Mild hyponatremia


Continue diuresis for CHF exacerbation





DVT PPX: Lovenox


Code status: Full


Discharge Plan: Home


Plan to discharge in: Greater than 2 days








Time Spent Managing Pts Care (In Minutes): 35

## 2025-03-29 LAB
ANION GAP SERPL CALC-SCNC: 10.2 MEQ/L (ref 5–15)
BUN BLD-MCNC: 23 MG/DL (ref 7–18)
GLUCOSE SERPLBLD-MCNC: 128 MG/DL (ref 74–106)
HCT VFR BLD CALC: 39.2 % (ref 39.6–49)
HGB BLD-MCNC: 13.2 G/DL (ref 13.6–17.9)
MCH RBC QN AUTO: 31 PG (ref 27–35)
MCHC RBC AUTO-ENTMCNC: 33.6 G/DL (ref 32–36)
MCV RBC: 92.1 FL (ref 80–100)
PMV BLD: 8.2 FL (ref 7.6–11.3)
POTASSIUM SERPL-SCNC: 4.2 MEQ/L (ref 3.5–5.1)
RBC # BLD: 4.25 M/UL (ref 4.33–5.43)
WBC # BLD AUTO: 13.5 THOU/UL (ref 4.3–10.9)

## 2025-03-29 RX ADMIN — IPRATROPIUM BROMIDE SCH MG: 0.5 SOLUTION RESPIRATORY (INHALATION) at 14:35

## 2025-03-29 RX ADMIN — ALBUTEROL SULFATE SCH MG: 2.5 SOLUTION RESPIRATORY (INHALATION) at 14:35

## 2025-03-29 NOTE — P.PN
Subjective


Date of Service: 03/29/25


Chief Complaint: COPD exacerbation





Patient's condition is still using BiPAP on an intermittent basis still wheezing





Review of Systems


General: Weakness


Respiratory: Shortness of Breath





Physical Examination





- Vital Signs


Temperature: 97.7 F


Blood Pressure: 104/57


Pulse: 89


Respirations: 16


Pulse Ox (%): 97





- Physical Exam


General: Alert, Oriented x3


Respiratory: Expiratory wheezes


Cardiovascular: No edema, Regular rate/rhythm, Normal S1 S2





- Studies


Medications List Reviewed: Yes





Assessment And Plan





- Current Problems (Diagnosis)


(1) COPD exacerbation


Current Visit: Yes   Status: Acute   


Plan: 


Patient is 69 years of age admitted with COPD exacerbation requiring 

intermittent BiPAP with present therapy use scheduled bronchodilators every 6 

hours was no change in his present medication avoid Lasix as diastolic 

dysfunction

## 2025-03-29 NOTE — P.PN
Date of Service: 03/29/25





Subjective:


Lower extremity edema improved


Still with significant dyspnea/expiratory wheezing


No other acute events overnight





ROS: 


10 point ROS as noted above, otherwise negative








Physical exam


GEN: Alert, oriented, NAD


HEENT: Normal conjunctiva, sclera anicteric


CV: Regular rate and rhythm, 1+ pitting edema bilateral lower extremities


Pulm: Nonlabored respirations on nasal cannula oxygen


ABD: Soft, nontender, nondistended


MSK: No joint tenderness


Integumentary: No rashes


Neuro: Normal speech, normal affect





Vitals reviewed





Assessment:


Acute on chronic hypoxic respiratory failure


COPD with exacerbation-on chronic home O2


new onset diastolic CHFbilateral lower extremity pitting edema


CAD with recent stents x 4


Hypertension


Hyperlipidemia


Mild hyponatremia








Plan:


Acute on chronic hypoxic respiratory failure


COPD with exacerbation-on chronic home O2


new onset diastolic CHFbilateral lower extremity pitting edema


CAD with recent stents x 4


Cardiology and pulmonology consulted


Lower extremity edema improved, started spironolactone


Continue steroids, as needed nebulizer treatments


Effient, aspirin, statin continued


echocardiogram shows normal EF, diastolic dysfunction


Sputum culture added-being


Continue Abx added by pulm


Setting up home 02


Still significant dyspnea/expiratory wheezing





Hypertension


Hyperlipidemia


Continue home medications





Mild hyponatremia


Monitor chemistry daily





DVT PPX: Lovenox


Code status: Full


Discharge Plan: Home


Plan to discharge in: Greater than 2 days








Time Spent Managing Pts Care (In Minutes): 35

## 2025-03-30 LAB
ANION GAP SERPL CALC-SCNC: 7.4 MEQ/L (ref 5–15)
BUN BLD-MCNC: 22 MG/DL (ref 7–18)
GLUCOSE SERPLBLD-MCNC: 159 MG/DL (ref 74–106)
HCT VFR BLD CALC: 39.6 % (ref 39.6–49)
HGB BLD-MCNC: 13.3 G/DL (ref 13.6–17.9)
MAGNESIUM SERPL-MCNC: 2.4 MG/DL (ref 1.6–2.4)
MCH RBC QN AUTO: 31.3 PG (ref 27–35)
MCHC RBC AUTO-ENTMCNC: 33.5 G/DL (ref 32–36)
MCV RBC: 93.4 FL (ref 80–100)
PMV BLD: 7.6 FL (ref 7.6–11.3)
POTASSIUM SERPL-SCNC: 4.4 MEQ/L (ref 3.5–5.1)
RBC # BLD: 4.24 M/UL (ref 4.33–5.43)
WBC # BLD AUTO: 10 THOU/UL (ref 4.3–10.9)

## 2025-03-30 RX ADMIN — SPIRONOLACTONE SCH MG: 25 TABLET, FILM COATED ORAL at 08:23

## 2025-03-30 NOTE — P.PN
Date of Service: 03/30/25





Subjective:


Lower extremity edema improved


Still with significant dyspnea/expiratory wheezing


No other acute events overnight





ROS: 


10 point ROS as noted above, otherwise negative








Physical exam


GEN: Alert, oriented, NAD


HEENT: Normal conjunctiva, sclera anicteric


CV: Regular rate and rhythm, no edema 


Pulm: Nonlabored respirations on nasal cannula oxygen


ABD: Soft, nontender, nondistended


MSK: No joint tenderness


Integumentary: No rashes


Neuro: Normal speech, normal affect





Vitals reviewed





Assessment:


Acute on chronic hypoxic respiratory failure


COPD with exacerbation-on chronic home O2


new onset diastolic CHFbilateral lower extremity pitting edema


CAD with recent stents x 4


Hypertension


Hyperlipidemia


Mild hyponatremia








Plan:


Acute on chronic hypoxic respiratory failure


COPD with exacerbation-on chronic home O2


new onset diastolic CHFbilateral lower extremity pitting edema


CAD with recent stents x 4


Cardiology and pulmonology consulted


Lower extremity edema improved, started spironolactone


Continue steroids, as needed nebulizer treatments


Effient, aspirin, statin continued


echocardiogram shows normal EF, diastolic dysfunction


Sputum culture added-pending


Continue Abx added by pulm/added levaquin 3/30


Setting up home 02-set up


Still significant dyspnea/expiratory wheezing


1-2 days for DC





Hypertension


Hyperlipidemia


Continue home medications





Mild hyponatremia


Monitor chemistry daily





DVT PPX: Lovenox


Code status: Full


Discharge Plan: Home


Plan to discharge in: Greater than 2 days








Time Spent Managing Pts Care (In Minutes): 35

## 2025-03-31 VITALS — TEMPERATURE: 97.8 F | SYSTOLIC BLOOD PRESSURE: 109 MMHG | DIASTOLIC BLOOD PRESSURE: 57 MMHG

## 2025-03-31 VITALS — OXYGEN SATURATION: 100 %

## 2025-03-31 LAB
ANION GAP SERPL CALC-SCNC: 4.4 MEQ/L (ref 5–15)
BUN BLD-MCNC: 19 MG/DL (ref 7–18)
GLUCOSE SERPLBLD-MCNC: 120 MG/DL (ref 74–106)
HCT VFR BLD CALC: 37.6 % (ref 39.6–49)
HGB BLD-MCNC: 12.6 G/DL (ref 13.6–17.9)
MCH RBC QN AUTO: 31.1 PG (ref 27–35)
MCHC RBC AUTO-ENTMCNC: 33.4 G/DL (ref 32–36)
MCV RBC: 93.1 FL (ref 80–100)
PMV BLD: 7.9 FL (ref 7.6–11.3)
POTASSIUM SERPL-SCNC: 4.4 MEQ/L (ref 3.5–5.1)
RBC # BLD: 4.04 M/UL (ref 4.33–5.43)
WBC # BLD AUTO: 8.9 THOU/UL (ref 4.3–10.9)

## 2025-03-31 NOTE — P.PN
Subjective


Date of Service: 03/31/25


Chief Complaint: COPD exacerbation


Subjective: No new changes, No C/O voiced, Tolerating diet, Ambulating, 

Improving





Review of Systems


10-point ROS is otherwise unremarkable





Physical Examination





- Vital Signs


Temperature: 97.8 F


Blood Pressure: 109/57


Pulse: 86


Respirations: 18


Pulse Ox (%): 100





- Physical Exam


General: Alert, In no apparent distress


HEENT: Atraumatic, PERRLA, EOMI


Neck: Supple, JVD not distended


Respiratory: Clear to auscultation bilaterally, Normal air movement


Cardiovascular: Regular rate/rhythm, Normal S1 S2


Gastrointestinal: Normal bowel sounds, No tenderness


Musculoskeletal: No tenderness


Integumentary: No rashes


Neurological: Normal speech, Normal tone, Normal affect


Lymphatics: No axilla or inguinal lymphadenopathy





- Studies


Medications List Reviewed: Yes





Assessment And Plan





- Current Problems (Diagnosis)


(1) Acute on chronic diastolic heart failure


Current Visit: Yes   Status: Acute   


Plan: 


continue Toprol XL 25 mg daily


continue Valsartan 180 mg daily


continue Aldactone 25 mg daily











(2) CAD (coronary artery disease)


Current Visit: Yes   Status: Acute   


Plan: 


continue ASA and Effient and statin


Echo shows normal LV systolic function, no wall motion abnormalities.

## 2025-03-31 NOTE — P.DS
Admission Date: 03/25/25


Discharge Date: 03/31/25


Disposition: ROUTINE DISCHARGE


Discharge Condition: GOOD


Reason for Admission: COPD exacerbation


Brief History of Present Illness: 





69-year-old male with history of COPD on chronic home O2, hypertension, 

hyperlipidemia, tobacco use disorder, CAD with recent stents presents to the 

emergency department chief complaint of shortness of breath, lower extremity 

edema.  About 1 month ago he had 4 stents placed, he has been compliant with his

medications including Effient, aspirin, statin.  He reports last few days he has

noticed increasing swelling in his lower extremities as well as worsening 

trouble with his breathing.  He also reports increase sputum production and 

wheezing.





Patient was evaluated in the emergency department chest x-ray was performed 

which showed no acute findings labs were significant for sodium 134 bicarb 35 

 troponin 23.1 white blood count 11.7.  Patient has 3+ pitting edema of 

his lower extremities currently he was given IV Lasix, steroids, nebs in the ED 

and was still having some dyspnea, he was started on BiPAP for work of breathing

in ED.  He will be admitted for further evaluation and management of suspected 

new onset CHF with possible COPD exacerbation.


Hospital Course: 








Assessment:


Acute on chronic hypoxic respiratory failure


COPD with exacerbation-on chronic home O2


new onset diastolic CHFbilateral lower extremity pitting edema


CAD with recent stents x 4


Hypertension


Hyperlipidemia


Mild hyponatremia








Patient was admitted to the hospital for COPD exacerbation, he was also noted to

have lower extremity edema which was new onset.  He was treated for his COPD 

with steroids, nebulizer treatments, inhalers and had gradual improvement in his

symptoms.  He was seen by cardiology and echocardiogram was performed.  

Echocardiogram showed normal LVEF but did show diastolic dysfunction. 





 He was diuresed initially with Lasix and then switched over to spironolactone w

[facility] she has been tolerating well and will be discharged with.  Sputum culture 

was obtained which grew Pseudomonas, patient has been started on levofloxacin 

and he will be given a prescription for Levaquin.  Patient also requested 

refills on his prescriptions, especially his medications for his recent stents 

including his Effient, aspirin and atorvastatin.  These medications have been 

sent to his pharmacy [facility] in [place].  Patient is stable for discharge at this 

time, home oxygen has also been set up for him.





Prescription sent for all of your medications





You will need follow-up with your heart doctor in [place] or the local 

cardiologist here





You should also follow-up with Dr. Massey in the clinic for your lungs in 1 to

2 weeks





Vital Signs/Physical Exam: 














Temp Pulse Resp BP Pulse Ox


 


 97.8 F   86   18   109/57 L  100 


 


 03/31/25 10:01  03/31/25 10:01  03/31/25 10:01  03/31/25 10:01  03/31/25 10:01








General: Alert, In no apparent distress, Oriented x3


HEENT: Atraumatic, PERRLA


Neck: Supple, JVD not distended


Respiratory: Normal air movement, Expiratory wheezes


Cardiovascular: Regular rate/rhythm, Normal S1 S2


Gastrointestinal: Normal bowel sounds


Musculoskeletal: No tenderness


Integumentary: No rashes


Neurological: Normal speech, Normal affect


Laboratory Data at Discharge: 














WBC  8.90 thou/uL (4.3-10.9)   03/31/25  05:55    


 


Hgb  12.6 g/dL (13.6-17.9)  L  03/31/25  05:55    


 


Hct  37.6 % (39.6-49.0)  L  03/31/25  05:55    


 


Plt Count  303 thou/uL (152-406)   03/31/25  05:55    


 


Sodium  136 mEq/L (136-145)   03/31/25  05:55    


 


Potassium  4.4 mEq/L (3.5-5.1)   03/31/25  05:55    


 


BUN  19 mg/dL (7-18)  H  03/31/25  05:55    


 


Creatinine  0.85 mg/dL (0.70-1.30)   03/31/25  05:55    


 


Glucose  120 mg/dL ()  H  03/31/25  05:55    


 


Magnesium  2.4 mg/dL (1.6-2.4)   03/30/25  05:36    


 


Total Bilirubin  0.6 mg/dL (0.2-1.0)   03/25/25  13:15    


 


AST  20 U/L (15-37)   03/25/25  13:15    


 


ALT  44 U/L (16-61)   03/25/25  13:15    


 


Alkaline Phosphatase  84 U/L ()   03/25/25  13:15    








Home Medications: 








Albuterol Inhaler [Ventolin Inhaler*] 2 puff IH Q6H PRN #2 inhaler 03/31/25 


Amlodipine [Norvasc*] 5 mg PO DAILY #60 tab 03/31/25 


Aspirin 81 mg PO DAILY #60 tab.chew 03/31/25 


Atorvastatin Calcium [Lipitor] 40 mg PO BEDTIME #60 tab 03/31/25 


Fluticasone Propion/Salmeterol [Advair 250-50 Diskus] 1 each IH BID #1 inh 

03/31/25 


Metoprolol Succinate [Toprol Xl*] 50 mg PO DAILY #60 tab 03/31/25 


Roflumilast [Daliresp*] 500 mcg PO DAILY #30 tab 03/31/25 


Spironolactone [Aldactone*] 25 mg PO DAILY #30 tab 03/31/25 


Valsartan [Diovan] 160 mg PO DAILY #60 tab 03/31/25 


levoFLOXacin [Levaquin*] 750 mg PO DAILY #7 tab 03/31/25 


prasugrel HCL [Prasugrel HCl] 1 tab PO DAILY #60 tab 03/31/25 


predniSONE [Deltasone*] 10 mg PO BID 10 Days #20 tab 03/31/25 





New Medications: 


Fluticasone Propion/Salmeterol [Advair 250-50 Diskus] 1 each IH BID #1 inh


Spironolactone [Aldactone*] 25 mg PO DAILY #30 tab


Aspirin 81 mg PO DAILY #60 tab.chew


Roflumilast [Daliresp*] 500 mcg PO DAILY #30 tab


predniSONE [Deltasone*] 10 mg PO BID 10 Days #20 tab


Valsartan [Diovan] 160 mg PO DAILY #60 tab


levoFLOXacin [Levaquin*] 750 mg PO DAILY #7 tab


Atorvastatin Calcium [Lipitor] 40 mg PO BEDTIME #60 tab


Amlodipine [Norvasc*] 5 mg PO DAILY #60 tab


prasugrel HCL [Prasugrel HCl] 1 tab PO DAILY #60 tab


Metoprolol Succinate [Toprol Xl*] 50 mg PO DAILY #60 tab


Albuterol Inhaler [Ventolin Inhaler*] 2 puff IH Q6H PRN #2 inhaler


 PRN Reason: Shortness Of Breath


Physician Discharge Instructions: 


Patient was admitted to the hospital for COPD exacerbation, he was also noted to

have lower extremity edema which was new onset.  He was treated for his COPD 

with steroids, nebulizer treatments, inhalers and had gradual improvement in his

symptoms.  He was seen by cardiology and echocardiogram was performed.  

Echocardiogram showed normal LVEF but did show diastolic dysfunction. 





 He was diuresed initially with Lasix and then switched over to spironolactone 

which she has been tolerating well and will be discharged with.  Sputum culture 

was obtained which grew Pseudomonas, patient has been started on levofloxacin 

and he will be given a prescription for Levaquin.  Patient also requested refill

s on his prescriptions, especially his medications for his recent stents 

including his Effient, aspirin and atorvastatin.  These medications have been 

sent to his pharmacy [facility] in [place].  Patient is stable for discharge at this 

time, home oxygen has also been set up for him.





Prescription sent for all of your medications





You will need follow-up with your heart doctor in [place] or the local 

ardiologist here





You should also follow-up with Dr. Massey in the clinic for your lungs in 1 to

2 weeks


Diet: Low sodium


Activity: Ad gallito


Followup: 


John Massey MD [ACTIVE - CAN ADMIT] - 1-2 Weeks


Luis Ulloa MD [ACTIVE - CAN ADMIT] - 1-2 Weeks


NONE,NONE [Primary Care Provider] - 1-2 Weeks


Time spent managing pt's care (in minutes): 50

## 2025-04-10 ENCOUNTER — HOSPITAL ENCOUNTER (INPATIENT)
Dept: HOSPITAL 97 - ER | Age: 70
LOS: 3 days | Discharge: HOME | DRG: 189 | End: 2025-04-13
Attending: INTERNAL MEDICINE | Admitting: INTERNAL MEDICINE
Payer: COMMERCIAL

## 2025-04-10 VITALS — BODY MASS INDEX: 26.6 KG/M2

## 2025-04-10 DIAGNOSIS — Z79.52: ICD-10-CM

## 2025-04-10 DIAGNOSIS — Z79.899: ICD-10-CM

## 2025-04-10 DIAGNOSIS — Z23: ICD-10-CM

## 2025-04-10 DIAGNOSIS — I10: ICD-10-CM

## 2025-04-10 DIAGNOSIS — Z99.81: ICD-10-CM

## 2025-04-10 DIAGNOSIS — J44.1: ICD-10-CM

## 2025-04-10 DIAGNOSIS — Z79.82: ICD-10-CM

## 2025-04-10 DIAGNOSIS — J96.21: Primary | ICD-10-CM

## 2025-04-10 DIAGNOSIS — F41.9: ICD-10-CM

## 2025-04-10 DIAGNOSIS — G47.00: ICD-10-CM

## 2025-04-10 DIAGNOSIS — D72.829: ICD-10-CM

## 2025-04-10 DIAGNOSIS — F17.210: ICD-10-CM

## 2025-04-10 DIAGNOSIS — I25.2: ICD-10-CM

## 2025-04-10 DIAGNOSIS — J96.22: ICD-10-CM

## 2025-04-10 DIAGNOSIS — B96.5: ICD-10-CM

## 2025-04-10 DIAGNOSIS — Z11.52: ICD-10-CM

## 2025-04-10 DIAGNOSIS — Z71.6: ICD-10-CM

## 2025-04-10 LAB
ALBUMIN SERPL BCP-MCNC: 2.7 G/DL (ref 3.4–5)
ALBUMIN/GLOB SERPL: 0.8 {RATIO} (ref 1.1–1.8)
ANION GAP SERPL CALC-SCNC: 9.9 MEQ/L (ref 5–15)
BILIRUB INDIRECT SERPL-MCNC: 0.2 MG/DL (ref 0.2–0.8)
COHGB MFR BLDA: 1.1 % (ref 0–1.5)
GLOBULIN SER CALC-MCNC: 3.5 G/DL (ref 2.3–3.5)
HCT VFR BLD CALC: 36.5 % (ref 39.6–49)
HGB BLD-MCNC: 12.2 G/DL (ref 13.6–17.9)
HGB BLDA-MCNC: 11.8 G/DL (ref 12–18)
INR BLD: 1.05
LYMPHOCYTES # SPEC AUTO: 2.1 K/UL (ref 0.7–4.9)
MAGNESIUM SERPL-MCNC: 4.7 MG/DL (ref 1.6–2.4)
MCH RBC QN AUTO: 30.1 PG (ref 27–35)
MCHC RBC AUTO-ENTMCNC: 33.5 G/DL (ref 32–36)
MCV RBC: 89.8 FL (ref 80–100)
MORPHOLOGY BLD-IMP: (no result)
NEUTROPHILS NFR FLD MANUAL: 79 % (ref 40–80)
OXYHGB MFR BLDA: 94.3 % (ref 94–97)
PMV BLD: 7.6 FL (ref 7.6–11.3)
POTASSIUM SERPL-SCNC: 3.9 MEQ/L (ref 3.5–5.1)
PROTHROMBIN TIME: 11.9 SECONDS (ref 10–13)
RBC # BLD: 4.06 M/UL (ref 4.33–5.43)
SAO2 % BLDA: 97.4 % (ref 92–98.5)
TOTAL CELLS COUNTED SPEC: 100

## 2025-04-10 PROCEDURE — 83735 ASSAY OF MAGNESIUM: CPT

## 2025-04-10 PROCEDURE — 80053 COMPREHEN METABOLIC PANEL: CPT

## 2025-04-10 PROCEDURE — 96375 TX/PRO/DX INJ NEW DRUG ADDON: CPT

## 2025-04-10 PROCEDURE — 81001 URINALYSIS AUTO W/SCOPE: CPT

## 2025-04-10 PROCEDURE — 36415 COLL VENOUS BLD VENIPUNCTURE: CPT

## 2025-04-10 PROCEDURE — 71045 X-RAY EXAM CHEST 1 VIEW: CPT

## 2025-04-10 PROCEDURE — 96368 THER/DIAG CONCURRENT INF: CPT

## 2025-04-10 PROCEDURE — 94640 AIRWAY INHALATION TREATMENT: CPT

## 2025-04-10 PROCEDURE — 87040 BLOOD CULTURE FOR BACTERIA: CPT

## 2025-04-10 PROCEDURE — 87428 SARSCOV & INF VIR A&B AG IA: CPT

## 2025-04-10 PROCEDURE — 84145 PROCALCITONIN (PCT): CPT

## 2025-04-10 PROCEDURE — 82805 BLOOD GASES W/O2 SATURATION: CPT

## 2025-04-10 PROCEDURE — 97161 PT EVAL LOW COMPLEX 20 MIN: CPT

## 2025-04-10 PROCEDURE — 5A09457 ASSISTANCE WITH RESPIRATORY VENTILATION, 24-96 CONSECUTIVE HOURS, CONTINUOUS POSITIVE AIRWAY PRESSURE: ICD-10-PCS

## 2025-04-10 PROCEDURE — 80076 HEPATIC FUNCTION PANEL: CPT

## 2025-04-10 PROCEDURE — 94660 CPAP INITIATION&MGMT: CPT

## 2025-04-10 PROCEDURE — 85025 COMPLETE CBC W/AUTO DIFF WBC: CPT

## 2025-04-10 PROCEDURE — 84484 ASSAY OF TROPONIN QUANT: CPT

## 2025-04-10 PROCEDURE — 93005 ELECTROCARDIOGRAM TRACING: CPT

## 2025-04-10 PROCEDURE — 99285 EMERGENCY DEPT VISIT HI MDM: CPT

## 2025-04-10 PROCEDURE — 96365 THER/PROPH/DIAG IV INF INIT: CPT

## 2025-04-10 PROCEDURE — 86140 C-REACTIVE PROTEIN: CPT

## 2025-04-10 PROCEDURE — 83880 ASSAY OF NATRIURETIC PEPTIDE: CPT

## 2025-04-10 PROCEDURE — 96367 TX/PROPH/DG ADDL SEQ IV INF: CPT

## 2025-04-10 PROCEDURE — 97530 THERAPEUTIC ACTIVITIES: CPT

## 2025-04-10 PROCEDURE — 83605 ASSAY OF LACTIC ACID: CPT

## 2025-04-10 PROCEDURE — 97116 GAIT TRAINING THERAPY: CPT

## 2025-04-10 PROCEDURE — 85610 PROTHROMBIN TIME: CPT

## 2025-04-10 PROCEDURE — 80048 BASIC METABOLIC PNL TOTAL CA: CPT

## 2025-04-10 RX ADMIN — ATORVASTATIN CALCIUM SCH MG: 40 TABLET, FILM COATED ORAL at 20:59

## 2025-04-10 RX ADMIN — ALBUTEROL SULFATE SCH MG: 2.5 SOLUTION RESPIRATORY (INHALATION) at 12:30

## 2025-04-10 RX ADMIN — METHYLPREDNISOLONE SODIUM SUCCINATE SCH MG: 40 INJECTION, POWDER, FOR SOLUTION INTRAMUSCULAR; INTRAVENOUS at 17:45

## 2025-04-10 RX ADMIN — ENOXAPARIN SODIUM SCH MG: 40 INJECTION SUBCUTANEOUS at 17:45

## 2025-04-10 RX ADMIN — DOXYCYCLINE SCH MG: 100 CAPSULE ORAL at 20:59

## 2025-04-10 RX ADMIN — BUDESONIDE SCH MG: 0.25 INHALANT ORAL at 20:45

## 2025-04-10 RX ADMIN — METHYLPREDNISOLONE SODIUM SUCCINATE SCH MLS: 1 INJECTION, POWDER, FOR SOLUTION INTRAMUSCULAR; INTRAVENOUS at 11:00

## 2025-04-10 RX ADMIN — IPRATROPIUM BROMIDE SCH MG: 0.5 SOLUTION RESPIRATORY (INHALATION) at 12:30

## 2025-04-11 LAB
ALBUMIN SERPL BCP-MCNC: 2.9 G/DL (ref 3.4–5)
ALBUMIN/GLOB SERPL: 0.7 {RATIO} (ref 1.1–1.8)
ANION GAP SERPL CALC-SCNC: 9.6 MEQ/L (ref 5–15)
BLD SMEAR INTERP: (no result)
HCT VFR BLD CALC: 36.4 % (ref 39.6–49)
HGB BLD-MCNC: 12.3 G/DL (ref 13.6–17.9)
LYMPHOCYTES # SPEC AUTO: 0.9 K/UL (ref 0.7–4.9)
MAGNESIUM SERPL-MCNC: 2.4 MG/DL (ref 1.6–2.4)
MCH RBC QN AUTO: 30.5 PG (ref 27–35)
MCHC RBC AUTO-ENTMCNC: 33.7 G/DL (ref 32–36)
MCV RBC: 90.6 FL (ref 80–100)
MORPHOLOGY BLD-IMP: (no result)
NRBC BLD AUTO-RTO: 0.1 % (ref 0–0)
PMV BLD: 7.6 FL (ref 7.6–11.3)
POTASSIUM SERPL-SCNC: 4.6 MEQ/L (ref 3.5–5.1)
RBC # BLD: 4.01 M/UL (ref 4.33–5.43)
SQUAMOUS URNS QL MICRO: <5 /HPF
UA COMPLETE W REFLEX CULTURE PNL UR: (no result)
UA DIPSTICK W REFLEX MICRO PNL UR: (no result)

## 2025-04-11 RX ADMIN — LEVOFLOXACIN SCH MLS: 5 INJECTION, SOLUTION INTRAVENOUS at 16:36

## 2025-04-11 RX ADMIN — ASPIRIN 81 MG SCH MG: 81 TABLET ORAL at 09:07

## 2025-04-11 RX ADMIN — PRASUGREL SCH MG: 10 TABLET, FILM COATED ORAL at 09:08

## 2025-04-12 LAB
ANION GAP SERPL CALC-SCNC: 7.5 MEQ/L (ref 5–15)
HCT VFR BLD CALC: 32.9 % (ref 39.6–49)
HGB BLD-MCNC: 11.2 G/DL (ref 13.6–17.9)
LYMPHOCYTES # SPEC AUTO: 0.7 K/UL (ref 0.7–4.9)
MAGNESIUM SERPL-MCNC: 2.3 MG/DL (ref 1.6–2.4)
MCH RBC QN AUTO: 31.1 PG (ref 27–35)
MCHC RBC AUTO-ENTMCNC: 34.1 G/DL (ref 32–36)
MCV RBC: 91.3 FL (ref 80–100)
NRBC BLD AUTO-RTO: 0.1 % (ref 0–0)
PMV BLD: 7.7 FL (ref 7.6–11.3)
POTASSIUM SERPL-SCNC: 4.5 MEQ/L (ref 3.5–5.1)

## 2025-04-12 RX ADMIN — BUSPIRONE HYDROCHLORIDE SCH MG: 5 TABLET ORAL at 12:12

## 2025-04-12 RX ADMIN — ACETAMINOPHEN PRN MG: 500 TABLET, FILM COATED ORAL at 09:42

## 2025-04-12 RX ADMIN — VALSARTAN SCH: 160 TABLET ORAL at 09:00

## 2025-04-12 RX ADMIN — TRAZODONE HYDROCHLORIDE SCH MG: 50 TABLET ORAL at 20:58

## 2025-04-12 RX ADMIN — MIRTAZAPINE SCH MG: 15 TABLET, FILM COATED ORAL at 20:59

## 2025-04-12 RX ADMIN — METOPROLOL SUCCINATE SCH: 50 TABLET, EXTENDED RELEASE ORAL at 09:00

## 2025-04-12 RX ADMIN — SPIRONOLACTONE SCH: 25 TABLET, FILM COATED ORAL at 09:00

## 2025-04-12 RX ADMIN — AMLODIPINE BESYLATE SCH: 5 TABLET ORAL at 09:00

## 2025-04-13 VITALS — SYSTOLIC BLOOD PRESSURE: 133 MMHG | TEMPERATURE: 98.2 F | DIASTOLIC BLOOD PRESSURE: 77 MMHG

## 2025-04-13 VITALS — OXYGEN SATURATION: 98 %

## 2025-04-13 LAB
ANION GAP SERPL CALC-SCNC: 6.5 MEQ/L (ref 5–15)
HCT VFR BLD CALC: 29.6 % (ref 39.6–49)
HGB BLD-MCNC: 10.2 G/DL (ref 13.6–17.9)
LYMPHOCYTES # SPEC AUTO: 0.6 K/UL (ref 0.7–4.9)
MAGNESIUM SERPL-MCNC: 2.2 MG/DL (ref 1.6–2.4)
MCH RBC QN AUTO: 31.5 PG (ref 27–35)
MCHC RBC AUTO-ENTMCNC: 34.3 G/DL (ref 32–36)
MCV RBC: 91.8 FL (ref 80–100)
NRBC BLD AUTO-RTO: 0.1 % (ref 0–0)
PMV BLD: 7.3 FL (ref 7.6–11.3)
POTASSIUM SERPL-SCNC: 4.5 MEQ/L (ref 3.5–5.1)
RBC # BLD: 3.23 M/UL (ref 4.33–5.43)